# Patient Record
Sex: FEMALE | Race: WHITE
[De-identification: names, ages, dates, MRNs, and addresses within clinical notes are randomized per-mention and may not be internally consistent; named-entity substitution may affect disease eponyms.]

---

## 2017-04-08 NOTE — PHYS DOC
Past Medical History


Past Medical History:  Arthritis, Migraines, Other


Additional Past Medical Histor:  RA


Past Surgical History:  Cholecystectomy


Alcohol Use:  Rarely


Drug Use:  None





Adult General


Chief Complaint


Chief Complaint:  HEADACHE





HPI


HPI


Patient is a 37  year old female who presents with migraine headache.  The 

patient reports history of poorly controlled migraines.  Most recent onset of 

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  She reports 

headache is generalized, aching/throbbing, similar to previous migraine.  She 

reports photophobia/phonophobia, visual changes/aura, nausea  She denies fevers

, neck stiffness, vomiting, extremity numbness/weakness.  She did not take home 

meds today because she was already at her workplace . Has seen numerous 

specialists but never achieved good control of her symptoms, reports most days 

of each month she suffers from a migraine.





Review of Systems


Review of Systems


Constitutional: Denies fever or chills 


Eyes: Reports visual changes


HENT: Denies nasal congestion or sore throat 


Respiratory: Denies cough or shortness of breath 


Cardiovascular:  Denies chest pain or edema


GI: Reports nausea.  Denies abdominal pain, vomiting


Musculoskeletal: Denies back pain or joint pain 


Integument: Denies rash or skin lesions 


Neurologic: Reports headache, denies focal weakness or sensory changes





Current Medications


Current Medications





 Current Medications








 Medications


  (Trade)  Dose


 Ordered  Sig/Lj  Start Time


 Stop Time Status Last Admin


Dose Admin


 


 Diphenhydramine


 HCl


  (Benadryl)  25 mg  1X  ONCE  4/8/17 16:15


 4/8/17 16:16 DC 4/8/17 16:18


25 MG


 


 Hydromorphone HCl


  (Dilaudid)  1 mg  PRN Q15MIN  PRN  4/8/17 17:30


 4/8/17 18:50 DC 4/8/17 18:03


1 MG


 


 Ketorolac


 Tromethamine


  (Toradol)  30 mg  1X  ONCE  4/8/17 16:15


 4/8/17 16:16 DC 4/8/17 16:19


30 MG


 


 Methylprednisolone


 Sodium Succinate


  (Solu-Medrol


 125mg Vial)  125 mg  1X  ONCE  4/8/17 16:15


 4/8/17 16:16 DC 4/8/17 16:29


125 MG


 


 Prochlorperazine


 Edisylate


  (Compazine)  10 mg  1X  ONCE  4/8/17 16:15


 4/8/17 16:16 DC 4/8/17 16:18


10 MG


 


 Sodium Chloride


  (Iv Sodium


 Chloride 0.9%


 1000ml Bag)  1,000 ml @ 


 1,000 mls/hr  1X  ONCE  4/8/17 16:15


 4/8/17 17:14 DC 4/8/17 16:18


1,000 MLS/HR











Allergies


Allergies





 Allergies








Coded Allergies Type Severity Reaction Last Updated Verified


 


  benztropine Allergy Intermediate SEVERE PAIN 7/10/16 Yes


 


  dexamethasone Allergy Intermediate SEVERE PAIN 7/10/16 Yes











Physical Exam


Physical Exam


Constitutional: Well developed, well nourished, lying supine in dark room with 

hands covering her face. 


HENT: Normocephalic, atraumatic, bilateral external ears normal, oropharynx 

moist, no tonsillar enlargement or exudate ,nose normal.


Eyes: PERRLA, EOMI, conjunctiva normal, no discharge.


Neck: supple, no stridor.  no meningismus


Cardiovascular:  RRR, no murmurs, no edema. 


Lungs & Thorax:  LCTAB, no wheezing, no respiratory distress.


Abdomen: soft, nontender, nondistended.


Skin: Warm, dry, no erythema, no rash.


Back: No tenderness.


Extremities: No tenderness, no edema. 


Neurologic: Alert and oriented X 3, CN2-12 grossly intact, symmetric strength/

sensation to UE & LE, no focal deficits noted. 


Psychologic: Affect normal, judgement normal, mood normal.





Current Patient Data


Vital Signs





 Vital Signs








  Date Time  Temp Pulse Resp B/P Pulse Ox O2 Delivery O2 Flow Rate FiO2


 


4/8/17 18:19  76 16 153/75 95 Room Air  


 


4/8/17 15:31 97.5       





 97.5       








Lab Values





 Laboratory Tests








Test


  4/8/17


16:56


 


Urine Collection Type Unknown  


 


Urine Color Yellow  


 


Urine Clarity Clear  


 


Urine pH 8.0  


 


Urine Specific Gravity <=1.005  


 


Urine Protein


  Negativemg/dL


(NEG-TRACE)


 


Urine Glucose (UA)


  Negativemg/dL


(NEG)


 


Urine Ketones (Stick)


  Negativemg/dL


(NEG)


 


Urine Blood


  Negative (NEG)


 


 


Urine Nitrite


  Negative (NEG)


 


 


Urine Bilirubin


  Negative (NEG)


 


 


Urine Urobilinogen Dipstick


  0.2mg/dL (0.2


mg/dL)


 


Urine Leukocyte Esterase


  Negative (NEG)


 


 


Urine RBC 0/HPF (0-2)  


 


Urine WBC Occ/HPF (0-4)  


 


Urine Squamous Epithelial


Cells Few/LPF  


 


 


Urine Bacteria 0/HPF (0-FEW)  


 


Urine Pregnancy Test


  Negative (NEG)


 











EKG


EKG


[]





Radiology/Procedures


Radiology/Procedures


[]





Course & Med Decision Making


Course & Med Decision Making


Pertinent Labs and Imaging studies reviewed. (See chart for details)


Patient presents with usual migraine headache.  Gave IV fluids, toradol, 

compazine, benadryl.  She requested solumedrol as steroids have enhanced 

symptom relief in the past.  No significant relief with initial treatment, also 

gave dilaudid.  Her pain improved & she was comfortable with discharge home.  

Recommended rest, PO hydration, continue home meds, follow up with PCP or 

neurologist in 2-3 days if symptoms continue.  Come back for focal neuro deficit

, sudden onset or severe headache, mental status changes, any otherwise 

worsening condition.  Discharged home in stable & improved condition.


[]





Dragon Disclaimer


Dragon Disclaimer


This electronic medical record was generated, in whole or in part, using a 

voice recognition dictation system.





Departure


Departure


Impression:  


 Primary Impression:  


 Migraine


Disposition:  01 HOME, SELF-CARE


Condition:  STABLE


Referrals:  


BENNIE PASCUAL MD (PCP)





Additional Instructions:


You were seen in the emergency department today for migraine headache. Symptoms 

improved with treatment here. Please continue to rest, use home medications as 

prescribed. Follow-up with primary care physician or neurology within 2-3 days 

if not improving. Return to the emergency department for worst headache of your 

life, sudden onset of severe headache, severe confusion, difficulty walking or 

talking, trouble moving arms or legs, any otherwise worsening condition.








SONNY FUNG MD Apr 8, 2017 18:07

## 2017-09-02 NOTE — PHYS DOC
Past Medical History


Past Medical History:  Arthritis, Migraines, Other


Additional Past Medical Histor:  RA


Past Surgical History:  Cholecystectomy


Alcohol Use:  Rarely


Drug Use:  None





Adult General


Chief Complaint


Chief Complaint:  HEADACHE





HPI


HPI





Patient is a 37  year old female who presents with migraine headache.  The 

patient reports headache since this morning which is frontal, throbbing, 

typical of usual migraine headache.  Not sudden in onset, not the worst 

headache of her life.  Associated with nausea, photophobia, phonophobia.  

Denies fevers/chills, neck stiffness, vomiting, extremity numbness/weakness.  

She used DHE without relief.  Recently got an ear piercing that has provided 

some relief.  She has a PCP.





Review of Systems


Review of Systems


Constitutional: Denies fever or chills 


Eyes: Denies change in visual acuity


HENT: Denies nasal congestion or sore throat 


Respiratory: Denies cough or shortness of breath 


Cardiovascular:  Denies chest pain 


GI: Reports nausea.  Denies abdominal pain, vomiting


: Denies dysuria or hematuria 


Musculoskeletal: Denies back pain or joint pain 


Integument: Denies rash or skin lesions 


Neurologic: Reports headache, denies focal weakness or sensory changes





Current Medications


Current Medications





Current Medications








 Medications


  (Trade)  Dose


 Ordered  Sig/Lj  Start Time


 Stop Time Status Last Admin


Dose Admin


 


 Diphenhydramine


 HCl


  (Benadryl)  25 mg  1X  ONCE  9/2/17 11:45


 9/2/17 11:47 DC 9/2/17 12:25


25 MG


 


 Fentanyl Citrate


  (Fentanyl 2ml


 Vial)  50 mcg  1X  ONCE  9/2/17 14:00


 9/2/17 14:01 DC  


 


 


 Ketorolac


 Tromethamine


  (Toradol)  30 mg  1X  ONCE  9/2/17 11:45


 9/2/17 11:47 DC 9/2/17 12:25


30 MG


 


 Ondansetron HCl


  (Zofran)  4 mg  1X  ONCE  9/2/17 14:00


 9/2/17 14:01 DC  


 


 


 Prochlorperazine


 Edisylate


  (Compazine)  10 mg  1X  ONCE  9/2/17 11:45


 9/2/17 11:47 DC 9/2/17 12:25


10 MG











Allergies


Allergies





Allergies








Coded Allergies Type Severity Reaction Last Updated Verified


 


  benztropine Allergy Intermediate SEVERE PAIN 7/10/16 Yes


 


  dexamethasone Allergy Intermediate SEVERE PAIN 7/10/16 Yes











Physical Exam


Physical Exam


Constitutional: Well developed, well nourished, no acute distress, non-toxic 

appearance. 


HENT: Normocephalic, atraumatic, bilateral external ears normal, oropharynx 

moist, nose normal.


Eyes: PERRLA, EOMI, conjunctiva normal, no discharge.


Neck: supple, no stridor.  no meningismus.


Cardiovascular:  RRR, no murmurs, no edema. 


Lungs & Thorax:  LCTAB, no wheezing, no respiratory distress.


Abdomen: soft, nontender, nondistended.


Skin: Warm, dry, no erythema, no rash.


Back: No tenderness.


Extremities: No tenderness, no edema. 


Neurologic: Alert and oriented X 3, CN2-12 grossly intact, symmetric strength/

sensation to upper & lower extremities.  no focal deficits noted. 


Psychologic: Affect normal, judgement normal, mood normal.





Current Patient Data


Vital Signs





 Vital Signs








  Date Time  Temp Pulse Resp B/P (MAP) Pulse Ox O2 Delivery O2 Flow Rate FiO2


 


9/2/17 14:24  88 18 141/68 (92) 94 Room Air  


 


9/2/17 11:20 98.0       





 98.0       











EKG


EKG


[]





Radiology/Procedures


Radiology/Procedures


[]





Course & Med Decision Making


Course & Med Decision Making


Pertinent Labs and Imaging studies reviewed. (See chart for details)


The patient presents with usual migraine headache.  Gave compazine, benadryl, 

toradol, fluids.  Her symptoms improved.  Ordered UA & UCG but she did not 

provide urine.  She felt better & requested discharge home.  Recommend rest, PO 

hydration, follow up with PCP in 2-3 days for ongoing migraine management.  

Come back for sudden onset severe headache, worst headache of her life, focal 

neuro deficit, any otherwise worsening condition.  Discharged home in stable & 

improved condition.


[]





Dragon Disclaimer


Dragon Disclaimer


This electronic medical record was generated, in whole or in part, using a 

voice recognition dictation system.





Departure


Departure


Impression:  


 Primary Impression:  


 Migraine headache


Disposition:  01 HOME, SELF-CARE


Condition:  IMPROVED


Referrals:  


BENNIE PASCUAL MD (PCP)


Patient Instructions:  Migraine Headache, Easy-to-Read





Additional Instructions:  


You were seen in the emergency department today for migraine headache. Symptoms 

improved after treatment here. Please follow-up with primary care physician for 

ongoing management. Return to the emergency department for worst headache of 

your life, sudden onset severe headache, uncontrolled vomiting, difficulty 

moving arms or legs, any otherwise worsening condition.











SONNY FUNG MD Sep 2, 2017 14:27

## 2017-09-13 NOTE — RAD
CT scan of the head without contrast 9/13/2017

 

Clinical History: Severe intractable headaches.

 

Technique: Unenhanced, contiguous, 5 mm axial sections were obtained 

through the head.

 

One or more of the following individualized dose reduction techniques were

utilized for this study:

 

1. Automated exposure control.

2. Adjustment of the mA and/or kV according to patient size.

3. Use of iterative reconstruction technique.

 

 

 

Findings: The ventricles and sulci are within normal limits in size and 

configuration. No focal area of abnormal attenuation is seen involving the

brain parenchyma. No extra-axial fluid collection is seen. No skull 

fracture is seen.

 

Impression: Negative study.

 

Electronically signed by: Austin Van MD (9/13/2017 9:32 PM) [id number]

## 2017-09-13 NOTE — PHYS DOC
Past Medical History


Past Medical History:  Arthritis, Migraines, Other


Additional Past Medical Histor:  RA


Past Surgical History:  Cholecystectomy


Alcohol Use:  Rarely


Drug Use:  None





Adult General


Chief Complaint


Chief Complaint:  HEADACHE





HPI


HPI





Patient is a 37  year old  female who presents with migraine 

headache.She states it is a cluster headaches been going on for approximately 2 

weeks total. She states occasionally over the last 2 weeks over-the-counter 

medicines controlled her headaches, other times she has to get a prescription 

strength medicine. She states is a throbbing sensation she feels nauseated with 

it, it's on the right side of her head behind her right eye in the right side 

of her jaw. She states she's tried DHE for yesterday and the day before in 

addition she tried Toradol yesterday.. She states that she's had a migraine 

history for years and occasional she needs Solu-Medrol and magnesium. She 

states she was just here a few weeks ago with similar situation. She states she'

s tried Toradol at home eyes any fevers, neck stiffness. She does state sound 

and light makes her headache feel worse.





Review of Systems


Review of Systems





Constitutional: Denies fever or chills []


Eyes: Denies change in visual acuity, redness, or eye pain []


HENT: Denies nasal congestion or sore throat []


Respiratory: Denies cough or shortness of breath []


Cardiovascular: No additional information not addressed in HPI []


GI: Denies abdominal pain, nausea, vomiting, bloody stools or diarrhea []


: Denies dysuria or hematuria []


Musculoskeletal: Denies back pain or joint pain []


Integument: Denies rash or skin lesions []


Neurologic: Positive for headache,  Denies focal weakness or sensory changes []


Endocrine: Denies polyuria or polydipsia []





Current Medications


Current Medications





Current Medications








 Medications


  (Trade)  Dose


 Ordered  Sig/Lj  Start Time


 Stop Time Status Last Admin


Dose Admin


 


 Diphenhydramine


 HCl


  (Benadryl)  25 mg  1X  ONCE  9/13/17 20:00


 9/13/17 20:01 DC 9/13/17 20:02


25 MG


 


 Hydromorphone HCl


  (Dilaudid)  1 mg  PRN Q15MIN  PRN  9/13/17 18:45


 9/14/17 18:44  9/13/17 20:03


1 MG


 


 Ketorolac


 Tromethamine


  (Toradol)  30 mg  1X  ONCE  9/13/17 18:45


 9/13/17 18:47 DC 9/13/17 18:56


30 MG


 


 Lidocaine HCl


  (Xylocaine-Mpf


 1% Vial)  5 ml  1X  ONCE  9/13/17 19:45


 9/13/17 19:46 DC 9/13/17 19:53


5 ML


 


 Magnesium Sulfate/


 Dextrose  100 ml @ 


 100 mls/hr  1X  ONCE  9/13/17 18:45


 9/13/17 19:44 DC 9/13/17 18:57


100 MLS/HR


 


 Methylprednisolone


 Sodium Succinate


  (SOLU-Medrol


 125MG VIAL)  125 mg  1X  ONCE  9/13/17 17:30


 9/13/17 17:31 DC 9/13/17 17:45


125 MG


 


 Promethazine HCl


 12.5 mg/Sodium


 Chloride  50.5 ml @ 


 151.5 mls/


 hr  PRN Q6HRS  PRN  9/13/17 17:30


    9/13/17 17:50


151.5 MLS/HR


 


 Sodium Chloride  1,000 ml @ 


 1,000 mls/hr  1X  ONCE  9/13/17 17:30


 9/13/17 18:29 DC 9/13/17 17:49


1,000 MLS/HR











Allergies


Allergies





Allergies








Coded Allergies Type Severity Reaction Last Updated Verified


 


  benztropine Allergy Intermediate SEVERE PAIN 7/10/16 Yes


 


  dexamethasone Allergy Intermediate SEVERE PAIN 7/10/16 Yes











Physical Exam


Physical Exam





Constitutional: Well developed, well nourished, no acute distress, non-toxic 

appearance. []


HENT: Normocephalic, atraumatic, bilateral external ears normal, oropharynx 

moist, no oral exudates, nose normal. []


Eyes: PERRLA, EOMI, conjunctiva normal, no discharge. [] 


Neck: Normal range of motion, no tenderness, supple, no stridor. [] 


Cardiovascular:Heart rate regular rhythm, no murmur []


Lungs & Thorax:  Bilateral breath sounds clear to auscultation []


Abdomen: Bowel sounds normal, soft, no tenderness, no masses, no pulsatile 

masses. [] 


Skin: Warm, dry, no erythema, no rash. [] 


Back: No tenderness, no CVA tenderness. [] 


Extremities: No tenderness, no cyanosis, no clubbing, ROM intact, no edema. [] 


Neurologic: Alert and oriented X 3, normal motor function, normal sensory 

function, no focal deficits noted. []


Psychologic: Affect normal, judgement normal, mood normal. []





Current Patient Data


Vital Signs





 Vital Signs








  Date Time  Temp Pulse Resp B/P (MAP) Pulse Ox O2 Delivery O2 Flow Rate FiO2


 


9/13/17 20:03   18  99 Nasal Cannula  


 


9/13/17 18:37  90  164/79 (107)    


 


9/13/17 17:00 98.0       





 98.0       








Lab Values





 Laboratory Tests








Test


  9/13/17


17:30 9/13/17


18:20 9/13/17


18:27


 


White Blood Count


  6.7 x10^3/uL


(4.0-11.0) 


  


 


 


Red Blood Count


  4.51 x10^6/uL


(3.50-5.40) 


  


 


 


Hemoglobin


  13.3 g/dL


(12.0-15.5) 


  


 


 


Hematocrit


  38.8 %


(36.0-47.0) 


  


 


 


Mean Corpuscular Volume


  86 fL ()


  


  


 


 


Mean Corpuscular Hemoglobin 29 pg (25-35)    


 


Mean Corpuscular Hemoglobin


Concent 34 g/dL


(31-37) 


  


 


 


Red Cell Distribution Width


  12.9 %


(11.5-14.5) 


  


 


 


Platelet Count


  243 x10^3/uL


(140-400) 


  


 


 


Neutrophils (%) (Auto) 56 % (31-73)    


 


Lymphocytes (%) (Auto) 33 % (24-48)    


 


Monocytes (%) (Auto) 8 % (0-9)    


 


Eosinophils (%) (Auto) 3 % (0-3)    


 


Basophils (%) (Auto) 1 % (0-3)    


 


Neutrophils # (Auto)


  3.7 x10^3uL


(1.8-7.7) 


  


 


 


Lymphocytes # (Auto)


  2.2 x10^3/uL


(1.0-4.8) 


  


 


 


Monocytes # (Auto)


  0.5 x10^3/uL


(0.0-1.1) 


  


 


 


Eosinophils # (Auto)


  0.2 x10^3/uL


(0.0-0.7) 


  


 


 


Basophils # (Auto)


  0.0 x10^3/uL


(0.0-0.2) 


  


 


 


Urine Collection Type  Unknown   


 


Urine Color  Yellow   


 


Urine Clarity  Clear   


 


Urine pH  7.5   


 


Urine Specific Gravity  1.010   


 


Urine Protein


  


  Negative mg/dL


(NEG-TRACE) 


 


 


Urine Glucose (UA)


  


  Negative mg/dL


(NEG) 


 


 


Urine Ketones (Stick)


  


  15 mg/dL (NEG)


  


 


 


Urine Blood


  


  Negative (NEG)


  


 


 


Urine Nitrite


  


  Negative (NEG)


  


 


 


Urine Bilirubin


  


  Negative (NEG)


  


 


 


Urine Urobilinogen Dipstick


  


  0.2 mg/dL (0.2


mg/dL) 


 


 


Urine Leukocyte Esterase  Small (NEG)   


 


Urine RBC  0 /HPF (0-2)   


 


Urine WBC


  


  1-4 /HPF (0-4)


  


 


 


Urine Squamous Epithelial


Cells 


  Mod /LPF  


  


 


 


Urine Bacteria


  


  Moderate /HPF


(0-FEW) 


 


 


POC Urine HCG, Qualitative


  


  


  Hcg negative


(Negative)





 Laboratory Tests


9/13/17 17:30











EKG


EKG


[]





Radiology/Procedures


Radiology/Procedures


[]


Impressions:


Migraine headache





Course & Med Decision Making


Course & Med Decision Making


Pertinent Labs and Imaging studies reviewed. (See chart for details)





1844 patient states the headache is no better with her treatment of Phenergan, 

Benadryl, Solu-Medrol. She states symptoms magnesium works. She did try Toradol 

but was earlier in the week. So we will start 1 g mag, 30  mg IV Toradol and 1 

mg IV Dilaudid.





We attempted 2% lidocaine and use a nebulizer and she states the pain went from 

a 10 down to an 8 but she still doesn't feel well enough to be discharged. We 

will admit and consult neurology. I also tried nonrebreather 15 L/m for 20 

minutes to see if this would help. I spoke with Dr. Almendarez wanted CT noncontrast 

of the brain performed now and MRI with and without contrast in the morning. 

These images have been ordered and are pending at this time. Patient's being 

admitted to the hospitalist who is going to follow-up on the CT scan.





Dragon Disclaimer


Dragon Disclaimer


This electronic medical record was generated, in whole or in part, using a 

voice recognition dictation system.





Departure


Departure


Impression:  


 Primary Impression:  


 Migraine


Disposition:  09 ADMITTED AS INPATIENT


Admitting Physician:  Other


Condition:  STABLE


Referrals:  


BENNIE PASCUAL MD (PCP)





Problem Qualifiers








 Primary Impression:  


 Migraine


 Migraine type:  unspecified  Status migrainosus presence:  with status 

migrainosus  Intractability:  intractable  Qualified Codes:  G43.911 - Migraine

, unspecified, intractable, with status migrainosus








YADIRA DYE MD Sep 13, 2017 17:24

## 2017-09-14 NOTE — RAD
INDICATION: Headaches. Concern for pseudotumor cerebri or Chiari 

malformation.

 

TECHNIQUE: Sagittal T1, axial T1, axial T2, axial FLAIR, axial T2 

gradient, coronal postcontrast, axial postcontrast, and diffusion imaging 

with ADC map was performed. 7.5 mL of intravenous Gadavist was 

administered without complication. Comparison MR angiogram from earlier 

today was reviewed. There is also a CT from one day earlier.

 

FINDINGS: The ventricles are normal in size and configuration. The 

ventricles do not appear prominent in relation to the sulci. There is no 

acute intracranial hemorrhage or extra-axial fluid collection. There is no

mass effect or midline shift. There is no restricted diffusion to suggest 

an acute infarct. Sagittal midline structures are unremarkable. There is 

no cerebellar tonsillar ectopia. Pituitary and suprasellar region are 

unremarkable. Intracranial flow voids are preserved. There is minimal 

ethmoid and maxillary mucosal thickening. Orbital contents are 

unremarkable. There is no fluid in the optic sheaths.

 

There is no pathologic enhancement.

 

IMPRESSION:

No acute intracranial findings.

 

Electronically signed by: Saúl Mauricio MD (9/14/2017 2:58 PM) 

[facility]

## 2017-09-14 NOTE — RAD
Indication: Chronic migraine headaches.

 

Technique: Study is dated September 14, 2017. 3-D time of flight MRA of 

the [place] of [place] was performed without contrast. Source and MIP 

reconstructed images were reviewed.   Comparison CT head is from 1 day 

earlier.

 

Findings:  The visualized internal carotid arteries are normal in caliber 

without focal stenosis.  The bilateral anterior and middle cerebral 

arteries are normal in caliber without focal stenosis or aneurysm.  The 

vertebral, basilar, and posterior cerebral arteries are normal in caliber 

without focal stenosis or aneurysm.

 

Impression: Normal intracranial MRA.

 

Electronically signed by: Saúl Mauricio MD (9/14/2017 11:38 AM) 

[facility]

## 2017-09-14 NOTE — PDOC2
NEUROLOGY CONSULT


Date of Admission


Date of Admission


DATE: 9/14/17 


TIME: 15:38





Reason for Consult


Reason for Consult:


Intractable migraine headache.


Chronic migraine headache.


Pseudotumor cerebri ?.


HTN


OA


Over weight.


No evidence of Chiari malformation on MRI.





RECOMMENDATIONS/PLAN:


Pain control.


Metoprolol 25 mg daily.


Lab: see orders.


LP to measure CSF opening pressure.


Discussed with patient in all detail at bedside. 





HISTORY OF THE PRESENT ILLNESS:


37-y-old  female patient with long standing history of chronic 

migraine headache since teenage age. She used to have frequent headaches about 

15 days or more each month. Her headaches are at frontal head, around orbital 

area spreading to top, side and back of head as sharp pain. She rated her 

headaches 8-10/10. She was treated with multiple medications including Triptan, 

Topamax, Neurontin, Botox, DHE, etc, but stated not help. She has HTN and has 

been on a new calcium channel blocker recently but seemed increased her 

headaches. No symptoms of mental status changes, projectile vomiting, blurred 

vision, diplopia, ataxia, aphasia, numbness or weakness.





PAST MEDICAL HISTORY:


Please see above.





PAST SURGERY HISTORY: 


No major surgery recently.





ALLERGY:


Benztropine


Dexamethason





MEDICATIONS:


Refer to MAR





FAMILY HISTORY: 


Mother has migraine headache.


Father has aura w/o headache.





SOCIAL HISTORY: 


Lives with her  at home.


Denies smoking, drinking, and illicit drug use.  





REVIEW OF SYSTEMS:


Constitutional: No malnutrition, weight loss, cachexia.


Head: No traumatic brain or head injury.


Skin: No edema, or rash.


Ear: No infection, tinnitus.


Eyes: No vision loss or color blindness.


Nose: No bleeding or purulent discharges.


Hearing: No hearing decrease.


Neck: No injury.


Breast: No history of cancer, masses,or discharges.


Cardiac:HTN.


Pulmonary: No OPD.


GI: No GI ulcer, GI bleeding.


Urinary/genital: UTI.


Endocrinologic: Over weight.


Skeletomuscular: No muscular atrophy, deformity.


Neurological: see  HP.


Psychiatric: Denies drug use/abuse.


Otherwise, not pertinant14-point review of systems.





PHYSICAL EXAMINATION:   


General appearance is in acute distress.  


HEENT:  Normocephalic and nontraumatic.  Eyes, nose, ears, and throat are 

unremarkable.  


Neck is supple. No lymphadenopathy. No crepitus. 


Cardiovascular:  S1, S2, regular rate and rhythm.  


Pulmonary:  Clear to auscultation bilaterally. 


Abdomen:  Bowel sounds are positive.     


Extremities:  No rash, lesions, or edema.  


No restriction of range of motion





NEUROLOGICAL  EXAMINATION:


Alert 


Oriented to time, place and person.


Pupils equal round. Not be able to tolerate light stimuli.


EOMI.


CN: no focal findings.


Muscle tone: within normal.


Muscle strength: 5


DTR: 2


Plantar reflex: Flexor response bilaterally 


Gait: not examined in bed. 


Sensory exam: no abnormal findings.





Current Medications


Current Medications





Current Medications


Diphenhydramine HCl (Benadryl) 50 mg 1X  ONCE IVP  Last administered on 9/13/ 17at 17:45;  Start 9/13/17 at 17:30;  Stop 9/13/17 at 17:31;  Status DC


Promethazine HCl 12.5 mg/Sodium Chloride 50.5 ml @  151.5 mls/ hr PRN Q6HRS  

PRN IV NAUSEA/VOMITING Last administered on 9/13/17at 17:50;  Start 9/13/17 at 

17:30


Methylprednisolone Sodium Succinate (SOLU-Medrol 125MG VIAL) 125 mg 1X  ONCE IV

  Last administered on 9/13/17at 17:45;  Start 9/13/17 at 17:30;  Stop 9/13/17 

at 17:31;  Status DC


Sodium Chloride 1,000 ml @  1,000 mls/hr 1X  ONCE IV  Last administered on 9/13/ 17at 17:49;  Start 9/13/17 at 17:30;  Stop 9/13/17 at 18:29;  Status DC


Hydromorphone HCl (Dilaudid) 1 mg PRN Q15MIN  PRN IV/SQ PAIN GREATER THAN 3/10 

Last administered on 9/13/17at 20:03;  Start 9/13/17 at 18:45;  Stop 9/14/17 at 

00:23;  Status DC


Magnesium Sulfate/ Dextrose 100 ml @  100 mls/hr 1X  ONCE IV  Last administered 

on 9/13/17at 18:57;  Start 9/13/17 at 18:45;  Stop 9/13/17 at 19:44;  Status DC


Ketorolac Tromethamine (Toradol) 30 mg 1X  ONCE IV  Last administered on 9/13/ 17at 18:56;  Start 9/13/17 at 18:45;  Stop 9/13/17 at 18:47;  Status DC


Lidocaine HCl (Xylocaine-Mpf 1% Vial) 5 ml 1X  ONCE NEB  Last administered on 9/ 13/17at 19:53;  Start 9/13/17 at 19:45;  Stop 9/13/17 at 19:46;  Status DC


Diphenhydramine HCl (Benadryl) 25 mg 1X  ONCE IVP  Last administered on 9/13/ 17at 20:02;  Start 9/13/17 at 20:00;  Stop 9/13/17 at 20:01;  Status DC


Ondansetron HCl (Zofran) 4 mg PRN Q8HRS  PRN IV NAUSEA/VOMITING Last 

administered on 9/13/17at 21:11;  Start 9/13/17 at 21:00;  Stop 9/14/17 at 20:59


Dextrose/Sodium Chloride 1,000 ml @  125 mls/hr 1X  ONCE IV  Last administered 

on 9/13/17at 21:15;  Start 9/13/17 at 21:15;  Stop 9/14/17 at 05:14;  Status DC


Hydromorphone HCl (Dilaudid) 3 mg 1X  ONCE IV  Last administered on 9/14/17at 00

:49;  Start 9/14/17 at 00:45;  Stop 9/14/17 at 00:46;  Status DC


Clonazepam (KlonoPIN) 1 mg PRN TID  PRN PO ANXIETY Last administered on 9/14/ 17at 09:18;  Start 9/14/17 at 00:30


Diphenhydramine HCl (Benadryl) 50 mg PRN Q6HRS  PRN PO HEADACHE Last 

administered on 9/14/17at 01:13;  Start 9/14/17 at 00:30


Acetaminophen/ Hydrocodone Bitart (Lortab 5/325) 1 tab PRN Q4HRS  PRN PO 

MODERATE PAIN Last administered on 9/14/17at 02:25;  Start 9/14/17 at 00:30


Nifedipine (Procardia Xl) 30 mg HS PO ;  Start 9/14/17 at 21:00


Tizanidine HCl (Zanaflex) 4 mg PRN QID  PRN PO MUSCLE SPASMS Last administered 

on 9/14/17at 00:53;  Start 9/14/17 at 00:30


Non-Formulary Medication 1 ml PRN BID  PRN NS MIGRAINE HEADACHE;  Start 9/14/17 

at 00:30;  Status UNV


Dronabinol (Marinol) 5 mg PRN TID  PRN PO NAUSEA Last administered on 9/14/17at 

09:18;  Start 9/14/17 at 11:30


Non-Formulary Medication 1 tab DAILY PO ;  Start 9/14/17 at 09:00;  Stop 9/14/ 17 at 12:50;  Status DC


Ondansetron HCl (Zofran Odt) 8 mg PRN Q8HRS  PRN PO NAUSEA/VOMITING;  Start 9/14 /17 at 00:30


Quetiapine Fumarate (SEROquel XR) 300 mg QHS PO ;  Start 9/14/17 at 21:00


Potassium Chloride 30 meq/ Sodium Chloride 1,015 ml @  75 mls/hr [id number] IV  

Last administered on 9/14/17at 01:14;  Start 9/14/17 at 01:00


Gadobutrol (Gadavist) 7.5 mmol 1X  ONCE IV  Last administered on 9/14/17at 14:20

;  Start 9/14/17 at 14:00;  Stop 9/14/17 at 14:01;  Status DC





Active Scripts


Active


Reported


Dihydroergotamine Mesylate 1 Ml Spray.pump 1 Ml NS PRN BID PRN


Hydrocodone-Apap 5-325  ** (Hydrocodone Bit/Acetaminophen) 1 Each Tablet 1 Tab 

PO PRN Q4HRS PRN


Loestrin (Norethindrone A-E Estradiol) 1 Each Tablet 1 Tab PO DAILY


Clonazepam 1 Mg Tablet 1 Mg PO PRN TID PRN


Tizanidine Hcl 4 Mg Tablet 4 Mg PO QID PRN


Seroquel (Quetiapine Fumarate) 300 Mg Tablet 300 Mg PO HS


Marinol (Dronabinol) 5 Mg Capsule 5 Mg PO PRN TID PRN


Nifedipine Er (Nifedipine) 30 Mg Tab.er.24 1 Tab PO HS


Benadryl (Diphenhydramine Hcl) 25 Mg Capsule 2 Cap PO PRN Q6-8HRS PRN


Zofran (Ondansetron Hcl) 8 Mg Tablet 8 Mg PO BID PRN





Allergies


Allergies:  


Coded Allergies:  


     benztropine (Verified  Allergy, Intermediate, SEVERE PAIN, 7/10/16)


     dexamethasone (Verified  Allergy, Intermediate, SEVERE PAIN, 7/10/16)





Vitals


VITALS





Vital Signs








  Date Time  Temp Pulse Resp B/P (MAP) Pulse Ox O2 Delivery O2 Flow Rate FiO2


 


9/14/17 07:40      Room Air  


 


9/14/17 07:00 98.4 74 18 100/53 (69) 90   





 98.4       


 


9/13/17 21:00       15.0 











Labs


Labs





Laboratory Tests








Test


  9/13/17


17:30 9/13/17


18:20 9/13/17


18:27


 


White Blood Count


  6.7 x10^3/uL


(4.0-11.0) 


  


 


 


Red Blood Count


  4.51 x10^6/uL


(3.50-5.40) 


  


 


 


Hemoglobin


  13.3 g/dL


(12.0-15.5) 


  


 


 


Hematocrit


  38.8 %


(36.0-47.0) 


  


 


 


Mean Corpuscular Volume 86 fL ()   


 


Mean Corpuscular Hemoglobin 29 pg (25-35)   


 


Mean Corpuscular Hemoglobin


Concent 34 g/dL


(31-37) 


  


 


 


Red Cell Distribution Width


  12.9 %


(11.5-14.5) 


  


 


 


Platelet Count


  243 x10^3/uL


(140-400) 


  


 


 


Neutrophils (%) (Auto) 56 % (31-73)   


 


Lymphocytes (%) (Auto) 33 % (24-48)   


 


Monocytes (%) (Auto) 8 % (0-9)   


 


Eosinophils (%) (Auto) 3 % (0-3)   


 


Basophils (%) (Auto) 1 % (0-3)   


 


Neutrophils # (Auto)


  3.7 x10^3uL


(1.8-7.7) 


  


 


 


Lymphocytes # (Auto)


  2.2 x10^3/uL


(1.0-4.8) 


  


 


 


Monocytes # (Auto)


  0.5 x10^3/uL


(0.0-1.1) 


  


 


 


Eosinophils # (Auto)


  0.2 x10^3/uL


(0.0-0.7) 


  


 


 


Basophils # (Auto)


  0.0 x10^3/uL


(0.0-0.2) 


  


 


 


Erythrocyte Sedimentation Rate 13 (0-25)   


 


Sodium Level


  138 mmol/L


(136-145) 


  


 


 


Potassium Level


  3.3 mmol/L


(3.5-5.1) 


  


 


 


Chloride Level


  100 mmol/L


() 


  


 


 


Carbon Dioxide Level


  26 mmol/L


(21-32) 


  


 


 


Anion Gap 12 (6-14)   


 


Blood Urea Nitrogen


  13 mg/dL


(7-20) 


  


 


 


Creatinine


  0.7 mg/dL


(0.6-1.0) 


  


 


 


Estimated GFR


(Cockcroft-Gault) 94.2 


  


  


 


 


Glucose Level


  89 mg/dL


(70-99) 


  


 


 


Calcium Level


  9.2 mg/dL


(8.5-10.1) 


  


 


 


Urine Collection Type  Unknown  


 


Urine Color  Yellow  


 


Urine Clarity  Clear  


 


Urine pH  7.5  


 


Urine Specific Gravity  1.010  


 


Urine Protein


  


  Negative mg/dL


(NEG-TRACE) 


 


 


Urine Glucose (UA)


  


  Negative mg/dL


(NEG) 


 


 


Urine Ketones (Stick)  15 mg/dL (NEG)  


 


Urine Blood  Negative (NEG)  


 


Urine Nitrite  Negative (NEG)  


 


Urine Bilirubin  Negative (NEG)  


 


Urine Urobilinogen Dipstick


  


  0.2 mg/dL (0.2


mg/dL) 


 


 


Urine Leukocyte Esterase  Small (NEG)  


 


Urine RBC  0 /HPF (0-2)  


 


Urine WBC  1-4 /HPF (0-4)  


 


Urine Squamous Epithelial


Cells 


  Mod /LPF 


  


 


 


Urine Bacteria


  


  Moderate /HPF


(0-FEW) 


 


 


Bedside Urine HCG, Qualitative


  


  


  Hcg negative


(Negative)








Laboratory Tests








Test


  9/13/17


17:30 9/13/17


18:20 9/13/17


18:27


 


White Blood Count


  6.7 x10^3/uL


(4.0-11.0) 


  


 


 


Red Blood Count


  4.51 x10^6/uL


(3.50-5.40) 


  


 


 


Hemoglobin


  13.3 g/dL


(12.0-15.5) 


  


 


 


Hematocrit


  38.8 %


(36.0-47.0) 


  


 


 


Mean Corpuscular Volume 86 fL ()   


 


Mean Corpuscular Hemoglobin 29 pg (25-35)   


 


Mean Corpuscular Hemoglobin


Concent 34 g/dL


(31-37) 


  


 


 


Red Cell Distribution Width


  12.9 %


(11.5-14.5) 


  


 


 


Platelet Count


  243 x10^3/uL


(140-400) 


  


 


 


Neutrophils (%) (Auto) 56 % (31-73)   


 


Lymphocytes (%) (Auto) 33 % (24-48)   


 


Monocytes (%) (Auto) 8 % (0-9)   


 


Eosinophils (%) (Auto) 3 % (0-3)   


 


Basophils (%) (Auto) 1 % (0-3)   


 


Neutrophils # (Auto)


  3.7 x10^3uL


(1.8-7.7) 


  


 


 


Lymphocytes # (Auto)


  2.2 x10^3/uL


(1.0-4.8) 


  


 


 


Monocytes # (Auto)


  0.5 x10^3/uL


(0.0-1.1) 


  


 


 


Eosinophils # (Auto)


  0.2 x10^3/uL


(0.0-0.7) 


  


 


 


Basophils # (Auto)


  0.0 x10^3/uL


(0.0-0.2) 


  


 


 


Erythrocyte Sedimentation Rate 13 (0-25)   


 


Sodium Level


  138 mmol/L


(136-145) 


  


 


 


Potassium Level


  3.3 mmol/L


(3.5-5.1) 


  


 


 


Chloride Level


  100 mmol/L


() 


  


 


 


Carbon Dioxide Level


  26 mmol/L


(21-32) 


  


 


 


Anion Gap 12 (6-14)   


 


Blood Urea Nitrogen


  13 mg/dL


(7-20) 


  


 


 


Creatinine


  0.7 mg/dL


(0.6-1.0) 


  


 


 


Estimated GFR


(Cockcroft-Gault) 94.2 


  


  


 


 


Glucose Level


  89 mg/dL


(70-99) 


  


 


 


Calcium Level


  9.2 mg/dL


(8.5-10.1) 


  


 


 


Urine Collection Type  Unknown  


 


Urine Color  Yellow  


 


Urine Clarity  Clear  


 


Urine pH  7.5  


 


Urine Specific Gravity  1.010  


 


Urine Protein


  


  Negative mg/dL


(NEG-TRACE) 


 


 


Urine Glucose (UA)


  


  Negative mg/dL


(NEG) 


 


 


Urine Ketones (Stick)  15 mg/dL (NEG)  


 


Urine Blood  Negative (NEG)  


 


Urine Nitrite  Negative (NEG)  


 


Urine Bilirubin  Negative (NEG)  


 


Urine Urobilinogen Dipstick


  


  0.2 mg/dL (0.2


mg/dL) 


 


 


Urine Leukocyte Esterase  Small (NEG)  


 


Urine RBC  0 /HPF (0-2)  


 


Urine WBC  1-4 /HPF (0-4)  


 


Urine Squamous Epithelial


Cells 


  Mod /LPF 


  


 


 


Urine Bacteria


  


  Moderate /HPF


(0-FEW) 


 


 


Bedside Urine HCG, Qualitative


  


  


  Hcg negative


(Negative)

















ENRIQUE HERNANDEZ MD Sep 14, 2017 15:44

## 2017-09-14 NOTE — HP
ADMIT DATE:  09/13/2017



CHIEF COMPLAINT:  Intractable headache.



HISTORY OF PRESENT ILLNESS:  The patient is a 37-year-old  woman with

long-standing history of severe migraines.  Her most recent bout started about 2

weeks ago.  She actually presented to the Emergency Room, got admitted there and

felt a litter better.  However, the migraine was not completely broken and she

limped along.  This morning, she woke up and had extreme severe headache with

pounding even before getting up, and therefore, decided to come to the Emergency

Room.



In the ER, she received consecutively a multiple medications which minimally

affected her headache.  She is now admitted for intractable headache.



PAST MEDICAL HISTORY:  Migraine headaches.



FAMILY HISTORY:  Both parents have history of migraines with aura.



SOCIAL HISTORY:  She is , living with her family.  Ultrasound tech here

at [facility].  No toxic habits.



ALLERGIES:  BENZTROPINE AND DEXAMETHASONE.



MEDICATIONS:  MAR reconciled with home medications.



REVIEW OF SYSTEMS:  Positive as per HPI.  Denies any ongoing aura, is sensitive

to sound and light.  Rest of organ system review shows some nausea and

dizziness, but no other symptoms in rest of organ system review.



PHYSICAL EXAMINATION:

VITAL SIGNS:  From today show a blood pressure of 136/82, heart rate of 95 and

respiratory rate at 18.  She is afebrile.

GENERAL:  This is a well-nourished 37-year-old  woman, alert and

oriented, in moderate distress due to headache.

HEENT:  Shows no scleral icterus.

NECK:  Supple.

LUNGS:  Fairly clear bilaterally.

HEART:  Regular rate and rhythm.

ABDOMEN:  Positive bowel sounds.  Soft, nontender.

EXTREMITIES:  Show no edema.

SKIN:  Warm, soft and dry.



LABORATORY DATA:  CBC with a WBC of 6.7, hemoglobin 13.3 and platelets of

243,000.  Chemistries with a BUN and creatinine of 13 and 0.7 and potassium of

3.3.



IMAGING:  CT noncontrast of the head shows ventricles and sulci within normal

limits in size and configuration.  No focal area of abnormal attenuation is seen

involving the brain parenchyma.



ASSESSMENT AND PLAN:  The patient is a 37-year-old  woman with

long-standing history of migraine headaches.  She has not picked a neurologist

to follow up with here since moving to the [facility].  This is her second

migraine this year.  I will get her admitted.  Dr. Almendarez from Neurology has been

consulted.  An MRA of the brain is pending.  In the meantime, we will try and

break her cycle with Dilaudid as this seemed to be the only medication that at

least temporarily helped a little bit.  I will try and keep her comfortable.  IV

fluids will be continued for the time being.  I will change to half normal

saline with potassium as her potassium in the ER was noted to be slightly low. 

We will follow up with labs in the morning.  We will also continue all her home

medications, which are extensive.

 



______________________________

SHELLEY RIDER MD



DR:  LUZMARIA/nts  JOB#:  [id number] / [id number]

DD:  09/14/2017 00:33  DT:  09/14/2017 01:06

TH[place]

## 2017-09-14 NOTE — PDOC
PROGRESS NOTES


Chief Complaint


Chief Complaint


Intractable headache





PMH: 


migraine headaches





History of Present Illness


History of Present Illness


Pt just returned from MRI, laying in bed with sleeping mask.  States she hasn't 

slept all night.





Angiography MRI of brain:  


IMPRESSION: Normal intracranial MRA.





Head CT: 


IMPRESSION: Negative study.





Vitals


Vitals





Vital Signs








  Date Time  Temp Pulse Resp B/P (MAP) Pulse Ox O2 Delivery O2 Flow Rate FiO2


 


9/14/17 07:40      Room Air  


 


9/14/17 07:00 98.4 74 18 100/53 (69) 90   





 98.4       


 


9/13/17 21:00       15.0 











Physical Exam


General:  Alert, Cooperative, No acute distress


Heart:  Regular rate, No murmurs


Lungs:  Clear, Other (no crackles, no wheezes)


Abdomen:  Soft, No tenderness


Extremities:  No clubbing, No cyanosis


Skin:  No rashes, No breakdown





Labs


LABS





Laboratory Tests








Test


  9/13/17


17:30 9/13/17


18:20 9/13/17


18:27


 


White Blood Count


  6.7 x10^3/uL


(4.0-11.0) 


  


 


 


Red Blood Count


  4.51 x10^6/uL


(3.50-5.40) 


  


 


 


Hemoglobin


  13.3 g/dL


(12.0-15.5) 


  


 


 


Hematocrit


  38.8 %


(36.0-47.0) 


  


 


 


Mean Corpuscular Volume 86 fL ()   


 


Mean Corpuscular Hemoglobin 29 pg (25-35)   


 


Mean Corpuscular Hemoglobin


Concent 34 g/dL


(31-37) 


  


 


 


Red Cell Distribution Width


  12.9 %


(11.5-14.5) 


  


 


 


Platelet Count


  243 x10^3/uL


(140-400) 


  


 


 


Neutrophils (%) (Auto) 56 % (31-73)   


 


Lymphocytes (%) (Auto) 33 % (24-48)   


 


Monocytes (%) (Auto) 8 % (0-9)   


 


Eosinophils (%) (Auto) 3 % (0-3)   


 


Basophils (%) (Auto) 1 % (0-3)   


 


Neutrophils # (Auto)


  3.7 x10^3uL


(1.8-7.7) 


  


 


 


Lymphocytes # (Auto)


  2.2 x10^3/uL


(1.0-4.8) 


  


 


 


Monocytes # (Auto)


  0.5 x10^3/uL


(0.0-1.1) 


  


 


 


Eosinophils # (Auto)


  0.2 x10^3/uL


(0.0-0.7) 


  


 


 


Basophils # (Auto)


  0.0 x10^3/uL


(0.0-0.2) 


  


 


 


Erythrocyte Sedimentation Rate 13 (0-25)   


 


Sodium Level


  138 mmol/L


(136-145) 


  


 


 


Potassium Level


  3.3 mmol/L


(3.5-5.1) 


  


 


 


Chloride Level


  100 mmol/L


() 


  


 


 


Carbon Dioxide Level


  26 mmol/L


(21-32) 


  


 


 


Anion Gap 12 (6-14)   


 


Blood Urea Nitrogen


  13 mg/dL


(7-20) 


  


 


 


Creatinine


  0.7 mg/dL


(0.6-1.0) 


  


 


 


Estimated GFR


(Cockcroft-Gault) 94.2 


  


  


 


 


Glucose Level


  89 mg/dL


(70-99) 


  


 


 


Calcium Level


  9.2 mg/dL


(8.5-10.1) 


  


 


 


Urine Collection Type  Unknown  


 


Urine Color  Yellow  


 


Urine Clarity  Clear  


 


Urine pH  7.5  


 


Urine Specific Gravity  1.010  


 


Urine Protein


  


  Negative mg/dL


(NEG-TRACE) 


 


 


Urine Glucose (UA)


  


  Negative mg/dL


(NEG) 


 


 


Urine Ketones (Stick)  15 mg/dL (NEG)  


 


Urine Blood  Negative (NEG)  


 


Urine Nitrite  Negative (NEG)  


 


Urine Bilirubin  Negative (NEG)  


 


Urine Urobilinogen Dipstick


  


  0.2 mg/dL (0.2


mg/dL) 


 


 


Urine Leukocyte Esterase  Small (NEG)  


 


Urine RBC  0 /HPF (0-2)  


 


Urine WBC  1-4 /HPF (0-4)  


 


Urine Squamous Epithelial


Cells 


  Mod /LPF 


  


 


 


Urine Bacteria


  


  Moderate /HPF


(0-FEW) 


 


 


Bedside Urine HCG, Qualitative


  


  


  Hcg negative


(Negative)











Review of Systems


Review of Systems


c/o fatigue


headache





Assessment and Plan


Assessmemt and Plan


Problems


Medical Problems:


(1) Migraine


Status: Acute  





Migraine





1. Consulted Neurology


2. CT of head and MRA of brain both negative


3. Continue pain medicine; Percocet 5 mg PO q 8 hr prn #5 written, in chart


4. Probable DC today, if ok with neuro


5. F/up with PCP 1 week





Problems:  





Comment


Review of Relevant


I have reviewed the following items amber (where applicable) has been applied.


Labs





Laboratory Tests








Test


  9/13/17


17:30 9/13/17


18:20 9/13/17


18:27


 


White Blood Count


  6.7 x10^3/uL


(4.0-11.0) 


  


 


 


Red Blood Count


  4.51 x10^6/uL


(3.50-5.40) 


  


 


 


Hemoglobin


  13.3 g/dL


(12.0-15.5) 


  


 


 


Hematocrit


  38.8 %


(36.0-47.0) 


  


 


 


Mean Corpuscular Volume 86 fL ()   


 


Mean Corpuscular Hemoglobin 29 pg (25-35)   


 


Mean Corpuscular Hemoglobin


Concent 34 g/dL


(31-37) 


  


 


 


Red Cell Distribution Width


  12.9 %


(11.5-14.5) 


  


 


 


Platelet Count


  243 x10^3/uL


(140-400) 


  


 


 


Neutrophils (%) (Auto) 56 % (31-73)   


 


Lymphocytes (%) (Auto) 33 % (24-48)   


 


Monocytes (%) (Auto) 8 % (0-9)   


 


Eosinophils (%) (Auto) 3 % (0-3)   


 


Basophils (%) (Auto) 1 % (0-3)   


 


Neutrophils # (Auto)


  3.7 x10^3uL


(1.8-7.7) 


  


 


 


Lymphocytes # (Auto)


  2.2 x10^3/uL


(1.0-4.8) 


  


 


 


Monocytes # (Auto)


  0.5 x10^3/uL


(0.0-1.1) 


  


 


 


Eosinophils # (Auto)


  0.2 x10^3/uL


(0.0-0.7) 


  


 


 


Basophils # (Auto)


  0.0 x10^3/uL


(0.0-0.2) 


  


 


 


Erythrocyte Sedimentation Rate 13 (0-25)   


 


Sodium Level


  138 mmol/L


(136-145) 


  


 


 


Potassium Level


  3.3 mmol/L


(3.5-5.1) 


  


 


 


Chloride Level


  100 mmol/L


() 


  


 


 


Carbon Dioxide Level


  26 mmol/L


(21-32) 


  


 


 


Anion Gap 12 (6-14)   


 


Blood Urea Nitrogen


  13 mg/dL


(7-20) 


  


 


 


Creatinine


  0.7 mg/dL


(0.6-1.0) 


  


 


 


Estimated GFR


(Cockcroft-Gault) 94.2 


  


  


 


 


Glucose Level


  89 mg/dL


(70-99) 


  


 


 


Calcium Level


  9.2 mg/dL


(8.5-10.1) 


  


 


 


Urine Collection Type  Unknown  


 


Urine Color  Yellow  


 


Urine Clarity  Clear  


 


Urine pH  7.5  


 


Urine Specific Gravity  1.010  


 


Urine Protein


  


  Negative mg/dL


(NEG-TRACE) 


 


 


Urine Glucose (UA)


  


  Negative mg/dL


(NEG) 


 


 


Urine Ketones (Stick)  15 mg/dL (NEG)  


 


Urine Blood  Negative (NEG)  


 


Urine Nitrite  Negative (NEG)  


 


Urine Bilirubin  Negative (NEG)  


 


Urine Urobilinogen Dipstick


  


  0.2 mg/dL (0.2


mg/dL) 


 


 


Urine Leukocyte Esterase  Small (NEG)  


 


Urine RBC  0 /HPF (0-2)  


 


Urine WBC  1-4 /HPF (0-4)  


 


Urine Squamous Epithelial


Cells 


  Mod /LPF 


  


 


 


Urine Bacteria


  


  Moderate /HPF


(0-FEW) 


 


 


Bedside Urine HCG, Qualitative


  


  


  Hcg negative


(Negative)








Laboratory Tests








Test


  9/13/17


17:30 9/13/17


18:20 9/13/17


18:27


 


White Blood Count


  6.7 x10^3/uL


(4.0-11.0) 


  


 


 


Red Blood Count


  4.51 x10^6/uL


(3.50-5.40) 


  


 


 


Hemoglobin


  13.3 g/dL


(12.0-15.5) 


  


 


 


Hematocrit


  38.8 %


(36.0-47.0) 


  


 


 


Mean Corpuscular Volume 86 fL ()   


 


Mean Corpuscular Hemoglobin 29 pg (25-35)   


 


Mean Corpuscular Hemoglobin


Concent 34 g/dL


(31-37) 


  


 


 


Red Cell Distribution Width


  12.9 %


(11.5-14.5) 


  


 


 


Platelet Count


  243 x10^3/uL


(140-400) 


  


 


 


Neutrophils (%) (Auto) 56 % (31-73)   


 


Lymphocytes (%) (Auto) 33 % (24-48)   


 


Monocytes (%) (Auto) 8 % (0-9)   


 


Eosinophils (%) (Auto) 3 % (0-3)   


 


Basophils (%) (Auto) 1 % (0-3)   


 


Neutrophils # (Auto)


  3.7 x10^3uL


(1.8-7.7) 


  


 


 


Lymphocytes # (Auto)


  2.2 x10^3/uL


(1.0-4.8) 


  


 


 


Monocytes # (Auto)


  0.5 x10^3/uL


(0.0-1.1) 


  


 


 


Eosinophils # (Auto)


  0.2 x10^3/uL


(0.0-0.7) 


  


 


 


Basophils # (Auto)


  0.0 x10^3/uL


(0.0-0.2) 


  


 


 


Erythrocyte Sedimentation Rate 13 (0-25)   


 


Sodium Level


  138 mmol/L


(136-145) 


  


 


 


Potassium Level


  3.3 mmol/L


(3.5-5.1) 


  


 


 


Chloride Level


  100 mmol/L


() 


  


 


 


Carbon Dioxide Level


  26 mmol/L


(21-32) 


  


 


 


Anion Gap 12 (6-14)   


 


Blood Urea Nitrogen


  13 mg/dL


(7-20) 


  


 


 


Creatinine


  0.7 mg/dL


(0.6-1.0) 


  


 


 


Estimated GFR


(Cockcroft-Gault) 94.2 


  


  


 


 


Glucose Level


  89 mg/dL


(70-99) 


  


 


 


Calcium Level


  9.2 mg/dL


(8.5-10.1) 


  


 


 


Urine Collection Type  Unknown  


 


Urine Color  Yellow  


 


Urine Clarity  Clear  


 


Urine pH  7.5  


 


Urine Specific Gravity  1.010  


 


Urine Protein


  


  Negative mg/dL


(NEG-TRACE) 


 


 


Urine Glucose (UA)


  


  Negative mg/dL


(NEG) 


 


 


Urine Ketones (Stick)  15 mg/dL (NEG)  


 


Urine Blood  Negative (NEG)  


 


Urine Nitrite  Negative (NEG)  


 


Urine Bilirubin  Negative (NEG)  


 


Urine Urobilinogen Dipstick


  


  0.2 mg/dL (0.2


mg/dL) 


 


 


Urine Leukocyte Esterase  Small (NEG)  


 


Urine RBC  0 /HPF (0-2)  


 


Urine WBC  1-4 /HPF (0-4)  


 


Urine Squamous Epithelial


Cells 


  Mod /LPF 


  


 


 


Urine Bacteria


  


  Moderate /HPF


(0-FEW) 


 


 


Bedside Urine HCG, Qualitative


  


  


  Hcg negative


(Negative)








Medications





Current Medications


Diphenhydramine HCl (Benadryl) 50 mg 1X  ONCE IVP  Last administered on 9/13/ 17at 17:45;  Start 9/13/17 at 17:30;  Stop 9/13/17 at 17:31;  Status DC


Promethazine HCl 12.5 mg/Sodium Chloride 50.5 ml @  151.5 mls/ hr PRN Q6HRS  

PRN IV NAUSEA/VOMITING Last administered on 9/13/17at 17:50;  Start 9/13/17 at 

17:30


Methylprednisolone Sodium Succinate (SOLU-Medrol 125MG VIAL) 125 mg 1X  ONCE IV

  Last administered on 9/13/17at 17:45;  Start 9/13/17 at 17:30;  Stop 9/13/17 

at 17:31;  Status DC


Sodium Chloride 1,000 ml @  1,000 mls/hr 1X  ONCE IV  Last administered on 9/13/ 17at 17:49;  Start 9/13/17 at 17:30;  Stop 9/13/17 at 18:29;  Status DC


Hydromorphone HCl (Dilaudid) 1 mg PRN Q15MIN  PRN IV/SQ PAIN GREATER THAN 3/10 

Last administered on 9/13/17at 20:03;  Start 9/13/17 at 18:45;  Stop 9/14/17 at 

00:23;  Status DC


Magnesium Sulfate/ Dextrose 100 ml @  100 mls/hr 1X  ONCE IV  Last administered 

on 9/13/17at 18:57;  Start 9/13/17 at 18:45;  Stop 9/13/17 at 19:44;  Status DC


Ketorolac Tromethamine (Toradol) 30 mg 1X  ONCE IV  Last administered on 9/13/ 17at 18:56;  Start 9/13/17 at 18:45;  Stop 9/13/17 at 18:47;  Status DC


Lidocaine HCl (Xylocaine-Mpf 1% Vial) 5 ml 1X  ONCE NEB  Last administered on 9/ 13/17at 19:53;  Start 9/13/17 at 19:45;  Stop 9/13/17 at 19:46;  Status DC


Diphenhydramine HCl (Benadryl) 25 mg 1X  ONCE IVP  Last administered on 9/13/ 17at 20:02;  Start 9/13/17 at 20:00;  Stop 9/13/17 at 20:01;  Status DC


Ondansetron HCl (Zofran) 4 mg PRN Q8HRS  PRN IV NAUSEA/VOMITING Last 

administered on 9/13/17at 21:11;  Start 9/13/17 at 21:00;  Stop 9/14/17 at 20:59


Dextrose/Sodium Chloride 1,000 ml @  125 mls/hr 1X  ONCE IV  Last administered 

on 9/13/17at 21:15;  Start 9/13/17 at 21:15;  Stop 9/14/17 at 05:14;  Status DC


Hydromorphone HCl (Dilaudid) 3 mg 1X  ONCE IV  Last administered on 9/14/17at 00

:49;  Start 9/14/17 at 00:45;  Stop 9/14/17 at 00:46;  Status DC


Clonazepam (KlonoPIN) 1 mg PRN TID  PRN PO ANXIETY Last administered on 9/14/ 17at 09:18;  Start 9/14/17 at 00:30


Diphenhydramine HCl (Benadryl) 50 mg PRN Q6HRS  PRN PO HEADACHE Last 

administered on 9/14/17at 01:13;  Start 9/14/17 at 00:30


Acetaminophen/ Hydrocodone Bitart (Lortab 5/325) 1 tab PRN Q4HRS  PRN PO 

MODERATE PAIN Last administered on 9/14/17at 02:25;  Start 9/14/17 at 00:30


Nifedipine (Procardia Xl) 30 mg HS PO ;  Start 9/14/17 at 21:00


Tizanidine HCl (Zanaflex) 4 mg PRN QID  PRN PO MUSCLE SPASMS Last administered 

on 9/14/17at 00:53;  Start 9/14/17 at 00:30


Non-Formulary Medication 1 ml PRN BID  PRN NS MIGRAINE HEADACHE;  Start 9/14/17 

at 00:30;  Status UNV


Dronabinol (Marinol) 5 mg PRN TID  PRN PO NAUSEA Last administered on 9/14/17at 

09:18;  Start 9/14/17 at 11:30


Non-Formulary Medication 1 tab DAILY PO ;  Start 9/14/17 at 09:00;  Status UNV


Ondansetron HCl (Zofran Odt) 8 mg PRN Q8HRS  PRN PO NAUSEA/VOMITING;  Start 9/14 /17 at 00:30


Quetiapine Fumarate (SEROquel XR) 300 mg QHS PO ;  Start 9/14/17 at 21:00


Potassium Chloride 30 meq/ Sodium Chloride 1,015 ml @  75 mls/hr [id number] IV  

Last administered on 9/14/17at 01:14;  Start 9/14/17 at 01:00





Active Scripts


Active


Reported


Dihydroergotamine Mesylate 1 Ml Spray.pump 1 Ml NS PRN BID PRN


Hydrocodone-Apap 5-325  ** (Hydrocodone Bit/Acetaminophen) 1 Each Tablet 1 Tab 

PO PRN Q4HRS PRN


Loestrin (Norethindrone A-E Estradiol) 1 Each Tablet 1 Tab PO DAILY


Clonazepam 1 Mg Tablet 1 Mg PO PRN TID PRN


Tizanidine Hcl 4 Mg Tablet 4 Mg PO QID PRN


Seroquel (Quetiapine Fumarate) 300 Mg Tablet 300 Mg PO HS


Marinol (Dronabinol) 5 Mg Capsule 5 Mg PO PRN TID PRN


Nifedipine Er (Nifedipine) 30 Mg Tab.er.24 1 Tab PO HS


Benadryl (Diphenhydramine Hcl) 25 Mg Capsule 2 Cap PO PRN Q6-8HRS PRN


Zofran (Ondansetron Hcl) 8 Mg Tablet 8 Mg PO BID PRN


Vitals/I & O





Vital Sign - Last 24 Hours








 9/13/17 9/13/17 9/13/17 9/13/17





 17:00 17:37 18:07 18:37


 


Temp 98.0   





 98.0   


 


Pulse 98 86 92 90


 


Resp 20 18 18 18


 


B/P (MAP) 195/105 (135) 149/74 (99) 158/88 (111) 164/79 (107)


 


Pulse Ox 98 97 92 90


 


O2 Delivery Room Air Room Air Room Air Room Air


 


    





    





 9/13/17 9/13/17 9/13/17 9/13/17





 18:56 19:30 20:00 20:03


 


Pulse  100 100 


 


Resp 20 18 18 18


 


B/P (MAP)  153/82 (105) 132/75 (94) 


 


Pulse Ox  98 99 99


 


O2 Delivery Room Air Nasal Cannula Nasal Cannula Nasal Cannula


 


O2 Flow Rate  2.0 2.0 





 9/13/17 9/13/17 9/13/17 9/13/17





 21:00 21:25 22:45 23:00


 


Temp  98.6  





  98.6  


 


Pulse 91 95  


 


Resp 18 18  


 


B/P (MAP) 135/77 (96) 136/82 (100)  


 


Pulse Ox 99 92  


 


O2 Delivery NonRebreather Mask Room Air Room Air 


 


O2 Flow Rate 15.0   


 


    





    





 9/14/17 9/14/17 9/14/17 9/14/17





 00:49 01:29 02:25 03:00


 


Temp    97.7





    97.7


 


Pulse    87


 


Resp 18 18 18 16


 


B/P (MAP)    140/79 (99)


 


Pulse Ox 92  92 94


 


O2 Delivery Room Air   Room Air


 


    





    





 9/14/17 9/14/17 9/14/17 





 03:28 07:00 07:40 


 


Temp  98.4  





  98.4  


 


Pulse  74  


 


Resp 18 18  


 


B/P (MAP)  100/53 (69)  


 


Pulse Ox 94 90  


 


O2 Delivery Room Air Room Air Room Air 














Intake and Output   


 


 9/14/17 9/14/17 9/15/17





 15:00 23:00 07:00


 


Intake Total 540 ml  


 


Output Total 300 ml  


 


Balance 240 ml  

















ARTEMIO GUPTA III DO Sep 14, 2017 12:47

## 2017-09-14 NOTE — ACF
Admission Forms Criteria


                                                       HEADACHES





Clinical Indications for Admission to Inpatient Care





                                                                  ([place]/check 

or initial the applicable condition/criteria): 


                                                                           


Admission is indicated for 1 or more of the following(1)(2)(3)(4)(5):





I. Unruptured but threatening aneurysm or vascular malformation 


II. Venous sinus thrombosis 


III. Increased intracranial pressure 


IV. Cerebral spinal fluid leak 


V Medication-overuse headachethat has failed all outpatient management options(6

)(7)(8) 


VI. Vasculitis (eg, giant cell (temporal) arteritis, central nervous system 

vasculitis) requiring intravenous 


     corticosteroids, intravenous antithrombotic therapy, or inpatient 

monitoring (e.g., visual symptomsor 


     findings, other ischemic manifestations)[A](9)


VII. Severe (new) neurologic findings requiring inpatient care as indicated by 

1 or more of the following(10)(11)(12)


a) Papilledema 


b) Cerebral edema 


c) mass effect on CT scan 


d) Cerebral spinal fluid leak (13)


e) Hydrocephalus(14)(15)


f) Uncontrolled seizures(9)(16)


[X] VIII Inpatient admission required rather than observational care (See 

Headaches: Observation Care 


     as appropriate) because of 1 or more of the following(17): 


    [X]  a) Severe pain requiring acute inpatient management 


     b) Altered mental status that is severe or persistent 


     c) Vomiting that is severe or persistent 


     d) Dehydration that is severe or persistent 


     e) New-onset focal neurologic deficit that is severe or persistent (eg, 

does not resolve during


         observation care) (18)


f) Hypertension requiring inpatient treatment 


g) IV fluid required rather than oral rehydration to replace significant 

ongoing (eg, for greater than 24 


   hours) losses (greater than 200 ml/hr or 3L/m2 per day) 


h) Cerebral bleeding, hydrocephalus, or vasospasm monitoring (19) 


i) Increased intracranial pressure or cerebral edema monitoring 


j) Other condition treatment or monitoring requiring inpatient admission








Extended stay beyond goal length of stay may be needed for (36)(37):





a)    Intractable migraine  


b)    Giant cell (temporal) arteritis with visual or other ischemic signs or 

symptoms(1)


c)    Subarachnoid or intracranial hemorrhage  


d)    Malignant hypertension


e)    Detoxification from drug withdrawal in medication-overuse headache (6)(8)








The original [facility] content created by Damon Chavez has been revised.


The portions of the content which have been revised are identified through the 

use of italic text or in bold, and [facility] 


has neither reviewed nor approved the modified material.All other unmodified 

content is copyright  [facility].





Please see references footnoted in the original [facility] edition 

2016


Admission Criteria Met?:  Yes











ALE GARBER Sep 14, 2017 01:45

## 2017-09-15 NOTE — PDOC
PROGRESS NOTES


Assessment


Assessment


Intractable migraine headache.


Chronic migraine headache.


Pseudotumor cerebri ?


HTN


OA


Over weight.


No evidence of Chiari malformation on MRI.


No evidence of Chiari 1 malformation. 





RECOMMENDATIONS/PLAN:


Pain control.


Metoprolol 25 mg daily.


LP to measure CSF opening pressure performed on 9/15/17, reports still pending.


Discussed with patient and her  at bedside on 9/15/17 after LP.





HISTORY OF THE PRESENT ILLNESS:


37-y-old  female patient with long standing history of chronic 

migraine headache since teenage age. She used to have frequent headaches about 

15 days or more each month. Her headaches are at frontal head, around orbital 

area spreading to top, side and back of head as sharp pain. She rated her 

headaches 8-10/10. She was treated with multiple medications including Triptan, 

Topamax, Neurontin, Botox, DHE, etc, but stated not help. She has HTN and has 

been on a new calcium channel blocker recently but seemed increased her 

headaches. No symptoms of mental status changes, projectile vomiting, blurred 

vision, diplopia, ataxia, aphasia, numbness or weakness.





PAST MEDICAL HISTORY:


Please see above.





PAST SURGERY HISTORY: 


No major surgery recently.





ALLERGY:


Benztropine


Dexamethason





MEDICATIONS:


Refer to MAR





FAMILY HISTORY: 


Mother has migraine headache.


Father has aura w/o headache.





SOCIAL HISTORY: 


Lives with her  at home.


Denies smoking, drinking, and illicit drug use.  





REVIEW OF SYSTEMS:


Constitutional: No malnutrition, weight loss, cachexia.


Head: No traumatic brain or head injury.


Skin: No edema, or rash.


Ear: No infection, tinnitus.


Eyes: No vision loss or color blindness.


Nose: No bleeding or purulent discharges.


Hearing: No hearing decrease.


Neck: No injury.


Breast: No history of cancer, masses,or discharges.


Cardiac:HTN.


Pulmonary: No OPD.


GI: No GI ulcer, GI bleeding.


Urinary/genital: UTI.


Endocrinologic: Over weight.


Skeletomuscular: No muscular atrophy, deformity.


Neurological: see  HP.


Psychiatric: Denies drug use/abuse.


Otherwise, not pertinant14-point review of systems.





PHYSICAL EXAMINATION:   


General appearance is in subacute distress.  


HEENT:  Normocephalic and nontraumatic.  Eyes, nose, ears, and throat are 

unremarkable.  


Neck is supple. No lymphadenopathy. No crepitus. 


Cardiovascular:  S1, S2, regular rate and rhythm.  


Pulmonary:  Clear to auscultation bilaterally. 


Abdomen:  Bowel sounds are positive.     


Extremities:  No rash, lesions, or edema.  


No restriction of range of motion





NEUROLOGICAL  EXAMINATION:


Alert 


Oriented to time, place and person.


Pupils equal round. Not be able to tolerate light stimuli.


EOMI.


CN: no focal findings.


Muscle tone: within normal.


Muscle strength: 5


DTR: 2


Plantar reflex: Flexor response bilaterally 


Gait: not examined in bed. 


Sensory exam: no abnormal findings.





Objective


Objective





Vital Signs








  Date Time  Temp Pulse Resp B/P (MAP) Pulse Ox O2 Delivery O2 Flow Rate FiO2


 


9/15/17 11:00 98.1 75 12 112/59 (76) 96 Room Air  





 98.1       














Intake and Output 


 


 9/16/17





 07:00


 


Intake Total 150 ml


 


Balance 150 ml


 


 


 


Intake Oral 150 ml











Vitals Signs


Vitals





 VS - Last 72 Hours, by Label








  Date Time  Temp Pulse Resp B/P (MAP) Pulse Ox O2 Delivery O2 Flow Rate FiO2


 


9/15/17 11:00 98.1 75 12 112/59 (76) 96 Room Air  





 98.1       


 


9/15/17 09:07  68  117/65    


 


9/15/17 08:00      Room Air  


 


9/15/17 07:00 98.1 68 16 117/65 (82) 96 Room Air  





 98.1       


 


9/15/17 00:13     95 Room Air  


 


9/14/17 23:00 97.9 84 20 148/80 (102) 95 Room Air  





 97.9       


 


9/14/17 20:20  89  133/72    


 


9/14/17 20:20  89  133/72    


 


9/14/17 20:00      Room Air  


 


9/14/17 19:00 98.1 89 20 133/72 (92) 100 Room Air  





 98.1       


 


9/14/17 18:02      Room Air  


 


9/14/17 17:12      Room Air  


 


9/14/17 15:00 98.1 76 18 122/47 (72) 94 Room Air  





 98.1       


 


9/14/17 07:40      Room Air  


 


9/14/17 07:00 98.4 74 18 100/53 (69) 90 Room Air  





 98.4       











Laboratory


Laboratory





Laboratory Tests








Test


  9/14/17


16:00 9/15/17


04:11 9/15/17


10:50


 


Prothrombin Time


  13.2 SEC


(11.7-14.0) 


  


 


 


Prothromb Time International


Ratio 1.1 (0.8-1.1) 


  


  


 


 


White Blood Count


  


  9.1 x10^3/uL


(4.0-11.0) 


 


 


Red Blood Count


  


  4.12 x10^6/uL


(3.50-5.40) 


 


 


Hemoglobin


  


  12.1 g/dL


(12.0-15.5) 


 


 


Hematocrit


  


  35.5 %


(36.0-47.0) 


 


 


Mean Corpuscular Volume  86 fL ()  


 


Mean Corpuscular Hemoglobin  29 pg (25-35)  


 


Mean Corpuscular Hemoglobin


Concent 


  34 g/dL


(31-37) 


 


 


Red Cell Distribution Width


  


  13.1 %


(11.5-14.5) 


 


 


Platelet Count


  


  228 x10^3/uL


(140-400) 


 


 


Neutrophils (%) (Auto)  59 % (31-73)  


 


Lymphocytes (%) (Auto)  33 % (24-48)  


 


Monocytes (%) (Auto)  7 % (0-9)  


 


Eosinophils (%) (Auto)  1 % (0-3)  


 


Basophils (%) (Auto)  1 % (0-3)  


 


Neutrophils # (Auto)


  


  5.3 x10^3uL


(1.8-7.7) 


 


 


Lymphocytes # (Auto)


  


  3.0 x10^3/uL


(1.0-4.8) 


 


 


Monocytes # (Auto)


  


  0.6 x10^3/uL


(0.0-1.1) 


 


 


Eosinophils # (Auto)


  


  0.1 x10^3/uL


(0.0-0.7) 


 


 


Basophils # (Auto)


  


  0.0 x10^3/uL


(0.0-0.2) 


 


 


Sodium Level


  


  141 mmol/L


(136-145) 


 


 


Potassium Level


  


  4.1 mmol/L


(3.5-5.1) 


 


 


Chloride Level


  


  107 mmol/L


() 


 


 


Carbon Dioxide Level


  


  24 mmol/L


(21-32) 


 


 


Anion Gap  10 (6-14)  


 


Blood Urea Nitrogen


  


  11 mg/dL


(7-20) 


 


 


Creatinine


  


  0.7 mg/dL


(0.6-1.0) 


 


 


Estimated GFR


(Cockcroft-Gault) 


  94.2 


  


 


 


Glucose Level


  


  102 mg/dL


(70-99) 


 


 


Calcium Level


  


  8.4 mg/dL


(8.5-10.1) 


 


 


CSF Color   Colorless 


 


CSF Clarity   Clear 


 


CSF WBC   0 


 


CSF RBC   0 


 


CSF Glucose


  


  


  69 mg/dL


(37-70)


 


CSF Total Protein


  


  


  40.4 mg/dL


(15.0-45.0)








Microbiology


9/15/17 Gram Stain - Final, Complete





Medication


Medications





Current Medications


Gadobutrol (Gadavist) 7.5 mmol 1X  ONCE IV  Last administered on 9/14/17at 14:20

;  Start 9/14/17 at 14:00;  Stop 9/14/17 at 14:01;  Status DC


Metoprolol Succinate (Toprol Xl) 25 mg DAILY PO  Last administered on 9/15/17at 

09:07;  Start 9/14/17 at 19:00


Nifedipine (Procardia Xl) 30 mg HS PO ;  Start 9/14/17 at 21:00


Quetiapine Fumarate (SEROquel XR) 300 mg QHS PO  Last administered on 9/14/17at 

20:20;  Start 9/14/17 at 21:00





Comment


Review of Relevant


I have reviewed the following items amber (where applicable) has been applied.











ENRIQUE HERNANDEZ MD Sep 15, 2017 13:57

## 2017-09-15 NOTE — RAD
Indication suspect elevated intracranial pressure.



Lumbar puncture for purposes of acquiring CSF and measuring pressures was

requested.



Lumbar puncture was explained to the patient. The risks of infection and

bleeding were outlined. The possibility of postprocedure headache

necessitating placement of a patch was also discussed. The patient understood

the risks associated with the procedure and wished to proceed.



The patient was placed in a left side down decubitus position. An appropriate

level for entry was palpated. Under fluoroscopic guidance the skin was marked

and an entry level was selected.



The skin was prepped and draped in the routine fashion. A midline approach,

again with the patient in a left side down decubitus position, was utilized. A

20-gauge spinal needle was utilized. The subarachnoid space was entered at

L2-3. Clear CSF was encountered. The opening pressure was 18 mmHg. The closing

pressure was 16. A single fluoroscopic spot image was obtained associated with

the exam. Approximately 9 cc of fluid was withdrawn and  into 4 tubes

and sent to the laboratory for analysis. Fluoroscopy time associated with the

procedure was 1.3 minutes. The patient tolerated the procedure unremarkably



IMPRESSION: Successful lumbar puncture for purposes of acquiring CSF and

measuring pressures.

## 2017-09-15 NOTE — PDOC
PROGRESS NOTES


Chief Complaint


Chief Complaint


Intractable headache





PMH: 


migraine headaches


htn


OA





History of Present Illness


History of Present Illness


Pt laying in bed,  at bedside.  LP planned for today to measure CSF 

opening pressure.





Neuro following





Angiography MRI of brain:  


IMPRESSION: Normal intracranial MRA.





Head CT: 


IMPRESSION: Negative study.





Vitals


Vitals





Vital Signs








  Date Time  Temp Pulse Resp B/P (MAP) Pulse Ox O2 Delivery O2 Flow Rate FiO2


 


9/15/17 09:07  68  117/65    


 


9/15/17 07:00 98.1  16  96 Room Air  





 98.1       











Physical Exam


General:  Alert, No acute distress


Heart:  Regular rate, No murmurs


Lungs:  Clear, Other (no crackles, no wheezes)


Abdomen:  Soft, No tenderness


Extremities:  No clubbing, No cyanosis


Skin:  No rashes, No breakdown





Labs


LABS





Laboratory Tests








Test


  9/14/17


16:00 9/15/17


04:11


 


Prothrombin Time


  13.2 SEC


(11.7-14.0) 


 


 


Prothromb Time International


Ratio 1.1 (0.8-1.1) 


  


 


 


White Blood Count


  


  9.1 x10^3/uL


(4.0-11.0)


 


Red Blood Count


  


  4.12 x10^6/uL


(3.50-5.40)


 


Hemoglobin


  


  12.1 g/dL


(12.0-15.5)


 


Hematocrit


  


  35.5 %


(36.0-47.0)


 


Mean Corpuscular Volume  86 fL () 


 


Mean Corpuscular Hemoglobin  29 pg (25-35) 


 


Mean Corpuscular Hemoglobin


Concent 


  34 g/dL


(31-37)


 


Red Cell Distribution Width


  


  13.1 %


(11.5-14.5)


 


Platelet Count


  


  228 x10^3/uL


(140-400)


 


Neutrophils (%) (Auto)  59 % (31-73) 


 


Lymphocytes (%) (Auto)  33 % (24-48) 


 


Monocytes (%) (Auto)  7 % (0-9) 


 


Eosinophils (%) (Auto)  1 % (0-3) 


 


Basophils (%) (Auto)  1 % (0-3) 


 


Neutrophils # (Auto)


  


  5.3 x10^3uL


(1.8-7.7)


 


Lymphocytes # (Auto)


  


  3.0 x10^3/uL


(1.0-4.8)


 


Monocytes # (Auto)


  


  0.6 x10^3/uL


(0.0-1.1)


 


Eosinophils # (Auto)


  


  0.1 x10^3/uL


(0.0-0.7)


 


Basophils # (Auto)


  


  0.0 x10^3/uL


(0.0-0.2)


 


Sodium Level


  


  141 mmol/L


(136-145)


 


Potassium Level


  


  4.1 mmol/L


(3.5-5.1)


 


Chloride Level


  


  107 mmol/L


()


 


Carbon Dioxide Level


  


  24 mmol/L


(21-32)


 


Anion Gap  10 (6-14) 


 


Blood Urea Nitrogen


  


  11 mg/dL


(7-20)


 


Creatinine


  


  0.7 mg/dL


(0.6-1.0)


 


Estimated GFR


(Cockcroft-Gault) 


  94.2 


 


 


Glucose Level


  


  102 mg/dL


(70-99)


 


Calcium Level


  


  8.4 mg/dL


(8.5-10.1)











Review of Systems


Review of Systems


headache


fatigue





Assessment and Plan


Assessmemt and Plan


Problems


Medical Problems:


(1) Migraine


Status: Acute  





Migraine


hypertension





1. Neuro: LP planned today, awaiting rec


2. CT of head and MRA of brain both negative


3. Continue pain medicine; Percocet 5 mg PO q 8 hr prn #5 written, in chart


4. Continue metoprolol


5. Possible DC if LP negative and ok with neuro


6. If DC f/up with pcp 1 week





Problems:  





Comment


Review of Relevant


I have reviewed the following items amber (where applicable) has been applied.


Labs





Laboratory Tests








Test


  9/13/17


17:30 9/13/17


18:20 9/13/17


18:27 9/14/17


16:00


 


White Blood Count


  6.7 x10^3/uL


(4.0-11.0) 


  


  


 


 


Red Blood Count


  4.51 x10^6/uL


(3.50-5.40) 


  


  


 


 


Hemoglobin


  13.3 g/dL


(12.0-15.5) 


  


  


 


 


Hematocrit


  38.8 %


(36.0-47.0) 


  


  


 


 


Mean Corpuscular Volume 86 fL ()    


 


Mean Corpuscular Hemoglobin 29 pg (25-35)    


 


Mean Corpuscular Hemoglobin


Concent 34 g/dL


(31-37) 


  


  


 


 


Red Cell Distribution Width


  12.9 %


(11.5-14.5) 


  


  


 


 


Platelet Count


  243 x10^3/uL


(140-400) 


  


  


 


 


Neutrophils (%) (Auto) 56 % (31-73)    


 


Lymphocytes (%) (Auto) 33 % (24-48)    


 


Monocytes (%) (Auto) 8 % (0-9)    


 


Eosinophils (%) (Auto) 3 % (0-3)    


 


Basophils (%) (Auto) 1 % (0-3)    


 


Neutrophils # (Auto)


  3.7 x10^3uL


(1.8-7.7) 


  


  


 


 


Lymphocytes # (Auto)


  2.2 x10^3/uL


(1.0-4.8) 


  


  


 


 


Monocytes # (Auto)


  0.5 x10^3/uL


(0.0-1.1) 


  


  


 


 


Eosinophils # (Auto)


  0.2 x10^3/uL


(0.0-0.7) 


  


  


 


 


Basophils # (Auto)


  0.0 x10^3/uL


(0.0-0.2) 


  


  


 


 


Erythrocyte Sedimentation Rate 13 (0-25)    


 


Sodium Level


  138 mmol/L


(136-145) 


  


  


 


 


Potassium Level


  3.3 mmol/L


(3.5-5.1) 


  


  


 


 


Chloride Level


  100 mmol/L


() 


  


  


 


 


Carbon Dioxide Level


  26 mmol/L


(21-32) 


  


  


 


 


Anion Gap 12 (6-14)    


 


Blood Urea Nitrogen


  13 mg/dL


(7-20) 


  


  


 


 


Creatinine


  0.7 mg/dL


(0.6-1.0) 


  


  


 


 


Estimated GFR


(Cockcroft-Gault) 94.2 


  


  


  


 


 


Glucose Level


  89 mg/dL


(70-99) 


  


  


 


 


Calcium Level


  9.2 mg/dL


(8.5-10.1) 


  


  


 


 


Urine Collection Type  Unknown   


 


Urine Color  Yellow   


 


Urine Clarity  Clear   


 


Urine pH  7.5   


 


Urine Specific Gravity  1.010   


 


Urine Protein


  


  Negative mg/dL


(NEG-TRACE) 


  


 


 


Urine Glucose (UA)


  


  Negative mg/dL


(NEG) 


  


 


 


Urine Ketones (Stick)  15 mg/dL (NEG)   


 


Urine Blood  Negative (NEG)   


 


Urine Nitrite  Negative (NEG)   


 


Urine Bilirubin  Negative (NEG)   


 


Urine Urobilinogen Dipstick


  


  0.2 mg/dL (0.2


mg/dL) 


  


 


 


Urine Leukocyte Esterase  Small (NEG)   


 


Urine RBC  0 /HPF (0-2)   


 


Urine WBC  1-4 /HPF (0-4)   


 


Urine Squamous Epithelial


Cells 


  Mod /LPF 


  


  


 


 


Urine Bacteria


  


  Moderate /HPF


(0-FEW) 


  


 


 


Bedside Urine HCG, Qualitative


  


  


  Hcg negative


(Negative) 


 


 


Prothrombin Time


  


  


  


  13.2 SEC


(11.7-14.0)


 


Prothromb Time International


Ratio 


  


  


  1.1 (0.8-1.1) 


 


 


Test


  9/15/17


04:11 


  


  


 


 


White Blood Count


  9.1 x10^3/uL


(4.0-11.0) 


  


  


 


 


Red Blood Count


  4.12 x10^6/uL


(3.50-5.40) 


  


  


 


 


Hemoglobin


  12.1 g/dL


(12.0-15.5) 


  


  


 


 


Hematocrit


  35.5 %


(36.0-47.0) 


  


  


 


 


Mean Corpuscular Volume 86 fL ()    


 


Mean Corpuscular Hemoglobin 29 pg (25-35)    


 


Mean Corpuscular Hemoglobin


Concent 34 g/dL


(31-37) 


  


  


 


 


Red Cell Distribution Width


  13.1 %


(11.5-14.5) 


  


  


 


 


Platelet Count


  228 x10^3/uL


(140-400) 


  


  


 


 


Neutrophils (%) (Auto) 59 % (31-73)    


 


Lymphocytes (%) (Auto) 33 % (24-48)    


 


Monocytes (%) (Auto) 7 % (0-9)    


 


Eosinophils (%) (Auto) 1 % (0-3)    


 


Basophils (%) (Auto) 1 % (0-3)    


 


Neutrophils # (Auto)


  5.3 x10^3uL


(1.8-7.7) 


  


  


 


 


Lymphocytes # (Auto)


  3.0 x10^3/uL


(1.0-4.8) 


  


  


 


 


Monocytes # (Auto)


  0.6 x10^3/uL


(0.0-1.1) 


  


  


 


 


Eosinophils # (Auto)


  0.1 x10^3/uL


(0.0-0.7) 


  


  


 


 


Basophils # (Auto)


  0.0 x10^3/uL


(0.0-0.2) 


  


  


 


 


Sodium Level


  141 mmol/L


(136-145) 


  


  


 


 


Potassium Level


  4.1 mmol/L


(3.5-5.1) 


  


  


 


 


Chloride Level


  107 mmol/L


() 


  


  


 


 


Carbon Dioxide Level


  24 mmol/L


(21-32) 


  


  


 


 


Anion Gap 10 (6-14)    


 


Blood Urea Nitrogen


  11 mg/dL


(7-20) 


  


  


 


 


Creatinine


  0.7 mg/dL


(0.6-1.0) 


  


  


 


 


Estimated GFR


(Cockcroft-Gault) 94.2 


  


  


  


 


 


Glucose Level


  102 mg/dL


(70-99) 


  


  


 


 


Calcium Level


  8.4 mg/dL


(8.5-10.1) 


  


  


 








Laboratory Tests








Test


  9/14/17


16:00 9/15/17


04:11


 


Prothrombin Time


  13.2 SEC


(11.7-14.0) 


 


 


Prothromb Time International


Ratio 1.1 (0.8-1.1) 


  


 


 


White Blood Count


  


  9.1 x10^3/uL


(4.0-11.0)


 


Red Blood Count


  


  4.12 x10^6/uL


(3.50-5.40)


 


Hemoglobin


  


  12.1 g/dL


(12.0-15.5)


 


Hematocrit


  


  35.5 %


(36.0-47.0)


 


Mean Corpuscular Volume  86 fL () 


 


Mean Corpuscular Hemoglobin  29 pg (25-35) 


 


Mean Corpuscular Hemoglobin


Concent 


  34 g/dL


(31-37)


 


Red Cell Distribution Width


  


  13.1 %


(11.5-14.5)


 


Platelet Count


  


  228 x10^3/uL


(140-400)


 


Neutrophils (%) (Auto)  59 % (31-73) 


 


Lymphocytes (%) (Auto)  33 % (24-48) 


 


Monocytes (%) (Auto)  7 % (0-9) 


 


Eosinophils (%) (Auto)  1 % (0-3) 


 


Basophils (%) (Auto)  1 % (0-3) 


 


Neutrophils # (Auto)


  


  5.3 x10^3uL


(1.8-7.7)


 


Lymphocytes # (Auto)


  


  3.0 x10^3/uL


(1.0-4.8)


 


Monocytes # (Auto)


  


  0.6 x10^3/uL


(0.0-1.1)


 


Eosinophils # (Auto)


  


  0.1 x10^3/uL


(0.0-0.7)


 


Basophils # (Auto)


  


  0.0 x10^3/uL


(0.0-0.2)


 


Sodium Level


  


  141 mmol/L


(136-145)


 


Potassium Level


  


  4.1 mmol/L


(3.5-5.1)


 


Chloride Level


  


  107 mmol/L


()


 


Carbon Dioxide Level


  


  24 mmol/L


(21-32)


 


Anion Gap  10 (6-14) 


 


Blood Urea Nitrogen


  


  11 mg/dL


(7-20)


 


Creatinine


  


  0.7 mg/dL


(0.6-1.0)


 


Estimated GFR


(Cockcroft-Gault) 


  94.2 


 


 


Glucose Level


  


  102 mg/dL


(70-99)


 


Calcium Level


  


  8.4 mg/dL


(8.5-10.1)








Medications





Current Medications


Diphenhydramine HCl (Benadryl) 50 mg 1X  ONCE IVP  Last administered on 9/13/ 17at 17:45;  Start 9/13/17 at 17:30;  Stop 9/13/17 at 17:31;  Status DC


Promethazine HCl 12.5 mg/Sodium Chloride 50.5 ml @  151.5 mls/ hr PRN Q6HRS  

PRN IV NAUSEA/VOMITING Last administered on 9/13/17at 17:50;  Start 9/13/17 at 

17:30


Methylprednisolone Sodium Succinate (SOLU-Medrol 125MG VIAL) 125 mg 1X  ONCE IV

  Last administered on 9/13/17at 17:45;  Start 9/13/17 at 17:30;  Stop 9/13/17 

at 17:31;  Status DC


Sodium Chloride 1,000 ml @  1,000 mls/hr 1X  ONCE IV  Last administered on 9/13/ 17at 17:49;  Start 9/13/17 at 17:30;  Stop 9/13/17 at 18:29;  Status DC


Hydromorphone HCl (Dilaudid) 1 mg PRN Q15MIN  PRN IV/SQ PAIN GREATER THAN 3/10 

Last administered on 9/13/17at 20:03;  Start 9/13/17 at 18:45;  Stop 9/14/17 at 

00:23;  Status DC


Magnesium Sulfate/ Dextrose 100 ml @  100 mls/hr 1X  ONCE IV  Last administered 

on 9/13/17at 18:57;  Start 9/13/17 at 18:45;  Stop 9/13/17 at 19:44;  Status DC


Ketorolac Tromethamine (Toradol) 30 mg 1X  ONCE IV  Last administered on 9/13/ 17at 18:56;  Start 9/13/17 at 18:45;  Stop 9/13/17 at 18:47;  Status DC


Lidocaine HCl (Xylocaine-Mpf 1% Vial) 5 ml 1X  ONCE NEB  Last administered on 9/ 13/17at 19:53;  Start 9/13/17 at 19:45;  Stop 9/13/17 at 19:46;  Status DC


Diphenhydramine HCl (Benadryl) 25 mg 1X  ONCE IVP  Last administered on 9/13/ 17at 20:02;  Start 9/13/17 at 20:00;  Stop 9/13/17 at 20:01;  Status DC


Ondansetron HCl (Zofran) 4 mg PRN Q8HRS  PRN IV NAUSEA/VOMITING Last 

administered on 9/13/17at 21:11;  Start 9/13/17 at 21:00;  Stop 9/14/17 at 20:59

;  Status DC


Dextrose/Sodium Chloride 1,000 ml @  125 mls/hr 1X  ONCE IV  Last administered 

on 9/13/17at 21:15;  Start 9/13/17 at 21:15;  Stop 9/14/17 at 05:14;  Status DC


Hydromorphone HCl (Dilaudid) 3 mg 1X  ONCE IV  Last administered on 9/14/17at 00

:49;  Start 9/14/17 at 00:45;  Stop 9/14/17 at 00:46;  Status DC


Clonazepam (KlonoPIN) 1 mg PRN TID  PRN PO ANXIETY Last administered on 9/14/ 17at 09:18;  Start 9/14/17 at 00:30


Diphenhydramine HCl (Benadryl) 50 mg PRN Q6HRS  PRN PO HEADACHE Last 

administered on 9/14/17at 01:13;  Start 9/14/17 at 00:30


Acetaminophen/ Hydrocodone Bitart (Lortab 5/325) 1 tab PRN Q4HRS  PRN PO 

MODERATE PAIN Last administered on 9/15/17at 00:13;  Start 9/14/17 at 00:30


Nifedipine (Procardia Xl) 30 mg HS PO ;  Start 9/14/17 at 21:00


Tizanidine HCl (Zanaflex) 4 mg PRN QID  PRN PO MUSCLE SPASMS Last administered 

on 9/15/17at 00:13;  Start 9/14/17 at 00:30


Non-Formulary Medication 1 ml PRN BID  PRN NS MIGRAINE HEADACHE;  Start 9/14/17 

at 00:30;  Status UNV


Dronabinol (Marinol) 5 mg PRN TID  PRN PO NAUSEA Last administered on 9/14/17at 

09:18;  Start 9/14/17 at 11:30


Non-Formulary Medication 1 tab DAILY PO ;  Start 9/14/17 at 09:00;  Stop 9/14/ 17 at 12:50;  Status DC


Ondansetron HCl (Zofran Odt) 8 mg PRN Q8HRS  PRN PO NAUSEA/VOMITING;  Start 9/14 /17 at 00:30


Quetiapine Fumarate (SEROquel XR) 300 mg QHS PO  Last administered on 9/14/17at 

20:20;  Start 9/14/17 at 21:00


Potassium Chloride 30 meq/ Sodium Chloride 1,015 ml @  75 mls/hr [id number] IV  

Last administered on 9/14/17at 17:44;  Start 9/14/17 at 01:00


Gadobutrol (Gadavist) 7.5 mmol 1X  ONCE IV  Last administered on 9/14/17at 14:20

;  Start 9/14/17 at 14:00;  Stop 9/14/17 at 14:01;  Status DC


Metoprolol Succinate (Toprol Xl) 25 mg DAILY PO  Last administered on 9/15/17at 

09:07;  Start 9/14/17 at 19:00





Active Scripts


Active


Reported


Dihydroergotamine Mesylate 1 Ml Spray.pump 1 Ml NS PRN BID PRN


Hydrocodone-Apap 5-325  ** (Hydrocodone Bit/Acetaminophen) 1 Each Tablet 1 Tab 

PO PRN Q4HRS PRN


Loestrin (Norethindrone A-E Estradiol) 1 Each Tablet 1 Tab PO DAILY


Clonazepam 1 Mg Tablet 1 Mg PO PRN TID PRN


Tizanidine Hcl 4 Mg Tablet 4 Mg PO QID PRN


Seroquel (Quetiapine Fumarate) 300 Mg Tablet 300 Mg PO HS


Marinol (Dronabinol) 5 Mg Capsule 5 Mg PO PRN TID PRN


Nifedipine Er (Nifedipine) 30 Mg Tab.er.24 1 Tab PO HS


Benadryl (Diphenhydramine Hcl) 25 Mg Capsule 2 Cap PO PRN Q6-8HRS PRN


Zofran (Ondansetron Hcl) 8 Mg Tablet 8 Mg PO BID PRN


Vitals/I & O





Vital Sign - Last 24 Hours








 9/14/17 9/14/17 9/14/17 9/14/17





 15:00 17:12 18:02 19:00


 


Temp 98.1   98.1





 98.1   98.1


 


Pulse 76   89


 


Resp 18   20


 


B/P (MAP) 122/47 (72)   133/72 (92)


 


Pulse Ox 94   100


 


O2 Delivery Room Air Room Air Room Air Room Air


 


    





    





 9/14/17 9/14/17 9/14/17 9/14/17





 20:00 20:20 20:20 23:00


 


Temp    97.9





    97.9


 


Pulse  89 89 84


 


Resp    20


 


B/P (MAP)  133/72 133/72 148/80 (102)


 


Pulse Ox    95


 


O2 Delivery Room Air   Room Air


 


    





    





 9/15/17 9/15/17 9/15/17 





 00:13 07:00 09:07 


 


Temp  98.1  





  98.1  


 


Pulse  68 68 


 


Resp  16  


 


B/P (MAP)  117/65 (82) 117/65 


 


Pulse Ox 95 96  


 


O2 Delivery Room Air Room Air  














Intake and Output   


 


 9/15/17 9/15/17 9/16/17





 15:00 23:00 07:00


 


Intake Total 150 ml  


 


Balance 150 ml  

















ARTEMIO GUPTA III DO Sep 15, 2017 11:22

## 2017-09-19 NOTE — RAD
MRI lumbar spine without and with contrast

 

TECHNIQUE: Multiplanar MRI sequences of the lumbar spine were acquired 

acquired prior to and after intravenous injection of 80 mL gadolinium this

intravenous contrast.

 

HISTORY: Lumbar puncture 4 days ago, continuing headache may be a spinal 

headache, evaluate cerebral spinal fluid leak.

 

Comparisons: None available.

 

FINDINGS: Lumbar vertebral body height and alignment intact. No bone 

marrow edema. Conus medullaris terminates at the upper L1 vertebral level.

No tethering or mass of the cauda equina. No epidural fluid collections. 

No paraspinal fluid collections. There may be mild paraspinous edema 

adjacent of the L3 spinous process perhaps near the region of lumbar 

puncture however there is no fluid collection. No collapse of the thecal 

sac. No pathologic intradural enhancement. Degenerative changes are 

described below.

 

L1-L2: Unremarkable.

 

L2-L3: Unremarkable.

 

L3-L4: Mild facet hypertrophy. No spinal canal or neural foraminal 

stenosis.

 

L4-L5: Disc desiccation, mild disc bulge and areas of enhancing annulus 

tear laterally, mild facet osteophytes, mild neural foraminal stenoses. No

spinal canal stenosis.

 

L5-S1: Disc desiccation, left paracentral and lateral shallow broad-based 

disc protrusion with enhancing annulus tear, no spinal canal or neural 

foraminal stenosis.

 

IMPRESSION: Lower lumbar spine disc disease as described above. No spinal 

canal stenosis. Mild neural foraminal stenoses L4-5. No epidural or 

paraspinous fluid collection or collapse of the dural sac or dural 

enhancement to suggest spinal leak. See discussion above.

 

Electronically signed by: Javon Nino MD (9/19/2017 8:56 PM) 

[facility]

## 2017-09-19 NOTE — PDOC1
History and Physical


Date of Admission


Date of Admission


9/19/17





Identification/Chief Complaint


Chief Complaint


headache


Problems:  





Source


Source:  Patient





History of Present Illness


History of Present Illness





 


37-y-old  female patient with long standing history of chronic 

migraine headache since teenage age was sent from dr. Almendarez office for 

intractable headache. 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 





Pt was just DCed from here last friday, for same reason. Head CT, MRI, MRA , LP 

all neg and ruled out Pseudotumor cerebri . 


Pt said after LP, still severe headache, also back pain from LP. The headache 

is all head, sharp, constant, 10/10, po oxycodone 10 didnot help at home, noise 

and light bother her. + nausea. no vomiting, no fever, chills, cough, sob.


Pt then went to see dr. Almendarez in the office again today, c/o severe headache, 

then dr. Almendarez called me to direct admission.


As per dr. Almendarez, will do MRI w contrast. 





 in the past , pt was treated with multiple medications including Triptan, 

Topamax, Neurontin, Botox, DHE, etc, but stated not help.  She had solumedrol 

before , which helped. 








Past Medical History


Past Medical History


migraine


Cardiovascular:  HTN





Past Surgical History


Past Surgical History:  No pertinent history





Family History


Family History:  Hypertension





Social History


Smoke:  No


ALCOHOL:  none


Drugs:  None





Allergies


Allergies





Allergies








Coded Allergies Type Severity Reaction Last Updated Verified


 


  benztropine Allergy Intermediate SEVERE PAIN 7/10/16 Yes


 


  dexamethasone Allergy Intermediate SEVERE PAIN 7/10/16 Yes











ROS


Review of System


CONSTITUTIONAL:        No fever or chills


EYES:                          No recent changes


SKIN:               No rash or itching


CARDIOVASCULAR:     No chest pain, syncope, palpitations, or edema


RESPIRATORY:            No SOB or cough


GASTROINTESTINAL:    No nausea, vomiting or abdominal pain


NEUROLOGICAL:          No headaches or weakness


ENDOCRINE:               No cold or heat intolerance


GENITOURINARY:         No urgency or frequency of urination


MUSCULOSKELETAL:   No back pain or joint pain


LYMPHATICS:               No enlarged lymph nodes


PSYCHIATRIC:              No anxiety or depression





Physical Exam


Physical Exam


GEN.:    No apparent distress.  Alert and oriented.


HEENT:    Head is normocephalic, atraumatic


NECK:    Supple.  


LUNGS:    Clear to auscultation.


HEART:    RRR, S1, S2 present.  Peripheral pulses intact


ABDOMEN:    Soft, nontender.  Positive bowel sounds.


EXTREMITIES:    Without any cyanosis.    


NEUROLOGIC:     Normal speech, normal tone


PSYCHIATRIC:    Normal affect, normal mood.


SKIN:   No ulcerations





Vitals


Vitals





Vital Signs








  Date Time  Temp Pulse Resp B/P (MAP) Pulse Ox O2 Delivery O2 Flow Rate FiO2


 


9/19/17 16:38 97.8 78 18 149/84 (105) 97 Room Air  





 97.8       











VTE Prophylaxis Ordered


VTE Prophylaxis Devices:  Yes


VTE Pharmacological Prophylaxi:  Yes





Assessment/Plan


Assessment/Plan


   Intractable  headache 2/2 migraine flare likely


Chronic migraine headache


HTN


OA


Over weight.


 





plan;


will get neuro consult


brain mri w/wo contrast


need verify home meds


supportive care for pain control, and nausea control


dvt ppx











CELE DASILVA MD Sep 19, 2017 17:30

## 2017-09-20 NOTE — PDOC2
NEUROLOGY CONSULT


Date of Admission


Date of Admission


DATE: 9/20/17 


TIME: 08:46





Reason for Consult


Reason for Consult:


headache





Referring Physician


Referring Physician:


Dr. Carrington


PCP:  Dr. Graves





Source


Source:  Chart review, Patient





History of Present Illness


History of Present Illness


Please see Dr. Almendarez's consult dictated yesterday but filed under the last visit. 

Briefly, the patient is a 37-year-old right-handed female with a long history 

of intractable migraines. She has [facility]. At 

the last visit, the patient did have a lumbar puncture which was negative for 

increased pressure all stop the patient has been on a number of medications 

including Botox injections without relief. She has never been on intravenous 

DHE although she does use intranasal DHE at home with some effect. She has a 10/

10 headache. It is not positional, and in fact she is sitting up eating 

breakfast as I examine her. She does have some back pain and has undergone a 

lumbar MRI as reviewed below.





Current Medications


Current Medications





Current Medications


Acetaminophen (Tylenol) 650 mg PRN Q6HRS  PRN PO FEVER;  Start 9/19/17 at 17:15


Ondansetron HCl (Zofran) 4 mg PRN Q6HRS  PRN IV NAUSEA/VOMITING;  Start 9/19/17 

at 17:15


Morphine Sulfate 2 mg PRN Q2HR  PRN IV PAIN;  Start 9/19/17 at 17:15


Tramadol HCl (Ultram) 50 mg PRN Q6HRS  PRN PO PAIN;  Start 9/19/17 at 17:15


Hydralazine HCl (Apresoline) 10 mg PRN Q4HRS  PRN IVP ELEVATED BP, SEE COMMENTS

;  Start 9/19/17 at 17:15


Docusate Sodium (Colace) 100 mg PRN DAILY  PRN PO CONSTIPATION;  Start 9/19/17 

at 17:15


Hydromorphone HCl (Dilaudid) 2 mg PRN Q4HRS  PRN IV PAIN Last administered on 9/ 20/17at 06:10;  Start 9/19/17 at 17:15


Oxycodone/ Acetaminophen (Percocet 10/325) 1 tab PRN Q4HRS  PRN PO PAIN Last 

administered on 9/20/17at 02:37;  Start 9/19/17 at 17:15


Enoxaparin Sodium (Lovenox 40mg Syringe) 40 mg Q24H SQ  Last administered on 9/ 19/17at 19:01;  Start 9/19/17 at 18:00


Sumatriptan Succinate (Imitrex) 50 mg PRN Q2HR  PRN PO MIGRAINE HEADACHE Last 

administered on 9/20/17at 00:20;  Start 9/19/17 at 17:30


Clonazepam (KlonoPIN) 1 mg PRN TID  PRN PO ANXIETY Last administered on 9/20/ 17at 06:10;  Start 9/19/17 at 17:30


Diphenhydramine HCl (Benadryl) 50 mg PRN Q6HRS  PRN PO HEADACHE Last 

administered on 9/20/17at 00:20;  Start 9/19/17 at 17:30


Acetaminophen/ Hydrocodone Bitart (Lortab 5/325) 1 tab PRN Q4HRS  PRN PO PAIN;  

Start 9/19/17 at 17:30


Metoprolol Tartrate (Lopressor) 25 mg QHS PO  Last administered on 9/19/17at 21:

11;  Start 9/19/17 at 21:00


Tizanidine HCl (Zanaflex) 4 mg PRN QID  PRN PO MUSCLE SPASMS;  Start 9/19/17 at 

17:30


Non-Formulary Medication 1 ml PRN BID  PRN NS MIGRAINE HEADACHE;  Start 9/19/17 

at 17:30;  Status UNV


Dronabinol (Marinol) 5 mg TIDAC PO ;  Start 9/20/17 at 07:30


Non-Formulary Medication 1 tab DAILY PO ;  Start 9/20/17 at 09:00;  Status UNV


Ondansetron HCl (Zofran Odt) 8 mg PRN BID  PRN PO NAUSEA/VOMITING;  Start 9/19/ 17 at 17:45


Quetiapine Fumarate (SEROquel XR) 300 mg QHS PO  Last administered on 9/19/17at 

21:11;  Start 9/19/17 at 21:00


Ketorolac Tromethamine (Toradol) 15 mg PRN Q6HRS  PRN IV PAIN Last administered 

on 9/20/17at 02:37;  Start 9/19/17 at 17:30;  Stop 9/24/17 at 17:29


Diphenhydramine HCl (Benadryl) 25 mg PRN Q6HRS  PRN IVP ITCHING Last 

administered on 9/19/17at 18:58;  Start 9/19/17 at 17:30


Metoclopramide HCl (Reglan) 10 mg PRN Q6HRS  PRN IV NAUSEA/VOMITING Last 

administered on 9/20/17at 06:10;  Start 9/19/17 at 17:30


Gadobutrol (Gadavist) 7.5 mmol 1X  ONCE IV  Last administered on 9/19/17at 20:36

;  Start 9/19/17 at 20:15;  Stop 9/19/17 at 20:18;  Status DC





Active Scripts


Active


Reported


Seroquel (Quetiapine Fumarate) 100 Mg Tablet 150 Mg PO PRN QHS PRN


Metoprolol Tartrate 25 Mg Tablet 1 Tab PO QHS


Dihydroergotamine Mesylate 1 Ml Spray.pump 1 Ml NS PRN BID PRN


Hydrocodone-Apap 5-325  ** (Hydrocodone Bit/Acetaminophen) 1 Each Tablet 1 Tab 

PO PRN Q4HRS PRN


Loestrin (Norethindrone A-E Estradiol) 1 Each Tablet 1 Tab PO DAILY


Clonazepam 1 Mg Tablet 1 Mg PO PRN TID PRN


Tizanidine Hcl 4 Mg Tablet 4 Mg PO QID PRN


Seroquel (Quetiapine Fumarate) 300 Mg Tablet 300 Mg PO HS


Marinol (Dronabinol) 5 Mg Capsule 5 Mg PO PRN TID PRN


Nifedipine Er (Nifedipine) 30 Mg Tab.er.24 1 Tab PO HS


Benadryl (Diphenhydramine Hcl) 25 Mg Capsule 2 Cap PO PRN Q6-8HRS PRN


Zofran (Ondansetron Hcl) 8 Mg Tablet 8 Mg PO BID PRN





Allergies


Allergies:  


Coded Allergies:  


     benztropine (Verified  Allergy, Intermediate, SEVERE PAIN, 7/10/16)


     dexamethasone (Verified  Allergy, Intermediate, SEVERE PAIN, 7/10/16)





Physical Exam


Physical Examination


PHYSICAL EXAMINATION:  


Vital signs: see above.  


General appearance is normal and in no acute distress.  


HEENT:  Normocephalic and nontraumatic.  Eyes, nose, ears, and throat are 

unremarkable.  


Neck is supple. No lymphadenopathy. No bruits are heard over the carotid 

artery.  No crepitus. 


NEUROLOGICAL EXAMINATION:  


Mental Status Examination:  Alert. Oriented to time, place, and person. Answers 

questions and follows commends. Pupils are equal round and reactive to light 

and accommodation.  Extraocular movements are intact.   Visual field exam shows 

no defect on the direct confrontation.  No motor or sensory deficits on the 

facial exam.  Uvula in the midline and the soft palate elevated symmetrically.  

No deviation of the tongue to any direction.  Gross hearing is normal.  

Shoulder shrug normal.  Muscle tone is normal.  Muscle strength is 5.  Deep 

tendon reflexes are 2+ all around.  Plantar reflex is with flexion response 

bilaterally.  Finger-to-nose test performance is accurate.  Alternative 

movements are accurate.  Gait not tested.  Sensory exam shows no deficits. No 

cerebellar signs are elicited.





Vitals


VITALS





Vital Signs








  Date Time  Temp Pulse Resp B/P (MAP) Pulse Ox O2 Delivery O2 Flow Rate FiO2


 


9/20/17 07:15 98.2 64 16 140/68 (92) 97 Room Air  





 98.2       











Labs


Labs





Laboratory Tests








Test


  9/20/17


05:30 9/20/17


05:40


 


Sodium Level


  140 mmol/L


(136-145) 


 


 


Potassium Level


  3.9 mmol/L


(3.5-5.1) 


 


 


Chloride Level


  105 mmol/L


() 


 


 


Carbon Dioxide Level


  24 mmol/L


(21-32) 


 


 


Anion Gap 11 (6-14)  


 


Blood Urea Nitrogen


  21 mg/dL


(7-20) 


 


 


Creatinine


  0.8 mg/dL


(0.6-1.0) 


 


 


Estimated GFR


(Cockcroft-Gault) 80.7 


  


 


 


Glucose Level


  94 mg/dL


(70-99) 


 


 


Calcium Level


  8.8 mg/dL


(8.5-10.1) 


 


 


White Blood Count


  


  9.6 x10^3/uL


(4.0-11.0)


 


Red Blood Count


  


  4.39 x10^6/uL


(3.50-5.40)


 


Hemoglobin


  


  13.2 g/dL


(12.0-15.5)


 


Hematocrit


  


  37.0 %


(36.0-47.0)


 


Mean Corpuscular Volume  84 fL () 


 


Mean Corpuscular Hemoglobin  30 pg (25-35) 


 


Mean Corpuscular Hemoglobin


Concent 


  36 g/dL


(31-37)


 


Red Cell Distribution Width


  


  13.0 %


(11.5-14.5)


 


Platelet Count


  


  248 x10^3/uL


(140-400)


 


Neutrophils (%) (Auto)  55 % (31-73) 


 


Lymphocytes (%) (Auto)  34 % (24-48) 


 


Monocytes (%) (Auto)  9 % (0-9) 


 


Eosinophils (%) (Auto)  2 % (0-3) 


 


Basophils (%) (Auto)  0 % (0-3) 


 


Neutrophils # (Auto)


  


  5.2 x10^3uL


(1.8-7.7)


 


Lymphocytes # (Auto)


  


  3.3 x10^3/uL


(1.0-4.8)


 


Monocytes # (Auto)


  


  0.9 x10^3/uL


(0.0-1.1)


 


Eosinophils # (Auto)


  


  0.1 x10^3/uL


(0.0-0.7)


 


Basophils # (Auto)


  


  0.0 x10^3/uL


(0.0-0.2)








Laboratory Tests








Test


  9/20/17


05:30 9/20/17


05:40


 


Sodium Level


  140 mmol/L


(136-145) 


 


 


Potassium Level


  3.9 mmol/L


(3.5-5.1) 


 


 


Chloride Level


  105 mmol/L


() 


 


 


Carbon Dioxide Level


  24 mmol/L


(21-32) 


 


 


Anion Gap 11 (6-14)  


 


Blood Urea Nitrogen


  21 mg/dL


(7-20) 


 


 


Creatinine


  0.8 mg/dL


(0.6-1.0) 


 


 


Estimated GFR


(Cockcroft-Gault) 80.7 


  


 


 


Glucose Level


  94 mg/dL


(70-99) 


 


 


Calcium Level


  8.8 mg/dL


(8.5-10.1) 


 


 


White Blood Count


  


  9.6 x10^3/uL


(4.0-11.0)


 


Red Blood Count


  


  4.39 x10^6/uL


(3.50-5.40)


 


Hemoglobin


  


  13.2 g/dL


(12.0-15.5)


 


Hematocrit


  


  37.0 %


(36.0-47.0)


 


Mean Corpuscular Volume  84 fL () 


 


Mean Corpuscular Hemoglobin  30 pg (25-35) 


 


Mean Corpuscular Hemoglobin


Concent 


  36 g/dL


(31-37)


 


Red Cell Distribution Width


  


  13.0 %


(11.5-14.5)


 


Platelet Count


  


  248 x10^3/uL


(140-400)


 


Neutrophils (%) (Auto)  55 % (31-73) 


 


Lymphocytes (%) (Auto)  34 % (24-48) 


 


Monocytes (%) (Auto)  9 % (0-9) 


 


Eosinophils (%) (Auto)  2 % (0-3) 


 


Basophils (%) (Auto)  0 % (0-3) 


 


Neutrophils # (Auto)


  


  5.2 x10^3uL


(1.8-7.7)


 


Lymphocytes # (Auto)


  


  3.3 x10^3/uL


(1.0-4.8)


 


Monocytes # (Auto)


  


  0.9 x10^3/uL


(0.0-1.1)


 


Eosinophils # (Auto)


  


  0.1 x10^3/uL


(0.0-0.7)


 


Basophils # (Auto)


  


  0.0 x10^3/uL


(0.0-0.2)











Images


Images


MRI lumbar:


umbar vertebral body height and alignment intact. No bone 


marrow edema. Conus medullaris terminates at the upper L1 vertebral level.


No tethering or mass of the cauda equina. No epidural fluid collections. 


No paraspinal fluid collections. There may be mild paraspinous edema 


adjacent of the L3 spinous process perhaps near the region of lumbar 


puncture however there is no fluid collection. No collapse of the thecal 


sac. No pathologic intradural enhancement. Degenerative changes are 


described below.


 


L1-L2: Unremarkable.


 


L2-L3: Unremarkable.


 


L3-L4: Mild facet hypertrophy. No spinal canal or neural foraminal 


stenosis.


 


L4-L5: Disc desiccation, mild disc bulge and areas of enhancing annulus 


tear laterally, mild facet osteophytes, mild neural foraminal stenoses. No


spinal canal stenosis.


 


L5-S1: Disc desiccation, left paracentral and lateral shallow broad-based 


disc protrusion with enhancing annulus tear, no spinal canal or neural 


foraminal stenosis.


 


IMPRESSION: Lower lumbar spine disc disease as described above. No spinal 


canal stenosis. Mild neural foraminal stenoses L4-5. No epidural or 


paraspinous fluid collection or collapse of the dural sac or dural 


enhancement to suggest spinal leak. See discussion above.





Assessment/Plan


Assessment/Plan


Impression:


Intractable migraine headache, status migrainosus





Recommendations:


I discussed risks, benefits, alternatives, side effects, and will start 

intravenous DHE I usually give IV steroids with the DHE but the patient is 

allergic to Decadron.


I asked the patient to avoid the narcotics as they can interfere with the DHE 

effect.


Patient does not need any additional testing.





Thank you for letting me help the patient's care.











JUAN C BILLS MD Sep 20, 2017 08:52

## 2017-09-20 NOTE — PDOC
PROGRESS NOTES


Chief Complaint


Chief Complaint


  Intractable  headache 2/2 migraine flare 


Chronic migraine headache w. acute flate, 


HTN


OA


obese, BMI 33





History of Present Illness


History of Present Illness


 neuro consult fllowing,   DHE infusion started


still having pain


Dr. Bojorquez following in hospital, I discussed outpateint f/u with Dr. Almendarez


 


supportive care for pain control, and nausea control





Vitals


Vitals





Vital Signs








  Date Time  Temp Pulse Resp B/P (MAP) Pulse Ox O2 Delivery O2 Flow Rate FiO2


 


9/20/17 13:44     95 Room Air  


 


9/20/17 11:20 98.3 62 18 115/59 (77)    





 98.3       











Physical Exam


General:  Alert, Cooperative, mild distress (pain)


Heart:  Regular rate


Lungs:  Clear, Other


Extremities:  No clubbing, No cyanosis





Labs


LABS





Laboratory Tests








Test


  9/20/17


05:30 9/20/17


05:40


 


Sodium Level


  140 mmol/L


(136-145) 


 


 


Potassium Level


  3.9 mmol/L


(3.5-5.1) 


 


 


Chloride Level


  105 mmol/L


() 


 


 


Carbon Dioxide Level


  24 mmol/L


(21-32) 


 


 


Anion Gap 11 (6-14)  


 


Blood Urea Nitrogen


  21 mg/dL


(7-20) 


 


 


Creatinine


  0.8 mg/dL


(0.6-1.0) 


 


 


Estimated GFR


(Cockcroft-Gault) 80.7 


  


 


 


Glucose Level


  94 mg/dL


(70-99) 


 


 


Calcium Level


  8.8 mg/dL


(8.5-10.1) 


 


 


White Blood Count


  


  9.6 x10^3/uL


(4.0-11.0)


 


Red Blood Count


  


  4.39 x10^6/uL


(3.50-5.40)


 


Hemoglobin


  


  13.2 g/dL


(12.0-15.5)


 


Hematocrit


  


  37.0 %


(36.0-47.0)


 


Mean Corpuscular Volume  84 fL () 


 


Mean Corpuscular Hemoglobin  30 pg (25-35) 


 


Mean Corpuscular Hemoglobin


Concent 


  36 g/dL


(31-37)


 


Red Cell Distribution Width


  


  13.0 %


(11.5-14.5)


 


Platelet Count


  


  248 x10^3/uL


(140-400)


 


Neutrophils (%) (Auto)  55 % (31-73) 


 


Lymphocytes (%) (Auto)  34 % (24-48) 


 


Monocytes (%) (Auto)  9 % (0-9) 


 


Eosinophils (%) (Auto)  2 % (0-3) 


 


Basophils (%) (Auto)  0 % (0-3) 


 


Neutrophils # (Auto)


  


  5.2 x10^3uL


(1.8-7.7)


 


Lymphocytes # (Auto)


  


  3.3 x10^3/uL


(1.0-4.8)


 


Monocytes # (Auto)


  


  0.9 x10^3/uL


(0.0-1.1)


 


Eosinophils # (Auto)


  


  0.1 x10^3/uL


(0.0-0.7)


 


Basophils # (Auto)


  


  0.0 x10^3/uL


(0.0-0.2)











Comment


Review of Relevant


I have reviewed the following items amber (where applicable) has been applied.


Labs





Laboratory Tests








Test


  9/20/17


05:30 9/20/17


05:40


 


Sodium Level


  140 mmol/L


(136-145) 


 


 


Potassium Level


  3.9 mmol/L


(3.5-5.1) 


 


 


Chloride Level


  105 mmol/L


() 


 


 


Carbon Dioxide Level


  24 mmol/L


(21-32) 


 


 


Anion Gap 11 (6-14)  


 


Blood Urea Nitrogen


  21 mg/dL


(7-20) 


 


 


Creatinine


  0.8 mg/dL


(0.6-1.0) 


 


 


Estimated GFR


(Cockcroft-Gault) 80.7 


  


 


 


Glucose Level


  94 mg/dL


(70-99) 


 


 


Calcium Level


  8.8 mg/dL


(8.5-10.1) 


 


 


White Blood Count


  


  9.6 x10^3/uL


(4.0-11.0)


 


Red Blood Count


  


  4.39 x10^6/uL


(3.50-5.40)


 


Hemoglobin


  


  13.2 g/dL


(12.0-15.5)


 


Hematocrit


  


  37.0 %


(36.0-47.0)


 


Mean Corpuscular Volume  84 fL () 


 


Mean Corpuscular Hemoglobin  30 pg (25-35) 


 


Mean Corpuscular Hemoglobin


Concent 


  36 g/dL


(31-37)


 


Red Cell Distribution Width


  


  13.0 %


(11.5-14.5)


 


Platelet Count


  


  248 x10^3/uL


(140-400)


 


Neutrophils (%) (Auto)  55 % (31-73) 


 


Lymphocytes (%) (Auto)  34 % (24-48) 


 


Monocytes (%) (Auto)  9 % (0-9) 


 


Eosinophils (%) (Auto)  2 % (0-3) 


 


Basophils (%) (Auto)  0 % (0-3) 


 


Neutrophils # (Auto)


  


  5.2 x10^3uL


(1.8-7.7)


 


Lymphocytes # (Auto)


  


  3.3 x10^3/uL


(1.0-4.8)


 


Monocytes # (Auto)


  


  0.9 x10^3/uL


(0.0-1.1)


 


Eosinophils # (Auto)


  


  0.1 x10^3/uL


(0.0-0.7)


 


Basophils # (Auto)


  


  0.0 x10^3/uL


(0.0-0.2)








Laboratory Tests








Test


  9/20/17


05:30 9/20/17


05:40


 


Sodium Level


  140 mmol/L


(136-145) 


 


 


Potassium Level


  3.9 mmol/L


(3.5-5.1) 


 


 


Chloride Level


  105 mmol/L


() 


 


 


Carbon Dioxide Level


  24 mmol/L


(21-32) 


 


 


Anion Gap 11 (6-14)  


 


Blood Urea Nitrogen


  21 mg/dL


(7-20) 


 


 


Creatinine


  0.8 mg/dL


(0.6-1.0) 


 


 


Estimated GFR


(Cockcroft-Gault) 80.7 


  


 


 


Glucose Level


  94 mg/dL


(70-99) 


 


 


Calcium Level


  8.8 mg/dL


(8.5-10.1) 


 


 


White Blood Count


  


  9.6 x10^3/uL


(4.0-11.0)


 


Red Blood Count


  


  4.39 x10^6/uL


(3.50-5.40)


 


Hemoglobin


  


  13.2 g/dL


(12.0-15.5)


 


Hematocrit


  


  37.0 %


(36.0-47.0)


 


Mean Corpuscular Volume  84 fL () 


 


Mean Corpuscular Hemoglobin  30 pg (25-35) 


 


Mean Corpuscular Hemoglobin


Concent 


  36 g/dL


(31-37)


 


Red Cell Distribution Width


  


  13.0 %


(11.5-14.5)


 


Platelet Count


  


  248 x10^3/uL


(140-400)


 


Neutrophils (%) (Auto)  55 % (31-73) 


 


Lymphocytes (%) (Auto)  34 % (24-48) 


 


Monocytes (%) (Auto)  9 % (0-9) 


 


Eosinophils (%) (Auto)  2 % (0-3) 


 


Basophils (%) (Auto)  0 % (0-3) 


 


Neutrophils # (Auto)


  


  5.2 x10^3uL


(1.8-7.7)


 


Lymphocytes # (Auto)


  


  3.3 x10^3/uL


(1.0-4.8)


 


Monocytes # (Auto)


  


  0.9 x10^3/uL


(0.0-1.1)


 


Eosinophils # (Auto)


  


  0.1 x10^3/uL


(0.0-0.7)


 


Basophils # (Auto)


  


  0.0 x10^3/uL


(0.0-0.2)








Medications





Current Medications


Acetaminophen (Tylenol) 650 mg PRN Q6HRS  PRN PO FEVER;  Start 9/19/17 at 17:15


Ondansetron HCl (Zofran) 4 mg PRN Q6HRS  PRN IV NAUSEA/VOMITING;  Start 9/19/17 

at 17:15


Morphine Sulfate 2 mg PRN Q2HR  PRN IV PAIN;  Start 9/19/17 at 17:15


Tramadol HCl (Ultram) 50 mg PRN Q6HRS  PRN PO PAIN;  Start 9/19/17 at 17:15


Hydralazine HCl (Apresoline) 10 mg PRN Q4HRS  PRN IVP ELEVATED BP, SEE COMMENTS

;  Start 9/19/17 at 17:15


Docusate Sodium (Colace) 100 mg PRN DAILY  PRN PO CONSTIPATION;  Start 9/19/17 

at 17:15


Hydromorphone HCl (Dilaudid) 2 mg PRN Q4HRS  PRN IV PAIN Last administered on 9/ 20/17at 13:07;  Start 9/19/17 at 17:15


Oxycodone/ Acetaminophen (Percocet 10/325) 1 tab PRN Q4HRS  PRN PO PAIN Last 

administered on 9/20/17at 02:37;  Start 9/19/17 at 17:15


Enoxaparin Sodium (Lovenox 40mg Syringe) 40 mg Q24H SQ  Last administered on 9/ 19/17at 19:01;  Start 9/19/17 at 18:00


Sumatriptan Succinate (Imitrex) 50 mg PRN Q2HR  PRN PO MIGRAINE HEADACHE Last 

administered on 9/20/17at 00:20;  Start 9/19/17 at 17:30;  Stop 9/20/17 at 08:46

;  Status DC


Clonazepam (KlonoPIN) 1 mg PRN TID  PRN PO ANXIETY Last administered on 9/20/ 17at 13:06;  Start 9/19/17 at 17:30


Diphenhydramine HCl (Benadryl) 50 mg PRN Q6HRS  PRN PO HEADACHE Last 

administered on 9/20/17at 00:20;  Start 9/19/17 at 17:30


Acetaminophen/ Hydrocodone Bitart (Lortab 5/325) 1 tab PRN Q4HRS  PRN PO PAIN;  

Start 9/19/17 at 17:30


Metoprolol Tartrate (Lopressor) 25 mg QHS PO  Last administered on 9/19/17at 21:

11;  Start 9/19/17 at 21:00


Tizanidine HCl (Zanaflex) 4 mg PRN QID  PRN PO MUSCLE SPASMS;  Start 9/19/17 at 

17:30


Non-Formulary Medication 1 ml PRN BID  PRN NS MIGRAINE HEADACHE;  Start 9/19/17 

at 17:30;  Stop 9/20/17 at 08:46;  Status DC


Dronabinol (Marinol) 5 mg TIDAC PO  Last administered on 9/20/17at 13:06;  

Start 9/20/17 at 07:30


Non-Formulary Medication 1 tab DAILY PO ;  Start 9/21/17 at 09:00


Ondansetron HCl (Zofran Odt) 8 mg PRN BID  PRN PO NAUSEA/VOMITING;  Start 9/19/ 17 at 17:45


Quetiapine Fumarate (SEROquel XR) 300 mg QHS PO  Last administered on 9/19/17at 

21:11;  Start 9/19/17 at 21:00


Ketorolac Tromethamine (Toradol) 15 mg PRN Q6HRS  PRN IV PAIN Last administered 

on 9/20/17at 02:37;  Start 9/19/17 at 17:30;  Stop 9/20/17 at 08:46;  Status DC


Diphenhydramine HCl (Benadryl) 25 mg PRN Q6HRS  PRN IVP ITCHING Last 

administered on 9/19/17at 18:58;  Start 9/19/17 at 17:30


Metoclopramide HCl (Reglan) 10 mg PRN Q6HRS  PRN IV NAUSEA/VOMITING Last 

administered on 9/20/17at 06:10;  Start 9/19/17 at 17:30;  Stop 9/20/17 at 08:46

;  Status DC


Gadobutrol (Gadavist) 7.5 mmol 1X  ONCE IV  Last administered on 9/19/17at 20:36

;  Start 9/19/17 at 20:15;  Stop 9/19/17 at 20:18;  Status DC


Metoclopramide HCl (Reglan) 10 mg Q8HRS IV  Last administered on 9/20/17at 10:30

;  Start 9/20/17 at 08:45


Dihydroergotamine Mesylate 1 mg/ Sodium Chloride 51 ml @  102 mls/hr Q8HRS IV  

Last administered on 9/20/17at 10:30;  Start 9/20/17 at 09:00;  Stop 9/22/17 at 

22:29


Influenza Virus Vaccine Quadrival (Fluarix Quad [id number] Syringe) 0.5 ml ONCE 

ONCE VAX IM ;  Start 9/20/17 at 13:45;  Stop 9/20/17 at 13:46;  Status DC





Active Scripts


Active


Reported


Seroquel (Quetiapine Fumarate) 100 Mg Tablet 150 Mg PO PRN QHS PRN


Metoprolol Tartrate 25 Mg Tablet 1 Tab PO QHS


Dihydroergotamine Mesylate 1 Ml Spray.pump 1 Ml NS PRN BID PRN


Hydrocodone-Apap 5-325  ** (Hydrocodone Bit/Acetaminophen) 1 Each Tablet 1 Tab 

PO PRN Q4HRS PRN


Loestrin (Norethindrone A-E Estradiol) 1 Each Tablet 1 Tab PO DAILY


Clonazepam 1 Mg Tablet 1 Mg PO PRN TID PRN


Tizanidine Hcl 4 Mg Tablet 4 Mg PO QID PRN


Seroquel (Quetiapine Fumarate) 300 Mg Tablet 300 Mg PO HS


Marinol (Dronabinol) 5 Mg Capsule 5 Mg PO PRN TID PRN


Nifedipine Er (Nifedipine) 30 Mg Tab.er.24 1 Tab PO HS


Benadryl (Diphenhydramine Hcl) 25 Mg Capsule 2 Cap PO PRN Q6-8HRS PRN


Zofran (Ondansetron Hcl) 8 Mg Tablet 8 Mg PO BID PRN


Vitals/I & O





Vital Sign - Last 24 Hours








 9/19/17 9/19/17 9/19/17 9/19/17





 16:38 19:00 19:47 20:00


 


Temp 97.8  98.4 





 97.8  98.4 


 


Pulse 78  84 


 


Resp 18 18 18 


 


B/P (MAP) 149/84 (105)  151/82 (105) 


 


Pulse Ox 97 97 96 


 


O2 Delivery Room Air Room Air Room Air Room Air


 


    





    





 9/19/17 9/19/17 9/20/17 9/20/17





 21:11 23:22 03:27 07:15


 


Temp  98.0  98.2





  98.0  98.2


 


Pulse 84 68 65 64


 


Resp  20 18 16


 


B/P (MAP) 151/82 147/78 (101) 131/81 (98) 140/68 (92)


 


Pulse Ox  97 95 97


 


O2 Delivery  Room Air Room Air Room Air


 


    





    





 9/20/17 9/20/17 9/20/17 9/20/17





 07:45 11:20 13:07 13:44


 


Temp  98.3  





  98.3  


 


Pulse  62  


 


Resp  18  


 


B/P (MAP)  115/59 (77)  


 


Pulse Ox  95 95 95


 


O2 Delivery Room Air Room Air Room Air Room Air














Intake and Output   


 


 9/20/17 9/20/17 9/21/17





 15:00 23:00 07:00


 


Intake Total 480 ml  


 


Balance 480 ml  

















JAGDEEP WEAVER MD Sep 20, 2017 15:08

## 2017-09-21 NOTE — PDOC
PROGRESS NOTES


Chief Complaint


Chief Complaint


  Intractable  headache 2/2 migraine flare 


Chronic migraine headache w. acute flate, 


HTN


OA


obese, BMI 33


coagulopathy NOS





History of Present Illness


History of Present Illness


 neuro consult fllowing,   DHE infusion ongoing


she complains of severe pain, but looks a little improved,  can smile,   is 

walkign to the bathroom,


has slept some





She has marked right arm pain at PICC line,   on lovenox, she reports some 

coagulopathy,  NOS,  she could not recall if protein c or s def.  she was sure 

it was not Factor V


consult Heme, 


check US UE at PICC sight,  swollen and painful


cont DHE, limit narcotics per Neuro recs





Vitals


Vitals





Vital Signs








  Date Time  Temp Pulse Resp B/P (MAP) Pulse Ox O2 Delivery O2 Flow Rate FiO2


 


9/21/17 20:00      Room Air  


 


9/21/17 19:49 98.5 94 18 139/81 (100) 98   





 98.5       











Physical Exam


General:  Alert, Cooperative, mild distress (pain)


Heart:  Regular rate


Lungs:  Clear, Other


Extremities:  No clubbing, No cyanosis





Review of Systems


Review of Systems


headache, nausea


insomnia,





Comment


Review of Relevant


I have reviewed the following items amber (where applicable) has been applied.


Labs





Laboratory Tests








Test


  9/20/17


05:30 9/20/17


05:40


 


Sodium Level


  140 mmol/L


(136-145) 


 


 


Potassium Level


  3.9 mmol/L


(3.5-5.1) 


 


 


Chloride Level


  105 mmol/L


() 


 


 


Carbon Dioxide Level


  24 mmol/L


(21-32) 


 


 


Anion Gap 11 (6-14)  


 


Blood Urea Nitrogen


  21 mg/dL


(7-20) 


 


 


Creatinine


  0.8 mg/dL


(0.6-1.0) 


 


 


Estimated GFR


(Cockcroft-Gault) 80.7 


  


 


 


Glucose Level


  94 mg/dL


(70-99) 


 


 


Calcium Level


  8.8 mg/dL


(8.5-10.1) 


 


 


White Blood Count


  


  9.6 x10^3/uL


(4.0-11.0)


 


Red Blood Count


  


  4.39 x10^6/uL


(3.50-5.40)


 


Hemoglobin


  


  13.2 g/dL


(12.0-15.5)


 


Hematocrit


  


  37.0 %


(36.0-47.0)


 


Mean Corpuscular Volume  84 fL () 


 


Mean Corpuscular Hemoglobin  30 pg (25-35) 


 


Mean Corpuscular Hemoglobin


Concent 


  36 g/dL


(31-37)


 


Red Cell Distribution Width


  


  13.0 %


(11.5-14.5)


 


Platelet Count


  


  248 x10^3/uL


(140-400)


 


Neutrophils (%) (Auto)  55 % (31-73) 


 


Lymphocytes (%) (Auto)  34 % (24-48) 


 


Monocytes (%) (Auto)  9 % (0-9) 


 


Eosinophils (%) (Auto)  2 % (0-3) 


 


Basophils (%) (Auto)  0 % (0-3) 


 


Neutrophils # (Auto)


  


  5.2 x10^3uL


(1.8-7.7)


 


Lymphocytes # (Auto)


  


  3.3 x10^3/uL


(1.0-4.8)


 


Monocytes # (Auto)


  


  0.9 x10^3/uL


(0.0-1.1)


 


Eosinophils # (Auto)


  


  0.1 x10^3/uL


(0.0-0.7)


 


Basophils # (Auto)


  


  0.0 x10^3/uL


(0.0-0.2)








Medications





Current Medications


Acetaminophen (Tylenol) 650 mg PRN Q6HRS  PRN PO FEVER;  Start 9/19/17 at 17:15


Ondansetron HCl (Zofran) 4 mg PRN Q6HRS  PRN IV NAUSEA/VOMITING;  Start 9/19/17 

at 17:15


Morphine Sulfate 2 mg PRN Q2HR  PRN IV PAIN;  Start 9/19/17 at 17:15


Tramadol HCl (Ultram) 50 mg PRN Q6HRS  PRN PO PAIN;  Start 9/19/17 at 17:15


Hydralazine HCl (Apresoline) 10 mg PRN Q4HRS  PRN IVP ELEVATED BP, SEE COMMENTS

;  Start 9/19/17 at 17:15


Docusate Sodium (Colace) 100 mg PRN DAILY  PRN PO CONSTIPATION Last 

administered on 9/21/17at 04:46;  Start 9/19/17 at 17:15


Hydromorphone HCl (Dilaudid) 2 mg PRN Q4HRS  PRN IV PAIN Last administered on 9/ 21/17at 12:14;  Start 9/19/17 at 17:15


Oxycodone/ Acetaminophen (Percocet 10/325) 1 tab PRN Q4HRS  PRN PO PAIN Last 

administered on 9/21/17at 04:46;  Start 9/19/17 at 17:15


Enoxaparin Sodium (Lovenox 40mg Syringe) 40 mg Q24H SQ  Last administered on 9/ 21/17at 17:41;  Start 9/19/17 at 18:00


Sumatriptan Succinate (Imitrex) 50 mg PRN Q2HR  PRN PO MIGRAINE HEADACHE Last 

administered on 9/20/17at 00:20;  Start 9/19/17 at 17:30;  Stop 9/20/17 at 08:46

;  Status DC


Clonazepam (KlonoPIN) 1 mg PRN TID  PRN PO ANXIETY Last administered on 9/21/ 17at 12:13;  Start 9/19/17 at 17:30


Diphenhydramine HCl (Benadryl) 50 mg PRN Q6HRS  PRN PO HEADACHE Last 

administered on 9/20/17at 00:20;  Start 9/19/17 at 17:30


Acetaminophen/ Hydrocodone Bitart (Lortab 5/325) 1 tab PRN Q4HRS  PRN PO PAIN 

Last administered on 9/21/17at 08:03;  Start 9/19/17 at 17:30


Metoprolol Tartrate (Lopressor) 25 mg QHS PO  Last administered on 9/20/17at 22:

22;  Start 9/19/17 at 21:00


Tizanidine HCl (Zanaflex) 4 mg PRN QID  PRN PO MUSCLE SPASMS;  Start 9/19/17 at 

17:30


Non-Formulary Medication 1 ml PRN BID  PRN NS MIGRAINE HEADACHE;  Start 9/19/17 

at 17:30;  Stop 9/20/17 at 08:46;  Status DC


Dronabinol (Marinol) 5 mg TIDAC PO  Last administered on 9/21/17at 17:41;  

Start 9/20/17 at 07:30


Non-Formulary Medication 1 tab DAILY PO  Last administered on 9/21/17at 08:04;  

Start 9/21/17 at 09:00


Ondansetron HCl (Zofran Odt) 8 mg PRN BID  PRN PO NAUSEA/VOMITING;  Start 9/19/ 17 at 17:45


Quetiapine Fumarate (SEROquel XR) 300 mg QHS PO  Last administered on 9/20/17at 

22:20;  Start 9/19/17 at 21:00


Ketorolac Tromethamine (Toradol) 15 mg PRN Q6HRS  PRN IV PAIN Last administered 

on 9/20/17at 02:37;  Start 9/19/17 at 17:30;  Stop 9/20/17 at 08:46;  Status DC


Diphenhydramine HCl (Benadryl) 25 mg PRN Q6HRS  PRN IVP ITCHING Last 

administered on 9/19/17at 18:58;  Start 9/19/17 at 17:30


Metoclopramide HCl (Reglan) 10 mg PRN Q6HRS  PRN IV NAUSEA/VOMITING Last 

administered on 9/20/17at 06:10;  Start 9/19/17 at 17:30;  Stop 9/20/17 at 08:46

;  Status DC


Gadobutrol (Gadavist) 7.5 mmol 1X  ONCE IV  Last administered on 9/19/17at 20:36

;  Start 9/19/17 at 20:15;  Stop 9/19/17 at 20:18;  Status DC


Metoclopramide HCl (Reglan) 10 mg Q8HRS IV  Last administered on 9/21/17at 14:15

;  Start 9/20/17 at 08:45


Dihydroergotamine Mesylate 1 mg/ Sodium Chloride 51 ml @  102 mls/hr Q8HRS IV  

Last administered on 9/21/17at 14:15;  Start 9/20/17 at 09:00;  Stop 9/22/17 at 

22:29


Influenza Virus Vaccine Quadrival (Fluarix Quad 3187-8120 Syringe) 0.5 ml ONCE 

ONCE VAX IM  Last administered on 9/20/17at 13:45;  Start 9/20/17 at 13:45;  

Stop 9/20/17 at 13:46;  Status DC


Quetiapine Fumarate (SEROquel) 150 mg PRN QHS  PRN PO ANXIETY Last administered 

on 9/20/17at 23:35;  Start 9/20/17 at 22:15


Non-Formulary Medication 1 ea PRN DAILY  PRN PO SEE PACKAGE DIRECTIONS;  Start 9 /21/17 at 07:00;  Stop 9/22/17 at 13:00


Lidocaine/Sodium Bicarbonate (Buffered Lidocaine 1%) 20 ml STK-MED ONCE IJ ;  

Start 9/21/17 at 09:08;  Stop 9/21/17 at 09:09;  Status DC


Heparin Sodium/ Sodium Chloride 500 ml @  As Directed STK-MED ONCE .ROUTE ;  

Start 9/21/17 at 09:08;  Stop 9/21/17 at 09:09;  Status DC


Heparin Sodium/ Sodium Chloride 1,000 unit 1X  ONCE IART  Last administered on 9 /21/17at 09:59;  Start 9/21/17 at 09:15;  Stop 9/21/17 at 09:16;  Status DC


Lidocaine/Sodium Bicarbonate (Buffered Lidocaine 1%) 20 ml 1X  ONCE IJ  Last 

administered on 9/21/17at 09:15;  Start 9/21/17 at 09:15;  Stop 9/21/17 at 09:16

;  Status DC





Active Scripts


Active


Reported


Seroquel (Quetiapine Fumarate) 100 Mg Tablet 150 Mg PO PRN QHS PRN


Metoprolol Tartrate 25 Mg Tablet 1 Tab PO QHS


Dihydroergotamine Mesylate 1 Ml Spray.pump 1 Ml NS PRN BID PRN


Hydrocodone-Apap 5-325  ** (Hydrocodone Bit/Acetaminophen) 1 Each Tablet 1 Tab 

PO PRN Q4HRS PRN


Loestrin (Norethindrone A-E Estradiol) 1 Each Tablet 1 Tab PO DAILY


Clonazepam 1 Mg Tablet 1 Mg PO PRN TID PRN


Tizanidine Hcl 4 Mg Tablet 4 Mg PO QID PRN


Seroquel (Quetiapine Fumarate) 300 Mg Tablet 300 Mg PO HS


Marinol (Dronabinol) 5 Mg Capsule 5 Mg PO PRN TID PRN


Nifedipine Er (Nifedipine) 30 Mg Tab.er.24 1 Tab PO HS


Benadryl (Diphenhydramine Hcl) 25 Mg Capsule 2 Cap PO PRN Q6-8HRS PRN


Zofran (Ondansetron Hcl) 8 Mg Tablet 8 Mg PO BID PRN


Vitals/I & O





Vital Sign - Last 24 Hours








 9/20/17 9/20/17 9/21/17 9/21/17





 22:22 23:04 03:18 07:15


 


Temp  97.9  98.5





  97.9  98.5


 


Pulse 80 80  80


 


Resp  18  18


 


B/P (MAP) 153/92 153/92 (112)  122/76 (91)


 


Pulse Ox  96  96


 


O2 Delivery  Room Air Room Air Room Air


 


    





    





 9/21/17 9/21/17 9/21/17 9/21/17





 08:00 08:03 11:13 12:14


 


Temp   98.0 





   98.0 


 


Pulse   81 


 


Resp   16 


 


B/P (MAP)   125/70 (88) 


 


Pulse Ox  96 96 96


 


O2 Delivery Room Air Room Air Room Air Room Air


 


    





    





 9/21/17 9/21/17 9/21/17 9/21/17





 12:18 14:15 15:09 19:49


 


Temp   98.6 98.5





   98.6 98.5


 


Pulse   68 94


 


Resp   18 18


 


B/P (MAP)   153/81 (105) 139/81 (100)


 


Pulse Ox 96 96 98 98


 


O2 Delivery Room Air Room Air Room Air Room Air


 


    





    





 9/21/17   





 20:00   


 


O2 Delivery Room Air   














Intake and Output   


 


 9/21/17 9/21/17 9/22/17





 15:00 23:00 07:00


 


Intake Total 480 ml 640 ml 


 


Output Total  600 ml 


 


Balance 480 ml 40 ml 

















JAGDEEP WEAVER MD Sep 21, 2017 21:03

## 2017-09-21 NOTE — RAD
Procedure: Upper extremity PICC line placement



Clinical Indication: 37-year-old requiring central venous access



Sedation: Local anesthesia only was provided



Antibiotics: None



Fluoro Time: 0.1 minutes. Images: 1



Contrast: None



Sterility: All elements of maximal sterile barrier technique including the use

of a cap, mask, sterile gown, sterile gloves, large sterile sheet, appropriate

hand hygiene, and 2% chlorhexidine for cutaneous antisepsis (or acceptable

alternative antiseptic per current guidelines) were followed for this

procedure.



Consent: The procedure was explained in its entirety to the patient or the

patients designated representative by a member of the treatment team,

including a discussion of the risks, benefits and commonly accepted

alternatives to the procedure, as well as the expected consequences of no

therapy whatsoever.   Discussion of the risks included, but was not limited

to, those that are most frequent and those that are rare but possibly severe

or life-threatening, as well as the possibility of unforeseen complications.



Technique and Findings: Following informed consent, the patient was prepped

and draped in the usual sterile fashion.  Ultrasound interrogation of the

right arm revealed patency and compressibility of the right brachial vein.  A

hard copy ultrasound image was recorded.  1% Lidocaine was used to achieve

local anesthesia and a 21-gauge micropuncture needle was used to gain access

to the targeted vein.  The needle was exchanged over wire for a 5 Portuguese

peel-away sheath which was used to deploy a PICC line under fluoroscopic

guidance such that the distal tip resided at the cavoatrial junction.  The

catheter flushed and aspirated with ease and was sutured to the skin.  



Complications: No immediate



Impression:

1. Ultrasound guided PICC line placement as described.

## 2017-09-21 NOTE — PDOC
PROGRESS NOTES


Assessment


Intractable migraine headache, status migrainosus


Plan


She has had only one dose of DHE due to poor IV access


 I usually give IV steroids with the DHE but the patient is allergic to 

Decadron.


I asked the patient to avoid the narcotics as they can interfere with the DHE 

effect.


Patient does not need any additional testing.


Subjective


No change in headache


Objective





Vital Signs








  Date Time  Temp Pulse Resp B/P (MAP) Pulse Ox O2 Delivery O2 Flow Rate FiO2


 


9/21/17 15:09 98.6 68 18 153/81 (105) 98 Room Air  





 98.6       














Intake and Output 


 


 9/22/17





 07:00


 


Intake Total 480 ml


 


Balance 480 ml


 


 


 


Intake Oral 480 ml








PHYSICAL EXAM


Still has severe headache, appears in distress, has eye shades on 


Alert. Oriented to time, place and person.


PERRL.


EOMI.


CN: no focal findings.


Muscle tone: normal.


Muscle strength: 5/5


DTR: 2+


Plantar reflex: Flexor


Gait: not examined in bed.


Sensory exam: no abnormal findings.


No cerebellar signs elicited.


Review of Relevant


I have reviewed the following items amber (where applicable) has been applied.


Labs





Laboratory Tests








Test


  9/20/17


05:30 9/20/17


05:40


 


Sodium Level


  140 mmol/L


(136-145) 


 


 


Potassium Level


  3.9 mmol/L


(3.5-5.1) 


 


 


Chloride Level


  105 mmol/L


() 


 


 


Carbon Dioxide Level


  24 mmol/L


(21-32) 


 


 


Anion Gap 11 (6-14)  


 


Blood Urea Nitrogen


  21 mg/dL


(7-20) 


 


 


Creatinine


  0.8 mg/dL


(0.6-1.0) 


 


 


Estimated GFR


(Cockcroft-Gault) 80.7 


  


 


 


Glucose Level


  94 mg/dL


(70-99) 


 


 


Calcium Level


  8.8 mg/dL


(8.5-10.1) 


 


 


White Blood Count


  


  9.6 x10^3/uL


(4.0-11.0)


 


Red Blood Count


  


  4.39 x10^6/uL


(3.50-5.40)


 


Hemoglobin


  


  13.2 g/dL


(12.0-15.5)


 


Hematocrit


  


  37.0 %


(36.0-47.0)


 


Mean Corpuscular Volume  84 fL () 


 


Mean Corpuscular Hemoglobin  30 pg (25-35) 


 


Mean Corpuscular Hemoglobin


Concent 


  36 g/dL


(31-37)


 


Red Cell Distribution Width


  


  13.0 %


(11.5-14.5)


 


Platelet Count


  


  248 x10^3/uL


(140-400)


 


Neutrophils (%) (Auto)  55 % (31-73) 


 


Lymphocytes (%) (Auto)  34 % (24-48) 


 


Monocytes (%) (Auto)  9 % (0-9) 


 


Eosinophils (%) (Auto)  2 % (0-3) 


 


Basophils (%) (Auto)  0 % (0-3) 


 


Neutrophils # (Auto)


  


  5.2 x10^3uL


(1.8-7.7)


 


Lymphocytes # (Auto)


  


  3.3 x10^3/uL


(1.0-4.8)


 


Monocytes # (Auto)


  


  0.9 x10^3/uL


(0.0-1.1)


 


Eosinophils # (Auto)


  


  0.1 x10^3/uL


(0.0-0.7)


 


Basophils # (Auto)


  


  0.0 x10^3/uL


(0.0-0.2)








Medications





Current Medications


Acetaminophen (Tylenol) 650 mg PRN Q6HRS  PRN PO FEVER;  Start 9/19/17 at 17:15


Ondansetron HCl (Zofran) 4 mg PRN Q6HRS  PRN IV NAUSEA/VOMITING;  Start 9/19/17 

at 17:15


Morphine Sulfate 2 mg PRN Q2HR  PRN IV PAIN;  Start 9/19/17 at 17:15


Tramadol HCl (Ultram) 50 mg PRN Q6HRS  PRN PO PAIN;  Start 9/19/17 at 17:15


Hydralazine HCl (Apresoline) 10 mg PRN Q4HRS  PRN IVP ELEVATED BP, SEE COMMENTS

;  Start 9/19/17 at 17:15


Docusate Sodium (Colace) 100 mg PRN DAILY  PRN PO CONSTIPATION Last 

administered on 9/21/17at 04:46;  Start 9/19/17 at 17:15


Hydromorphone HCl (Dilaudid) 2 mg PRN Q4HRS  PRN IV PAIN Last administered on 9/ 21/17at 12:14;  Start 9/19/17 at 17:15


Oxycodone/ Acetaminophen (Percocet 10/325) 1 tab PRN Q4HRS  PRN PO PAIN Last 

administered on 9/21/17at 04:46;  Start 9/19/17 at 17:15


Enoxaparin Sodium (Lovenox 40mg Syringe) 40 mg Q24H SQ  Last administered on 9/ 20/17at 18:00;  Start 9/19/17 at 18:00


Sumatriptan Succinate (Imitrex) 50 mg PRN Q2HR  PRN PO MIGRAINE HEADACHE Last 

administered on 9/20/17at 00:20;  Start 9/19/17 at 17:30;  Stop 9/20/17 at 08:46

;  Status DC


Clonazepam (KlonoPIN) 1 mg PRN TID  PRN PO ANXIETY Last administered on 9/21/ 17at 12:13;  Start 9/19/17 at 17:30


Diphenhydramine HCl (Benadryl) 50 mg PRN Q6HRS  PRN PO HEADACHE Last 

administered on 9/20/17at 00:20;  Start 9/19/17 at 17:30


Acetaminophen/ Hydrocodone Bitart (Lortab 5/325) 1 tab PRN Q4HRS  PRN PO PAIN 

Last administered on 9/21/17at 08:03;  Start 9/19/17 at 17:30


Metoprolol Tartrate (Lopressor) 25 mg QHS PO  Last administered on 9/20/17at 22:

22;  Start 9/19/17 at 21:00


Tizanidine HCl (Zanaflex) 4 mg PRN QID  PRN PO MUSCLE SPASMS;  Start 9/19/17 at 

17:30


Non-Formulary Medication 1 ml PRN BID  PRN NS MIGRAINE HEADACHE;  Start 9/19/17 

at 17:30;  Stop 9/20/17 at 08:46;  Status DC


Dronabinol (Marinol) 5 mg TIDAC PO  Last administered on 9/21/17at 11:30;  

Start 9/20/17 at 07:30


Non-Formulary Medication 1 tab DAILY PO  Last administered on 9/21/17at 08:04;  

Start 9/21/17 at 09:00


Ondansetron HCl (Zofran Odt) 8 mg PRN BID  PRN PO NAUSEA/VOMITING;  Start 9/19/ 17 at 17:45


Quetiapine Fumarate (SEROquel XR) 300 mg QHS PO  Last administered on 9/20/17at 

22:20;  Start 9/19/17 at 21:00


Ketorolac Tromethamine (Toradol) 15 mg PRN Q6HRS  PRN IV PAIN Last administered 

on 9/20/17at 02:37;  Start 9/19/17 at 17:30;  Stop 9/20/17 at 08:46;  Status DC


Diphenhydramine HCl (Benadryl) 25 mg PRN Q6HRS  PRN IVP ITCHING Last 

administered on 9/19/17at 18:58;  Start 9/19/17 at 17:30


Metoclopramide HCl (Reglan) 10 mg PRN Q6HRS  PRN IV NAUSEA/VOMITING Last 

administered on 9/20/17at 06:10;  Start 9/19/17 at 17:30;  Stop 9/20/17 at 08:46

;  Status DC


Gadobutrol (Gadavist) 7.5 mmol 1X  ONCE IV  Last administered on 9/19/17at 20:36

;  Start 9/19/17 at 20:15;  Stop 9/19/17 at 20:18;  Status DC


Metoclopramide HCl (Reglan) 10 mg Q8HRS IV  Last administered on 9/21/17at 14:15

;  Start 9/20/17 at 08:45


Dihydroergotamine Mesylate 1 mg/ Sodium Chloride 51 ml @  102 mls/hr Q8HRS IV  

Last administered on 9/21/17at 14:15;  Start 9/20/17 at 09:00;  Stop 9/22/17 at 

22:29


Influenza Virus Vaccine Quadrival (Fluarix Quad [id number] Syringe) 0.5 ml ONCE 

ONCE VAX IM  Last administered on 9/20/17at 13:45;  Start 9/20/17 at 13:45;  

Stop 9/20/17 at 13:46;  Status DC


Quetiapine Fumarate (SEROquel) 150 mg PRN QHS  PRN PO ANXIETY Last administered 

on 9/20/17at 23:35;  Start 9/20/17 at 22:15


Non-Formulary Medication 1 ea PRN DAILY  PRN PO SEE PACKAGE DIRECTIONS;  Start 9 /21/17 at 07:00;  Stop 9/22/17 at 13:00


Lidocaine/Sodium Bicarbonate (Buffered Lidocaine 1%) 20 ml STK-MED ONCE IJ ;  

Start 9/21/17 at 09:08;  Stop 9/21/17 at 09:09;  Status DC


Heparin Sodium/ Sodium Chloride 500 ml @  As Directed STK-MED ONCE .ROUTE ;  

Start 9/21/17 at 09:08;  Stop 9/21/17 at 09:09;  Status DC


Heparin Sodium/ Sodium Chloride 1,000 unit 1X  ONCE IART  Last administered on 9 /21/17at 09:59;  Start 9/21/17 at 09:15;  Stop 9/21/17 at 09:16;  Status DC


Lidocaine/Sodium Bicarbonate (Buffered Lidocaine 1%) 20 ml 1X  ONCE IJ  Last 

administered on 9/21/17at 09:15;  Start 9/21/17 at 09:15;  Stop 9/21/17 at 09:16

;  Status DC





Active Scripts


Active


Reported


Seroquel (Quetiapine Fumarate) 100 Mg Tablet 150 Mg PO PRN QHS PRN


Metoprolol Tartrate 25 Mg Tablet 1 Tab PO QHS


Dihydroergotamine Mesylate 1 Ml Spray.pump 1 Ml NS PRN BID PRN


Hydrocodone-Apap 5-325  ** (Hydrocodone Bit/Acetaminophen) 1 Each Tablet 1 Tab 

PO PRN Q4HRS PRN


Loestrin (Norethindrone A-E Estradiol) 1 Each Tablet 1 Tab PO DAILY


Clonazepam 1 Mg Tablet 1 Mg PO PRN TID PRN


Tizanidine Hcl 4 Mg Tablet 4 Mg PO QID PRN


Seroquel (Quetiapine Fumarate) 300 Mg Tablet 300 Mg PO HS


Marinol (Dronabinol) 5 Mg Capsule 5 Mg PO PRN TID PRN


Nifedipine Er (Nifedipine) 30 Mg Tab.er.24 1 Tab PO HS


Benadryl (Diphenhydramine Hcl) 25 Mg Capsule 2 Cap PO PRN Q6-8HRS PRN


Zofran (Ondansetron Hcl) 8 Mg Tablet 8 Mg PO BID PRN


Vitals/I & O





Vital Sign - Last 24 Hours








 9/20/17 9/20/17 9/20/17 9/20/17





 18:22 19:20 20:00 22:22


 


Temp  98.5  





  98.5  


 


Pulse  75  80


 


Resp  20  


 


B/P (MAP)  153/88 (109)  153/92


 


Pulse Ox 96 97  


 


O2 Delivery Room Air Room Air Room Air 


 


    





    





 9/20/17 9/21/17 9/21/17 9/21/17





 23:04 03:18 07:15 08:03


 


Temp 97.9  98.5 





 97.9  98.5 


 


Pulse 80  80 


 


Resp 18  18 


 


B/P (MAP) 153/92 (112)  122/76 (91) 


 


Pulse Ox 96  96 96


 


O2 Delivery Room Air Room Air Room Air Room Air


 


    





    





 9/21/17 9/21/17 9/21/17 9/21/17





 11:13 12:14 12:18 14:15


 


Temp 98.0   





 98.0   


 


Pulse 81   


 


Resp 16   


 


B/P (MAP) 125/70 (88)   


 


Pulse Ox 96 96 96 96


 


O2 Delivery Room Air Room Air Room Air Room Air


 


    





    





 9/21/17   





 15:09   


 


Temp 98.6   





 98.6   


 


Pulse 68   


 


Resp 18   


 


B/P (MAP) 153/81 (105)   


 


Pulse Ox 98   


 


O2 Delivery Room Air   














Intake and Output   


 


 9/21/17 9/21/17 9/22/17





 15:00 23:00 07:00


 


Intake Total 480 ml  


 


Balance 480 ml  

















JUAN C BILLS MD Sep 21, 2017 15:14

## 2017-09-22 NOTE — PDOC
PROGRESS NOTES


Chief Complaint


Chief Complaint


  Intractable  headache 2/2 migraine flare - status migranosis


Chronic migraine headache w. acute flare, 


HTN


OA


obese, BMI 33


coagulopathy NOS





History of Present Illness


History of Present Illness


minimal improved, still marked pain


 DHE infusion ongoing


she complains of severe pain, but looks a little improved,   


has slept some





less swollen at  PICC line,   on lovenox,  heme consulted for reported 

coagulopathy,  NOS,





Vitals


Vitals





Vital Signs








  Date Time  Temp Pulse Resp B/P (MAP) Pulse Ox O2 Delivery O2 Flow Rate FiO2


 


9/22/17 11:00 97.4 80 18 120/79 (93) 95 Room Air  





 97.4       











Physical Exam


General:  Alert, Cooperative, mild distress (pain)


Heart:  Regular rate


Lungs:  Clear, Other


Extremities:  No clubbing, No cyanosis





Labs


LABS





Laboratory Tests








Test


  9/22/17


04:25


 


White Blood Count


  9.1 x10^3/uL


(4.0-11.0)


 


Red Blood Count


  4.49 x10^6/uL


(3.50-5.40)


 


Hemoglobin


  13.2 g/dL


(12.0-15.5)


 


Hematocrit


  38.1 %


(36.0-47.0)


 


Mean Corpuscular Volume 85 fL () 


 


Mean Corpuscular Hemoglobin 30 pg (25-35) 


 


Mean Corpuscular Hemoglobin


Concent 35 g/dL


(31-37)


 


Red Cell Distribution Width


  13.1 %


(11.5-14.5)


 


Platelet Count


  227 x10^3/uL


(140-400)


 


Neutrophils (%) (Auto) 51 % (31-73) 


 


Lymphocytes (%) (Auto) 38 % (24-48) 


 


Monocytes (%) (Auto) 9 % (0-9) 


 


Eosinophils (%) (Auto) 2 % (0-3) 


 


Basophils (%) (Auto) 1 % (0-3) 


 


Neutrophils # (Auto)


  4.6 x10^3uL


(1.8-7.7)


 


Lymphocytes # (Auto)


  3.4 x10^3/uL


(1.0-4.8)


 


Monocytes # (Auto)


  0.8 x10^3/uL


(0.0-1.1)


 


Eosinophils # (Auto)


  0.2 x10^3/uL


(0.0-0.7)


 


Basophils # (Auto)


  0.0 x10^3/uL


(0.0-0.2)


 


Sodium Level


  140 mmol/L


(136-145)


 


Potassium Level


  3.4 mmol/L


(3.5-5.1)


 


Chloride Level


  105 mmol/L


()


 


Carbon Dioxide Level


  25 mmol/L


(21-32)


 


Anion Gap 10 (6-14) 


 


Blood Urea Nitrogen


  14 mg/dL


(7-20)


 


Creatinine


  0.7 mg/dL


(0.6-1.0)


 


Estimated GFR


(Cockcroft-Gault) 94.2 


 


 


BUN/Creatinine Ratio 20 (6-20) 


 


Glucose Level


  108 mg/dL


(70-99)


 


Calcium Level


  8.4 mg/dL


(8.5-10.1)


 


Total Bilirubin


  0.1 mg/dL


(0.2-1.0)


 


Aspartate Amino Transf


(AST/SGOT) 7 U/L (15-37) 


 


 


Alanine Aminotransferase


(ALT/SGPT) 31 U/L (14-59) 


 


 


Alkaline Phosphatase


  56 U/L


()


 


Total Protein


  6.4 g/dL


(6.4-8.2)


 


Albumin


  2.9 g/dL


(3.4-5.0)


 


Albumin/Globulin Ratio 0.8 (1.0-1.7) 











Review of Systems


Review of Systems


HA, nausea


parathesia to right ear,  poss aura





Comment


Review of Relevant


I have reviewed the following items amber (where applicable) has been applied.


Labs





Laboratory Tests








Test


  9/22/17


04:25


 


White Blood Count


  9.1 x10^3/uL


(4.0-11.0)


 


Red Blood Count


  4.49 x10^6/uL


(3.50-5.40)


 


Hemoglobin


  13.2 g/dL


(12.0-15.5)


 


Hematocrit


  38.1 %


(36.0-47.0)


 


Mean Corpuscular Volume 85 fL () 


 


Mean Corpuscular Hemoglobin 30 pg (25-35) 


 


Mean Corpuscular Hemoglobin


Concent 35 g/dL


(31-37)


 


Red Cell Distribution Width


  13.1 %


(11.5-14.5)


 


Platelet Count


  227 x10^3/uL


(140-400)


 


Neutrophils (%) (Auto) 51 % (31-73) 


 


Lymphocytes (%) (Auto) 38 % (24-48) 


 


Monocytes (%) (Auto) 9 % (0-9) 


 


Eosinophils (%) (Auto) 2 % (0-3) 


 


Basophils (%) (Auto) 1 % (0-3) 


 


Neutrophils # (Auto)


  4.6 x10^3uL


(1.8-7.7)


 


Lymphocytes # (Auto)


  3.4 x10^3/uL


(1.0-4.8)


 


Monocytes # (Auto)


  0.8 x10^3/uL


(0.0-1.1)


 


Eosinophils # (Auto)


  0.2 x10^3/uL


(0.0-0.7)


 


Basophils # (Auto)


  0.0 x10^3/uL


(0.0-0.2)


 


Sodium Level


  140 mmol/L


(136-145)


 


Potassium Level


  3.4 mmol/L


(3.5-5.1)


 


Chloride Level


  105 mmol/L


()


 


Carbon Dioxide Level


  25 mmol/L


(21-32)


 


Anion Gap 10 (6-14) 


 


Blood Urea Nitrogen


  14 mg/dL


(7-20)


 


Creatinine


  0.7 mg/dL


(0.6-1.0)


 


Estimated GFR


(Cockcroft-Gault) 94.2 


 


 


BUN/Creatinine Ratio 20 (6-20) 


 


Glucose Level


  108 mg/dL


(70-99)


 


Calcium Level


  8.4 mg/dL


(8.5-10.1)


 


Total Bilirubin


  0.1 mg/dL


(0.2-1.0)


 


Aspartate Amino Transf


(AST/SGOT) 7 U/L (15-37) 


 


 


Alanine Aminotransferase


(ALT/SGPT) 31 U/L (14-59) 


 


 


Alkaline Phosphatase


  56 U/L


()


 


Total Protein


  6.4 g/dL


(6.4-8.2)


 


Albumin


  2.9 g/dL


(3.4-5.0)


 


Albumin/Globulin Ratio 0.8 (1.0-1.7) 








Laboratory Tests








Test


  9/22/17


04:25


 


White Blood Count


  9.1 x10^3/uL


(4.0-11.0)


 


Red Blood Count


  4.49 x10^6/uL


(3.50-5.40)


 


Hemoglobin


  13.2 g/dL


(12.0-15.5)


 


Hematocrit


  38.1 %


(36.0-47.0)


 


Mean Corpuscular Volume 85 fL () 


 


Mean Corpuscular Hemoglobin 30 pg (25-35) 


 


Mean Corpuscular Hemoglobin


Concent 35 g/dL


(31-37)


 


Red Cell Distribution Width


  13.1 %


(11.5-14.5)


 


Platelet Count


  227 x10^3/uL


(140-400)


 


Neutrophils (%) (Auto) 51 % (31-73) 


 


Lymphocytes (%) (Auto) 38 % (24-48) 


 


Monocytes (%) (Auto) 9 % (0-9) 


 


Eosinophils (%) (Auto) 2 % (0-3) 


 


Basophils (%) (Auto) 1 % (0-3) 


 


Neutrophils # (Auto)


  4.6 x10^3uL


(1.8-7.7)


 


Lymphocytes # (Auto)


  3.4 x10^3/uL


(1.0-4.8)


 


Monocytes # (Auto)


  0.8 x10^3/uL


(0.0-1.1)


 


Eosinophils # (Auto)


  0.2 x10^3/uL


(0.0-0.7)


 


Basophils # (Auto)


  0.0 x10^3/uL


(0.0-0.2)


 


Sodium Level


  140 mmol/L


(136-145)


 


Potassium Level


  3.4 mmol/L


(3.5-5.1)


 


Chloride Level


  105 mmol/L


()


 


Carbon Dioxide Level


  25 mmol/L


(21-32)


 


Anion Gap 10 (6-14) 


 


Blood Urea Nitrogen


  14 mg/dL


(7-20)


 


Creatinine


  0.7 mg/dL


(0.6-1.0)


 


Estimated GFR


(Cockcroft-Gault) 94.2 


 


 


BUN/Creatinine Ratio 20 (6-20) 


 


Glucose Level


  108 mg/dL


(70-99)


 


Calcium Level


  8.4 mg/dL


(8.5-10.1)


 


Total Bilirubin


  0.1 mg/dL


(0.2-1.0)


 


Aspartate Amino Transf


(AST/SGOT) 7 U/L (15-37) 


 


 


Alanine Aminotransferase


(ALT/SGPT) 31 U/L (14-59) 


 


 


Alkaline Phosphatase


  56 U/L


()


 


Total Protein


  6.4 g/dL


(6.4-8.2)


 


Albumin


  2.9 g/dL


(3.4-5.0)


 


Albumin/Globulin Ratio 0.8 (1.0-1.7) 








Medications





Current Medications


Acetaminophen (Tylenol) 650 mg PRN Q6HRS  PRN PO FEVER;  Start 9/19/17 at 17:15


Ondansetron HCl (Zofran) 4 mg PRN Q6HRS  PRN IV NAUSEA/VOMITING;  Start 9/19/17 

at 17:15


Morphine Sulfate 2 mg PRN Q2HR  PRN IV PAIN;  Start 9/19/17 at 17:15


Tramadol HCl (Ultram) 50 mg PRN Q6HRS  PRN PO PAIN;  Start 9/19/17 at 17:15


Hydralazine HCl (Apresoline) 10 mg PRN Q4HRS  PRN IVP ELEVATED BP, SEE COMMENTS

;  Start 9/19/17 at 17:15


Docusate Sodium (Colace) 100 mg PRN DAILY  PRN PO CONSTIPATION Last 

administered on 9/21/17at 04:46;  Start 9/19/17 at 17:15


Hydromorphone HCl (Dilaudid) 2 mg PRN Q4HRS  PRN IV PAIN Last administered on 9/ 22/17at 09:59;  Start 9/19/17 at 17:15


Oxycodone/ Acetaminophen (Percocet 10/325) 1 tab PRN Q4HRS  PRN PO PAIN Last 

administered on 9/21/17at 04:46;  Start 9/19/17 at 17:15


Enoxaparin Sodium (Lovenox 40mg Syringe) 40 mg Q24H SQ  Last administered on 9/ 21/17at 17:41;  Start 9/19/17 at 18:00


Sumatriptan Succinate (Imitrex) 50 mg PRN Q2HR  PRN PO MIGRAINE HEADACHE Last 

administered on 9/20/17at 00:20;  Start 9/19/17 at 17:30;  Stop 9/20/17 at 08:46

;  Status DC


Clonazepam (KlonoPIN) 1 mg PRN TID  PRN PO ANXIETY Last administered on 9/21/ 17at 12:13;  Start 9/19/17 at 17:30


Diphenhydramine HCl (Benadryl) 50 mg PRN Q6HRS  PRN PO HEADACHE Last 

administered on 9/20/17at 00:20;  Start 9/19/17 at 17:30


Acetaminophen/ Hydrocodone Bitart (Lortab 5/325) 1 tab PRN Q4HRS  PRN PO PAIN 

Last administered on 9/21/17at 08:03;  Start 9/19/17 at 17:30


Metoprolol Tartrate (Lopressor) 25 mg QHS PO  Last administered on 9/21/17at 21:

11;  Start 9/19/17 at 21:00


Tizanidine HCl (Zanaflex) 4 mg PRN QID  PRN PO MUSCLE SPASMS Last administered 

on 9/21/17at 21:10;  Start 9/19/17 at 17:30


Non-Formulary Medication 1 ml PRN BID  PRN NS MIGRAINE HEADACHE;  Start 9/19/17 

at 17:30;  Stop 9/20/17 at 08:46;  Status DC


Dronabinol (Marinol) 5 mg TIDAC PO  Last administered on 9/22/17at 12:12;  

Start 9/20/17 at 07:30


Non-Formulary Medication 1 tab DAILY PO  Last administered on 9/22/17at 10:11;  

Start 9/21/17 at 09:00


Ondansetron HCl (Zofran Odt) 8 mg PRN BID  PRN PO NAUSEA/VOMITING;  Start 9/19/ 17 at 17:45


Quetiapine Fumarate (SEROquel XR) 300 mg QHS PO  Last administered on 9/21/17at 

21:11;  Start 9/19/17 at 21:00


Ketorolac Tromethamine (Toradol) 15 mg PRN Q6HRS  PRN IV PAIN Last administered 

on 9/20/17at 02:37;  Start 9/19/17 at 17:30;  Stop 9/20/17 at 08:46;  Status DC


Diphenhydramine HCl (Benadryl) 25 mg PRN Q6HRS  PRN IVP ITCHING Last 

administered on 9/19/17at 18:58;  Start 9/19/17 at 17:30


Metoclopramide HCl (Reglan) 10 mg PRN Q6HRS  PRN IV NAUSEA/VOMITING Last 

administered on 9/20/17at 06:10;  Start 9/19/17 at 17:30;  Stop 9/20/17 at 08:46

;  Status DC


Gadobutrol (Gadavist) 7.5 mmol 1X  ONCE IV  Last administered on 9/19/17at 20:36

;  Start 9/19/17 at 20:15;  Stop 9/19/17 at 20:18;  Status DC


Metoclopramide HCl (Reglan) 10 mg Q8HRS IV  Last administered on 9/22/17at 04:51

;  Start 9/20/17 at 08:45


Dihydroergotamine Mesylate 1 mg/ Sodium Chloride 51 ml @  102 mls/hr Q8HRS IV  

Last administered on 9/22/17at 04:51;  Start 9/20/17 at 09:00;  Stop 9/22/17 at 

22:29


Influenza Virus Vaccine Quadrival (Fluarix Quad [id number] Syringe) 0.5 ml ONCE 

ONCE VAX IM  Last administered on 9/20/17at 13:45;  Start 9/20/17 at 13:45;  

Stop 9/20/17 at 13:46;  Status DC


Quetiapine Fumarate (SEROquel) 150 mg PRN QHS  PRN PO ANXIETY Last administered 

on 9/20/17at 23:35;  Start 9/20/17 at 22:15


Non-Formulary Medication 1 ea PRN DAILY  PRN PO SEE PACKAGE DIRECTIONS;  Start 9 /21/17 at 07:00;  Stop 9/22/17 at 13:00;  Status DC


Lidocaine/Sodium Bicarbonate (Buffered Lidocaine 1%) 20 ml STK-MED ONCE IJ ;  

Start 9/21/17 at 09:08;  Stop 9/21/17 at 09:09;  Status DC


Heparin Sodium/ Sodium Chloride 500 ml @  As Directed STK-MED ONCE .ROUTE ;  

Start 9/21/17 at 09:08;  Stop 9/21/17 at 09:09;  Status DC


Heparin Sodium/ Sodium Chloride 1,000 unit 1X  ONCE IART  Last administered on 9 /21/17at 09:59;  Start 9/21/17 at 09:15;  Stop 9/21/17 at 09:16;  Status DC


Lidocaine/Sodium Bicarbonate (Buffered Lidocaine 1%) 20 ml 1X  ONCE IJ  Last 

administered on 9/21/17at 09:15;  Start 9/21/17 at 09:15;  Stop 9/21/17 at 09:16

;  Status DC


Lidocaine (Lidoderm) 1 patch DAILY TD  Last administered on 9/22/17at 10:00;  

Start 9/21/17 at 21:30


Methylprednisolone Sodium Succinate (SOLU-Medrol 125MG VIAL) 250 mg 1X  ONCE IV

  Last administered on 9/22/17at 12:13;  Start 9/22/17 at 12:00;  Stop 9/22/17 

at 12:01;  Status DC





Active Scripts


Active


Reported


Seroquel (Quetiapine Fumarate) 100 Mg Tablet 150 Mg PO PRN QHS PRN


Metoprolol Tartrate 25 Mg Tablet 1 Tab PO QHS


Dihydroergotamine Mesylate 1 Ml Spray.pump 1 Ml NS PRN BID PRN


Hydrocodone-Apap 5-325  ** (Hydrocodone Bit/Acetaminophen) 1 Each Tablet 1 Tab 

PO PRN Q4HRS PRN


Loestrin (Norethindrone A-E Estradiol) 1 Each Tablet 1 Tab PO DAILY


Clonazepam 1 Mg Tablet 1 Mg PO PRN TID PRN


Tizanidine Hcl 4 Mg Tablet 4 Mg PO QID PRN


Seroquel (Quetiapine Fumarate) 300 Mg Tablet 300 Mg PO HS


Marinol (Dronabinol) 5 Mg Capsule 5 Mg PO PRN TID PRN


Nifedipine Er (Nifedipine) 30 Mg Tab.er.24 1 Tab PO HS


Benadryl (Diphenhydramine Hcl) 25 Mg Capsule 2 Cap PO PRN Q6-8HRS PRN


Zofran (Ondansetron Hcl) 8 Mg Tablet 8 Mg PO BID PRN


Vitals/I & O





Vital Sign - Last 24 Hours








 9/21/17 9/21/17 9/21/17 9/21/17





 15:09 19:49 20:00 21:11


 


Temp 98.6 98.5  





 98.6 98.5  


 


Pulse 68 94  94


 


Resp 18 18  


 


B/P (MAP) 153/81 (105) 139/81 (100)  139/81


 


Pulse Ox 98 98  


 


O2 Delivery Room Air Room Air Room Air 


 


    





    





 9/21/17 9/22/17 9/22/17 9/22/17





 23:26 02:50 07:00 08:00


 


Temp 98.3  97.6 





 98.3  97.6 


 


Pulse 86  79 


 


Resp 18 16 18 


 


B/P (MAP) 131/78 (95)  138/70 (92) 


 


Pulse Ox 98  95 


 


O2 Delivery Room Air  Room Air Room Air


 


    





    





 9/22/17 9/22/17 9/22/17 





 09:59 10:29 11:00 


 


Temp   97.4 





   97.4 


 


Pulse   80 


 


Resp   18 


 


B/P (MAP)   120/79 (93) 


 


Pulse Ox 95 95 95 


 


O2 Delivery Room Air Room Air Room Air 

















JAGDEEP WEAVER MD Sep 22, 2017 13:28

## 2017-09-22 NOTE — RAD
Right upper extremity venous duplex ultrasound study without comparison for

right upper extremity pain status post PICC line placement, unable to abduct

arm, very tender.



Technique an findings: Real-time grayscale and color spectral Doppler

evaluation of the veins of the right upper extremity is performed. The right

internal jugular vein is patent and compressible, with normal respiratory

phasicity. The subclavian vein, axillary vein, paired brachial veins, and

basilic vein are all patent and demonstrate normal compressibility. There is

flow augmentation within the brachial, axillary, and subclavian veins. The

cephalic vein is diminutive and poorly seen. The ulnar and radial veins are

patent. There is a PICC line which enters the high basilic vein very near its

confluence with the brachial vein. There is no evidence of deep or superficial

vein thrombosis of the right upper extremity.



Impression:

1. No evidence of upper extremity deep or superficial venous thrombosis.

## 2017-09-22 NOTE — PDOC
PROGRESS NOTES


Assessment


Intractable migraine headache, status migrainosus


Plan


Continue DHE through the day


Allergic to Decadron. This consisted of paresthesias and she had received 

Cogentin at the same time she had Decadron. After discussion, the patient is 

willing to take a single dose of IV Solu-Medrol


I asked the patient to avoid the narcotics as they can interfere with the DHE 

effect. Nonetheless she had to take some Dilaudid.  points out that he 

takes DHE and narcotics for his migraines.


Tomorrow, will start narcotic PCA continuously if she is no better.


Patient does not need any additional testing.


Subjective


No change in headache


Objective





Vital Signs








  Date Time  Temp Pulse Resp B/P (MAP) Pulse Ox O2 Delivery O2 Flow Rate FiO2


 


9/22/17 09:59     95 Room Air  


 


9/22/17 07:00 97.6 79 18 138/70 (92)    





 97.6       








PHYSICAL EXAM


Still has severe headache, but eye shades off 


Alert. Oriented to time, place and person.


PERRL.


EOMI.


CN: no focal findings.


Muscle tone: normal.


Muscle strength: 5/5


DTR: 2+


Plantar reflex: Flexor


Gait: not examined in bed.


Sensory exam: no abnormal findings.


No cerebellar signs elicited.


Review of Relevant


I have reviewed the following items amber (where applicable) has been applied.


Labs





Laboratory Tests








Test


  9/22/17


04:25


 


White Blood Count


  9.1 x10^3/uL


(4.0-11.0)


 


Red Blood Count


  4.49 x10^6/uL


(3.50-5.40)


 


Hemoglobin


  13.2 g/dL


(12.0-15.5)


 


Hematocrit


  38.1 %


(36.0-47.0)


 


Mean Corpuscular Volume 85 fL () 


 


Mean Corpuscular Hemoglobin 30 pg (25-35) 


 


Mean Corpuscular Hemoglobin


Concent 35 g/dL


(31-37)


 


Red Cell Distribution Width


  13.1 %


(11.5-14.5)


 


Platelet Count


  227 x10^3/uL


(140-400)


 


Neutrophils (%) (Auto) 51 % (31-73) 


 


Lymphocytes (%) (Auto) 38 % (24-48) 


 


Monocytes (%) (Auto) 9 % (0-9) 


 


Eosinophils (%) (Auto) 2 % (0-3) 


 


Basophils (%) (Auto) 1 % (0-3) 


 


Neutrophils # (Auto)


  4.6 x10^3uL


(1.8-7.7)


 


Lymphocytes # (Auto)


  3.4 x10^3/uL


(1.0-4.8)


 


Monocytes # (Auto)


  0.8 x10^3/uL


(0.0-1.1)


 


Eosinophils # (Auto)


  0.2 x10^3/uL


(0.0-0.7)


 


Basophils # (Auto)


  0.0 x10^3/uL


(0.0-0.2)


 


Sodium Level


  140 mmol/L


(136-145)


 


Potassium Level


  3.4 mmol/L


(3.5-5.1)


 


Chloride Level


  105 mmol/L


()


 


Carbon Dioxide Level


  25 mmol/L


(21-32)


 


Anion Gap 10 (6-14) 


 


Blood Urea Nitrogen


  14 mg/dL


(7-20)


 


Creatinine


  0.7 mg/dL


(0.6-1.0)


 


Estimated GFR


(Cockcroft-Gault) 94.2 


 


 


BUN/Creatinine Ratio 20 (6-20) 


 


Glucose Level


  108 mg/dL


(70-99)


 


Calcium Level


  8.4 mg/dL


(8.5-10.1)


 


Total Bilirubin


  0.1 mg/dL


(0.2-1.0)


 


Aspartate Amino Transf


(AST/SGOT) 7 U/L (15-37) 


 


 


Alanine Aminotransferase


(ALT/SGPT) 31 U/L (14-59) 


 


 


Alkaline Phosphatase


  56 U/L


()


 


Total Protein


  6.4 g/dL


(6.4-8.2)


 


Albumin


  2.9 g/dL


(3.4-5.0)


 


Albumin/Globulin Ratio 0.8 (1.0-1.7) 








Laboratory Tests








Test


  9/22/17


04:25


 


White Blood Count


  9.1 x10^3/uL


(4.0-11.0)


 


Red Blood Count


  4.49 x10^6/uL


(3.50-5.40)


 


Hemoglobin


  13.2 g/dL


(12.0-15.5)


 


Hematocrit


  38.1 %


(36.0-47.0)


 


Mean Corpuscular Volume 85 fL () 


 


Mean Corpuscular Hemoglobin 30 pg (25-35) 


 


Mean Corpuscular Hemoglobin


Concent 35 g/dL


(31-37)


 


Red Cell Distribution Width


  13.1 %


(11.5-14.5)


 


Platelet Count


  227 x10^3/uL


(140-400)


 


Neutrophils (%) (Auto) 51 % (31-73) 


 


Lymphocytes (%) (Auto) 38 % (24-48) 


 


Monocytes (%) (Auto) 9 % (0-9) 


 


Eosinophils (%) (Auto) 2 % (0-3) 


 


Basophils (%) (Auto) 1 % (0-3) 


 


Neutrophils # (Auto)


  4.6 x10^3uL


(1.8-7.7)


 


Lymphocytes # (Auto)


  3.4 x10^3/uL


(1.0-4.8)


 


Monocytes # (Auto)


  0.8 x10^3/uL


(0.0-1.1)


 


Eosinophils # (Auto)


  0.2 x10^3/uL


(0.0-0.7)


 


Basophils # (Auto)


  0.0 x10^3/uL


(0.0-0.2)


 


Sodium Level


  140 mmol/L


(136-145)


 


Potassium Level


  3.4 mmol/L


(3.5-5.1)


 


Chloride Level


  105 mmol/L


()


 


Carbon Dioxide Level


  25 mmol/L


(21-32)


 


Anion Gap 10 (6-14) 


 


Blood Urea Nitrogen


  14 mg/dL


(7-20)


 


Creatinine


  0.7 mg/dL


(0.6-1.0)


 


Estimated GFR


(Cockcroft-Gault) 94.2 


 


 


BUN/Creatinine Ratio 20 (6-20) 


 


Glucose Level


  108 mg/dL


(70-99)


 


Calcium Level


  8.4 mg/dL


(8.5-10.1)


 


Total Bilirubin


  0.1 mg/dL


(0.2-1.0)


 


Aspartate Amino Transf


(AST/SGOT) 7 U/L (15-37) 


 


 


Alanine Aminotransferase


(ALT/SGPT) 31 U/L (14-59) 


 


 


Alkaline Phosphatase


  56 U/L


()


 


Total Protein


  6.4 g/dL


(6.4-8.2)


 


Albumin


  2.9 g/dL


(3.4-5.0)


 


Albumin/Globulin Ratio 0.8 (1.0-1.7) 








Medications





Current Medications


Acetaminophen (Tylenol) 650 mg PRN Q6HRS  PRN PO FEVER;  Start 9/19/17 at 17:15


Ondansetron HCl (Zofran) 4 mg PRN Q6HRS  PRN IV NAUSEA/VOMITING;  Start 9/19/17 

at 17:15


Morphine Sulfate 2 mg PRN Q2HR  PRN IV PAIN;  Start 9/19/17 at 17:15


Tramadol HCl (Ultram) 50 mg PRN Q6HRS  PRN PO PAIN;  Start 9/19/17 at 17:15


Hydralazine HCl (Apresoline) 10 mg PRN Q4HRS  PRN IVP ELEVATED BP, SEE COMMENTS

;  Start 9/19/17 at 17:15


Docusate Sodium (Colace) 100 mg PRN DAILY  PRN PO CONSTIPATION Last 

administered on 9/21/17at 04:46;  Start 9/19/17 at 17:15


Hydromorphone HCl (Dilaudid) 2 mg PRN Q4HRS  PRN IV PAIN Last administered on 9/ 22/17at 09:59;  Start 9/19/17 at 17:15


Oxycodone/ Acetaminophen (Percocet 10/325) 1 tab PRN Q4HRS  PRN PO PAIN Last 

administered on 9/21/17at 04:46;  Start 9/19/17 at 17:15


Enoxaparin Sodium (Lovenox 40mg Syringe) 40 mg Q24H SQ  Last administered on 9/ 21/17at 17:41;  Start 9/19/17 at 18:00


Sumatriptan Succinate (Imitrex) 50 mg PRN Q2HR  PRN PO MIGRAINE HEADACHE Last 

administered on 9/20/17at 00:20;  Start 9/19/17 at 17:30;  Stop 9/20/17 at 08:46

;  Status DC


Clonazepam (KlonoPIN) 1 mg PRN TID  PRN PO ANXIETY Last administered on 9/21/ 17at 12:13;  Start 9/19/17 at 17:30


Diphenhydramine HCl (Benadryl) 50 mg PRN Q6HRS  PRN PO HEADACHE Last 

administered on 9/20/17at 00:20;  Start 9/19/17 at 17:30


Acetaminophen/ Hydrocodone Bitart (Lortab 5/325) 1 tab PRN Q4HRS  PRN PO PAIN 

Last administered on 9/21/17at 08:03;  Start 9/19/17 at 17:30


Metoprolol Tartrate (Lopressor) 25 mg QHS PO  Last administered on 9/21/17at 21:

11;  Start 9/19/17 at 21:00


Tizanidine HCl (Zanaflex) 4 mg PRN QID  PRN PO MUSCLE SPASMS Last administered 

on 9/21/17at 21:10;  Start 9/19/17 at 17:30


Non-Formulary Medication 1 ml PRN BID  PRN NS MIGRAINE HEADACHE;  Start 9/19/17 

at 17:30;  Stop 9/20/17 at 08:46;  Status DC


Dronabinol (Marinol) 5 mg TIDAC PO  Last administered on 9/22/17at 09:59;  

Start 9/20/17 at 07:30


Non-Formulary Medication 1 tab DAILY PO  Last administered on 9/22/17at 10:11;  

Start 9/21/17 at 09:00


Ondansetron HCl (Zofran Odt) 8 mg PRN BID  PRN PO NAUSEA/VOMITING;  Start 9/19/ 17 at 17:45


Quetiapine Fumarate (SEROquel XR) 300 mg QHS PO  Last administered on 9/21/17at 

21:11;  Start 9/19/17 at 21:00


Ketorolac Tromethamine (Toradol) 15 mg PRN Q6HRS  PRN IV PAIN Last administered 

on 9/20/17at 02:37;  Start 9/19/17 at 17:30;  Stop 9/20/17 at 08:46;  Status DC


Diphenhydramine HCl (Benadryl) 25 mg PRN Q6HRS  PRN IVP ITCHING Last 

administered on 9/19/17at 18:58;  Start 9/19/17 at 17:30


Metoclopramide HCl (Reglan) 10 mg PRN Q6HRS  PRN IV NAUSEA/VOMITING Last 

administered on 9/20/17at 06:10;  Start 9/19/17 at 17:30;  Stop 9/20/17 at 08:46

;  Status DC


Gadobutrol (Gadavist) 7.5 mmol 1X  ONCE IV  Last administered on 9/19/17at 20:36

;  Start 9/19/17 at 20:15;  Stop 9/19/17 at 20:18;  Status DC


Metoclopramide HCl (Reglan) 10 mg Q8HRS IV  Last administered on 9/22/17at 04:51

;  Start 9/20/17 at 08:45


Dihydroergotamine Mesylate 1 mg/ Sodium Chloride 51 ml @  102 mls/hr Q8HRS IV  

Last administered on 9/22/17at 04:51;  Start 9/20/17 at 09:00;  Stop 9/22/17 at 

22:29


Influenza Virus Vaccine Quadrival (Fluarix Quad [id number] Syringe) 0.5 ml ONCE 

ONCE VAX IM  Last administered on 9/20/17at 13:45;  Start 9/20/17 at 13:45;  

Stop 9/20/17 at 13:46;  Status DC


Quetiapine Fumarate (SEROquel) 150 mg PRN QHS  PRN PO ANXIETY Last administered 

on 9/20/17at 23:35;  Start 9/20/17 at 22:15


Non-Formulary Medication 1 ea PRN DAILY  PRN PO SEE PACKAGE DIRECTIONS;  Start 9 /21/17 at 07:00;  Stop 9/22/17 at 13:00


Lidocaine/Sodium Bicarbonate (Buffered Lidocaine 1%) 20 ml STK-MED ONCE IJ ;  

Start 9/21/17 at 09:08;  Stop 9/21/17 at 09:09;  Status DC


Heparin Sodium/ Sodium Chloride 500 ml @  As Directed STK-MED ONCE .ROUTE ;  

Start 9/21/17 at 09:08;  Stop 9/21/17 at 09:09;  Status DC


Heparin Sodium/ Sodium Chloride 1,000 unit 1X  ONCE IART  Last administered on 9 /21/17at 09:59;  Start 9/21/17 at 09:15;  Stop 9/21/17 at 09:16;  Status DC


Lidocaine/Sodium Bicarbonate (Buffered Lidocaine 1%) 20 ml 1X  ONCE IJ  Last 

administered on 9/21/17at 09:15;  Start 9/21/17 at 09:15;  Stop 9/21/17 at 09:16

;  Status DC


Lidocaine (Lidoderm) 1 patch DAILY TD  Last administered on 9/22/17at 10:00;  

Start 9/21/17 at 21:30





Active Scripts


Active


Reported


Seroquel (Quetiapine Fumarate) 100 Mg Tablet 150 Mg PO PRN QHS PRN


Metoprolol Tartrate 25 Mg Tablet 1 Tab PO QHS


Dihydroergotamine Mesylate 1 Ml Spray.pump 1 Ml NS PRN BID PRN


Hydrocodone-Apap 5-325  ** (Hydrocodone Bit/Acetaminophen) 1 Each Tablet 1 Tab 

PO PRN Q4HRS PRN


Loestrin (Norethindrone A-E Estradiol) 1 Each Tablet 1 Tab PO DAILY


Clonazepam 1 Mg Tablet 1 Mg PO PRN TID PRN


Tizanidine Hcl 4 Mg Tablet 4 Mg PO QID PRN


Seroquel (Quetiapine Fumarate) 300 Mg Tablet 300 Mg PO HS


Marinol (Dronabinol) 5 Mg Capsule 5 Mg PO PRN TID PRN


Nifedipine Er (Nifedipine) 30 Mg Tab.er.24 1 Tab PO HS


Benadryl (Diphenhydramine Hcl) 25 Mg Capsule 2 Cap PO PRN Q6-8HRS PRN


Zofran (Ondansetron Hcl) 8 Mg Tablet 8 Mg PO BID PRN


Vitals/I & O





Vital Sign - Last 24 Hours








 9/21/17 9/21/17 9/21/17 9/21/17





 11:13 12:14 12:18 14:15


 


Temp 98.0   





 98.0   


 


Pulse 81   


 


Resp 16   


 


B/P (MAP) 125/70 (88)   


 


Pulse Ox 96 96 96 96


 


O2 Delivery Room Air Room Air Room Air Room Air


 


    





    





 9/21/17 9/21/17 9/21/17 9/21/17





 15:09 19:49 20:00 21:11


 


Temp 98.6 98.5  





 98.6 98.5  


 


Pulse 68 94  94


 


Resp 18 18  


 


B/P (MAP) 153/81 (105) 139/81 (100)  139/81


 


Pulse Ox 98 98  


 


O2 Delivery Room Air Room Air Room Air 


 


    





    





 9/21/17 9/22/17 9/22/17 9/22/17





 23:26 02:50 07:00 09:59


 


Temp 98.3  97.6 





 98.3  97.6 


 


Pulse 86  79 


 


Resp 18 16 18 


 


B/P (MAP) 131/78 (95)  138/70 (92) 


 


Pulse Ox 98  95 95


 


O2 Delivery Room Air  Room Air Room Air

















JUAN C BILLS MD Sep 22, 2017 10:27

## 2017-09-22 NOTE — PDOC
Provider Note


Provider Note


Hem/Onc consult





1. Hypercoag state per pt.


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


Hypercoag labs ordered





See dictation











WON DAWSON MD Sep 22, 2017 12:45

## 2017-09-22 NOTE — CONS
DATE OF CONSULTATION:  09/22/2017



HEMATOLOGY ONCOLOGY CONSULTATION



DATE OF SERVICE:  09/22/2017



REQUESTING PHYSICIAN:  Dr. Diann Steve.



REASON FOR CONSULTATION:  History of superficial vein thrombosis and history of

abnormal hypercoagulable labs per patient.



HISTORY OF PRESENT ILLNESS:  The patient is a 37-year-old  female who

reports that she has had episodes of superficial vein thrombosis associated with

IV access, which has happened several times in the past.  She mentions that her

primary care physician put her on aspirin when she was at the age of 20 because

her blood was too thick per patient.  She took aspirin for a year and a half and

then she quit taking aspirin.  Later on, she has noticed that she has a tendency

to develop superficial vein thrombosis whenever she has an IV access.  She has

never had deep vein thrombosis and she has never required anticoagulation.



[facility] for migraine

headaches.  She has had an MRI of the brain on 09/14/2017, which revealed no

acute intracranial findings.



She reports that she had hypercoagulable state workup in the past through one of

her physicians and she was to hold that one of her blood tests was abnormal, but

she is not sure what test and she is not sure where it was done, but she would

like to get it checked while she is here during this admission.



She denies nosebleeds or gum bleeding.  No hematemesis, melena, hematochezia, no

hemoptysis or hematuria.  No loss of weight or loss of appetite.



PAST MEDICAL HISTORY:  Hypertension, migraine headaches.



FAMILY HISTORY:  Positive for hypertension.



SOCIAL HISTORY:  No smoking or alcohol abuse.



REVIEW OF SYSTEMS:  A 12-point review of system was performed.  Pertinent

positives are mentioned in the history of present illness.  Rest of the system

review is negative.



PHYSICAL EXAMINATION:

GENERAL APPEARANCE:  The patient is a 37-year-old  female who is in no

acute cardiorespiratory distress.

VITAL SIGNS:  Blood pressure 120/79, temperature 97.4.

HEENT:  Atraumatic, normocephalic.  Eyes:  No icterus.

NECK:  Supple.

CHEST:  Bilaterally symmetrical.

HEART:  S1, S2 normal.

ABDOMEN:  Soft, nontender.

CENTRAL NERVOUS SYSTEM:  No focal deficits.

LYMPHATICS:  No lymphadenopathy.

SKIN:  No rashes.

PSYCHOLOGIC:  Mood and affect are appropriate.

MUSCULOSKELETAL:  No joint effusions.



LABORATORY DATA:  WBC 9.1, hemoglobin 13.2, platelet count 227.  Creatinine 0.7.



IMPRESSION AND PLAN:

1.  Hypercoagulable state per patient's history.  She has never had a deep vein

thrombosis.  However, she does report having had several episodes of superficial

vein thrombosis after IV access, particularly in the antecubital fossa.  She

would like to proceed with hypercoagulable state workup for diagnostic purposes.

 I have ordered factor V Leiden, prothrombin gene mutation, lupus anticoagulant,

anticardiolipin antibodies, protein C, protein S and antithrombin 3.  I also

mentioned to the patient that if any of the above workup is abnormal, I would

still not recommend anticoagulation since there is no evidence of deep vein

thrombosis.  However, this would be useful for her to take necessary precautions

to prevent a thromboembolic event.

2.  Migraine headaches.  Appreciate Neurology consultation and management.



I discussed with Dr. Diann Steve.

 



______________________________

WON DAWSON MD



DR:  [email or web address]  JOB#:  [id number] / [id number]

DD:  09/22/2017 14:22  DT:  09/22/2017 16:15

TH[facility]PSON

## 2017-09-23 NOTE — PDOC
PROGRESS NOTES


Chief Complaint


Chief Complaint


  Intractable  headache 2/2 migraine flare - status migranosis


Chronic migraine headache w. acute flare, 


HTN


OA


obese, BMI 33


coagulopathy NOS





History of Present Illness


History of Present Illness


minimal improved, still marked pain,   DHE infusion stopped early this AM,  IV 

solumedrol given last night, cont PO


change somemeds, DC ultram as avail, sched Oxycontin,  Neuro following


HA now 7/10


 


has slept some





less swollen at  PICC line,   on lovenox,  heme consulted for reported 

coagulopathy,  NOS,





Vitals


Vitals





Vital Signs








  Date Time  Temp Pulse Resp B/P (MAP) Pulse Ox O2 Delivery O2 Flow Rate FiO2


 


9/23/17 11:36   16  96 Room Air  


 


9/23/17 11:00 97.7 91  111/65 (80)    





 97.7       











Physical Exam


General:  Alert, Cooperative, mild distress (pain)


Heart:  Regular rate


Lungs:  Clear, Other


Extremities:  No clubbing, No cyanosis





Review of Systems


Review of Systems


HA


nausde


insomnia





Comment


Review of Relevant


I have reviewed the following items amber (where applicable) has been applied.


Labs





Laboratory Tests








Test


  9/22/17


04:25


 


White Blood Count


  9.1 x10^3/uL


(4.0-11.0)


 


Red Blood Count


  4.49 x10^6/uL


(3.50-5.40)


 


Hemoglobin


  13.2 g/dL


(12.0-15.5)


 


Hematocrit


  38.1 %


(36.0-47.0)


 


Mean Corpuscular Volume 85 fL () 


 


Mean Corpuscular Hemoglobin 30 pg (25-35) 


 


Mean Corpuscular Hemoglobin


Concent 35 g/dL


(31-37)


 


Red Cell Distribution Width


  13.1 %


(11.5-14.5)


 


Platelet Count


  227 x10^3/uL


(140-400)


 


Neutrophils (%) (Auto) 51 % (31-73) 


 


Lymphocytes (%) (Auto) 38 % (24-48) 


 


Monocytes (%) (Auto) 9 % (0-9) 


 


Eosinophils (%) (Auto) 2 % (0-3) 


 


Basophils (%) (Auto) 1 % (0-3) 


 


Neutrophils # (Auto)


  4.6 x10^3uL


(1.8-7.7)


 


Lymphocytes # (Auto)


  3.4 x10^3/uL


(1.0-4.8)


 


Monocytes # (Auto)


  0.8 x10^3/uL


(0.0-1.1)


 


Eosinophils # (Auto)


  0.2 x10^3/uL


(0.0-0.7)


 


Basophils # (Auto)


  0.0 x10^3/uL


(0.0-0.2)


 


Sodium Level


  140 mmol/L


(136-145)


 


Potassium Level


  3.4 mmol/L


(3.5-5.1)


 


Chloride Level


  105 mmol/L


()


 


Carbon Dioxide Level


  25 mmol/L


(21-32)


 


Anion Gap 10 (6-14) 


 


Blood Urea Nitrogen


  14 mg/dL


(7-20)


 


Creatinine


  0.7 mg/dL


(0.6-1.0)


 


Estimated GFR


(Cockcroft-Gault) 94.2 


 


 


BUN/Creatinine Ratio 20 (6-20) 


 


Glucose Level


  108 mg/dL


(70-99)


 


Calcium Level


  8.4 mg/dL


(8.5-10.1)


 


Total Bilirubin


  0.1 mg/dL


(0.2-1.0)


 


Aspartate Amino Transf


(AST/SGOT) 7 U/L (15-37) 


 


 


Alanine Aminotransferase


(ALT/SGPT) 31 U/L (14-59) 


 


 


Alkaline Phosphatase


  56 U/L


()


 


Total Protein


  6.4 g/dL


(6.4-8.2)


 


Albumin


  2.9 g/dL


(3.4-5.0)


 


Albumin/Globulin Ratio 0.8 (1.0-1.7) 








Medications





Current Medications


Acetaminophen (Tylenol) 650 mg PRN Q6HRS  PRN PO FEVER;  Start 9/19/17 at 17:15


Ondansetron HCl (Zofran) 4 mg PRN Q6HRS  PRN IV NAUSEA/VOMITING;  Start 9/19/17 

at 17:15


Morphine Sulfate 2 mg PRN Q2HR  PRN IV PAIN;  Start 9/19/17 at 17:15


Tramadol HCl (Ultram) 50 mg PRN Q6HRS  PRN PO PAIN Last administered on 9/22/ 17at 21:49;  Start 9/19/17 at 17:15;  Stop 9/23/17 at 11:25;  Status DC


Hydralazine HCl (Apresoline) 10 mg PRN Q4HRS  PRN IVP ELEVATED BP, SEE COMMENTS

;  Start 9/19/17 at 17:15


Docusate Sodium (Colace) 100 mg PRN DAILY  PRN PO CONSTIPATION Last 

administered on 9/21/17at 04:46;  Start 9/19/17 at 17:15


Hydromorphone HCl (Dilaudid) 2 mg PRN Q4HRS  PRN IV PAIN Last administered on 9/ 22/17at 18:20;  Start 9/19/17 at 17:15


Oxycodone/ Acetaminophen (Percocet 10/325) 1 tab PRN Q4HRS  PRN PO MODERATE TO 

SEVERE PAIN Last administered on 9/23/17at 08:54;  Start 9/19/17 at 17:15


Enoxaparin Sodium (Lovenox 40mg Syringe) 40 mg Q24H SQ  Last administered on 9/ 22/17at 18:21;  Start 9/19/17 at 18:00


Sumatriptan Succinate (Imitrex) 50 mg PRN Q2HR  PRN PO MIGRAINE HEADACHE Last 

administered on 9/20/17at 00:20;  Start 9/19/17 at 17:30;  Stop 9/20/17 at 08:46

;  Status DC


Clonazepam (KlonoPIN) 1 mg PRN TID  PRN PO ANXIETY Last administered on 9/21/ 17at 12:13;  Start 9/19/17 at 17:30


Diphenhydramine HCl (Benadryl) 50 mg PRN Q6HRS  PRN PO HEADACHE Last 

administered on 9/20/17at 00:20;  Start 9/19/17 at 17:30


Acetaminophen/ Hydrocodone Bitart (Lortab 5/325) 1 tab PRN Q4HRS  PRN PO MILD 

TO MODERATE PAIN Last administered on 9/23/17at 03:46;  Start 9/19/17 at 17:30


Metoprolol Tartrate (Lopressor) 25 mg QHS PO  Last administered on 9/22/17at 21:

53;  Start 9/19/17 at 21:00


Tizanidine HCl (Zanaflex) 4 mg PRN QID  PRN PO MUSCLE SPASMS Last administered 

on 9/23/17at 11:36;  Start 9/19/17 at 17:30


Non-Formulary Medication 1 ml PRN BID  PRN NS MIGRAINE HEADACHE;  Start 9/19/17 

at 17:30;  Stop 9/20/17 at 08:46;  Status DC


Dronabinol (Marinol) 5 mg TIDAC PO  Last administered on 9/23/17at 11:36;  

Start 9/20/17 at 07:30


Non-Formulary Medication 1 tab DAILY PO  Last administered on 9/23/17at 08:57;  

Start 9/21/17 at 09:00


Ondansetron HCl (Zofran Odt) 8 mg PRN BID  PRN PO NAUSEA/VOMITING Last 

administered on 9/23/17at 03:53;  Start 9/19/17 at 17:45


Quetiapine Fumarate (SEROquel XR) 300 mg QHS PO  Last administered on 9/22/17at 

22:09;  Start 9/19/17 at 21:00


Ketorolac Tromethamine (Toradol) 15 mg PRN Q6HRS  PRN IV PAIN Last administered 

on 9/20/17at 02:37;  Start 9/19/17 at 17:30;  Stop 9/20/17 at 08:46;  Status DC


Diphenhydramine HCl (Benadryl) 25 mg PRN Q6HRS  PRN IVP ITCHING Last 

administered on 9/23/17at 03:46;  Start 9/19/17 at 17:30


Metoclopramide HCl (Reglan) 10 mg PRN Q6HRS  PRN IV NAUSEA/VOMITING Last 

administered on 9/20/17at 06:10;  Start 9/19/17 at 17:30;  Stop 9/20/17 at 08:46

;  Status DC


Gadobutrol (Gadavist) 7.5 mmol 1X  ONCE IV  Last administered on 9/19/17at 20:36

;  Start 9/19/17 at 20:15;  Stop 9/19/17 at 20:18;  Status DC


Metoclopramide HCl (Reglan) 10 mg Q8HRS IV  Last administered on 9/23/17at 13:47

;  Start 9/20/17 at 08:45


Dihydroergotamine Mesylate 1 mg/ Sodium Chloride 51 ml @  102 mls/hr Q8HRS IV  

Last administered on 9/22/17at 21:48;  Start 9/20/17 at 09:00;  Stop 9/22/17 at 

22:29;  Status DC


Influenza Virus Vaccine Quadrival (Fluarix Quad [id number] Syringe) 0.5 ml ONCE 

ONCE VAX IM  Last administered on 9/20/17at 13:45;  Start 9/20/17 at 13:45;  

Stop 9/20/17 at 13:46;  Status DC


Quetiapine Fumarate (SEROquel) 150 mg PRN QHS  PRN PO ANXIETY Last administered 

on 9/22/17at 23:15;  Start 9/20/17 at 22:15


Non-Formulary Medication 1 ea PRN DAILY  PRN PO SEE PACKAGE DIRECTIONS;  Start 9 /21/17 at 07:00;  Stop 9/22/17 at 13:00;  Status DC


Lidocaine/Sodium Bicarbonate (Buffered Lidocaine 1%) 20 ml STK-MED ONCE IJ ;  

Start 9/21/17 at 09:08;  Stop 9/21/17 at 09:09;  Status DC


Heparin Sodium/ Sodium Chloride 500 ml @  As Directed STK-MED ONCE .ROUTE ;  

Start 9/21/17 at 09:08;  Stop 9/21/17 at 09:09;  Status DC


Heparin Sodium/ Sodium Chloride 1,000 unit 1X  ONCE IART  Last administered on 9 /21/17at 09:59;  Start 9/21/17 at 09:15;  Stop 9/21/17 at 09:16;  Status DC


Lidocaine/Sodium Bicarbonate (Buffered Lidocaine 1%) 20 ml 1X  ONCE IJ  Last 

administered on 9/21/17at 09:15;  Start 9/21/17 at 09:15;  Stop 9/21/17 at 09:16

;  Status DC


Lidocaine (Lidoderm) 1 patch DAILY TD  Last administered on 9/22/17at 10:00;  

Start 9/21/17 at 21:30


Methylprednisolone Sodium Succinate (SOLU-Medrol 125MG VIAL) 250 mg 1X  ONCE IV

  Last administered on 9/22/17at 12:13;  Start 9/22/17 at 12:00;  Stop 9/22/17 

at 12:01;  Status DC


Dihydroergotamine Mesylate 1 mg/ Sodium Chloride 51 ml @  102 mls/hr ONCE  ONCE 

IV  Last administered on 9/23/17at 06:01;  Start 9/23/17 at 06:00;  Stop 9/23/ 17 at 06:29;  Status DC


Dihydroergotamine Mesylate 1 mg/ Sodium Chloride 51 ml @  102 mls/hr ONCE  ONCE 

IV  Last administered on 9/23/17at 13:48;  Start 9/23/17 at 14:00;  Stop 9/23/ 17 at 14:29


Oxycodone HCl (OxyCONTIN) 20 mg Q12HR PO  Last administered on 9/23/17at 11:36;

  Start 9/23/17 at 11:30


Methylprednisolone (Medrol) 4 mg BID PO  Last administered on 9/23/17at 11:36;  

Start 9/23/17 at 11:30





Active Scripts


Active


Reported


Seroquel (Quetiapine Fumarate) 100 Mg Tablet 150 Mg PO PRN QHS PRN


Metoprolol Tartrate 25 Mg Tablet 1 Tab PO QHS


Dihydroergotamine Mesylate 1 Ml Spray.pump 1 Ml NS PRN BID PRN


Hydrocodone-Apap 5-325  ** (Hydrocodone Bit/Acetaminophen) 1 Each Tablet 1 Tab 

PO PRN Q4HRS PRN


Loestrin (Norethindrone A-E Estradiol) 1 Each Tablet 1 Tab PO DAILY


Clonazepam 1 Mg Tablet 1 Mg PO PRN TID PRN


Tizanidine Hcl 4 Mg Tablet 4 Mg PO QID PRN


Seroquel (Quetiapine Fumarate) 300 Mg Tablet 300 Mg PO HS


Marinol (Dronabinol) 5 Mg Capsule 5 Mg PO PRN TID PRN


Nifedipine Er (Nifedipine) 30 Mg Tab.er.24 1 Tab PO HS


Benadryl (Diphenhydramine Hcl) 25 Mg Capsule 2 Cap PO PRN Q6-8HRS PRN


Zofran (Ondansetron Hcl) 8 Mg Tablet 8 Mg PO BID PRN


Vitals/I & O





Vital Sign - Last 24 Hours








 9/22/17 9/22/17 9/22/17 9/22/17





 14:24 15:00 18:20 18:50


 


Temp  97.4  





  97.4  


 


Pulse  85  


 


Resp  18  


 


B/P (MAP)  130/74 (92)  


 


Pulse Ox 95 96 96 96


 


O2 Delivery Room Air Room Air Room Air Room Air


 


    





    





 9/22/17 9/22/17 9/22/17 9/22/17





 19:29 20:00 21:53 23:57


 


Temp 98.5   98.3





 98.5   98.3


 


Pulse 93  93 87


 


Resp 18   18


 


B/P (MAP) 141/88 (105)  141/88 148/80 (102)


 


Pulse Ox 97   93


 


O2 Delivery Room Air Room Air  Room Air


 


    





    





 9/23/17 9/23/17 9/23/17 9/23/17





 03:22 03:46 07:00 08:00


 


Temp   97.5 





   97.5 


 


Pulse   72 


 


Resp  16 18 


 


B/P (MAP)   135/76 (95) 


 


Pulse Ox   95 


 


O2 Delivery Room Air Room Air Room Air Room Air


 


    





    





 9/23/17 9/23/17 9/23/17 9/23/17





 08:54 09:54 11:00 11:36


 


Temp   97.7 





   97.7 


 


Pulse   91 


 


Resp 16 16 18 16


 


B/P (MAP)   111/65 (80) 


 


Pulse Ox   96 96


 


O2 Delivery   Room Air Room Air














Intake and Output   


 


 9/23/17 9/23/17 9/24/17





 15:00 23:00 07:00


 


Intake Total 300 ml  


 


Balance 300 ml  

















JAGDEEP WEAVER MD Sep 23, 2017 14:02

## 2017-09-23 NOTE — PDOC
PROGRESS NOTES


Assessment


Intractable migraine headache, status migrainosus, better


History of hypercoaguable state


Plan


One more dose of DHE due


Received a single dose of IV Solu-Medrol, hold on repeating


Dilaudid and hydrocodone ordered.


Hold on narcotic PCA continuously


Hold on MR venogram.


Subjective


headache 7/10


Objective





Vital Signs








  Date Time  Temp Pulse Resp B/P (MAP) Pulse Ox O2 Delivery O2 Flow Rate FiO2


 


9/23/17 11:36   16  96 Room Air  


 


9/23/17 11:00 97.7 91  111/65 (80)    





 97.7       














Intake and Output 


 


 9/24/17





 07:00


 


Intake Total 300 ml


 


Balance 300 ml


 


 


 


Intake Oral 300 ml








PHYSICAL EXAM


Still has severe headache, 7/10 headache now


Alert. Oriented to time, place and person.


PERRL.


EOMI.


CN: no focal findings.


Muscle tone: normal.


Muscle strength: 5/5


DTR: 2+


Plantar reflex: Flexor


Gait: not examined in bed.


Sensory exam: no abnormal findings.


Review of Relevant


I have reviewed the following items amber (where applicable) has been applied.


Labs





Laboratory Tests








Test


  9/22/17


04:25


 


White Blood Count


  9.1 x10^3/uL


(4.0-11.0)


 


Red Blood Count


  4.49 x10^6/uL


(3.50-5.40)


 


Hemoglobin


  13.2 g/dL


(12.0-15.5)


 


Hematocrit


  38.1 %


(36.0-47.0)


 


Mean Corpuscular Volume 85 fL () 


 


Mean Corpuscular Hemoglobin 30 pg (25-35) 


 


Mean Corpuscular Hemoglobin


Concent 35 g/dL


(31-37)


 


Red Cell Distribution Width


  13.1 %


(11.5-14.5)


 


Platelet Count


  227 x10^3/uL


(140-400)


 


Neutrophils (%) (Auto) 51 % (31-73) 


 


Lymphocytes (%) (Auto) 38 % (24-48) 


 


Monocytes (%) (Auto) 9 % (0-9) 


 


Eosinophils (%) (Auto) 2 % (0-3) 


 


Basophils (%) (Auto) 1 % (0-3) 


 


Neutrophils # (Auto)


  4.6 x10^3uL


(1.8-7.7)


 


Lymphocytes # (Auto)


  3.4 x10^3/uL


(1.0-4.8)


 


Monocytes # (Auto)


  0.8 x10^3/uL


(0.0-1.1)


 


Eosinophils # (Auto)


  0.2 x10^3/uL


(0.0-0.7)


 


Basophils # (Auto)


  0.0 x10^3/uL


(0.0-0.2)


 


Sodium Level


  140 mmol/L


(136-145)


 


Potassium Level


  3.4 mmol/L


(3.5-5.1)


 


Chloride Level


  105 mmol/L


()


 


Carbon Dioxide Level


  25 mmol/L


(21-32)


 


Anion Gap 10 (6-14) 


 


Blood Urea Nitrogen


  14 mg/dL


(7-20)


 


Creatinine


  0.7 mg/dL


(0.6-1.0)


 


Estimated GFR


(Cockcroft-Gault) 94.2 


 


 


BUN/Creatinine Ratio 20 (6-20) 


 


Glucose Level


  108 mg/dL


(70-99)


 


Calcium Level


  8.4 mg/dL


(8.5-10.1)


 


Total Bilirubin


  0.1 mg/dL


(0.2-1.0)


 


Aspartate Amino Transf


(AST/SGOT) 7 U/L (15-37) 


 


 


Alanine Aminotransferase


(ALT/SGPT) 31 U/L (14-59) 


 


 


Alkaline Phosphatase


  56 U/L


()


 


Total Protein


  6.4 g/dL


(6.4-8.2)


 


Albumin


  2.9 g/dL


(3.4-5.0)


 


Albumin/Globulin Ratio 0.8 (1.0-1.7) 








Medications





Current Medications


Acetaminophen (Tylenol) 650 mg PRN Q6HRS  PRN PO FEVER;  Start 9/19/17 at 17:15


Ondansetron HCl (Zofran) 4 mg PRN Q6HRS  PRN IV NAUSEA/VOMITING;  Start 9/19/17 

at 17:15


Morphine Sulfate 2 mg PRN Q2HR  PRN IV PAIN;  Start 9/19/17 at 17:15


Tramadol HCl (Ultram) 50 mg PRN Q6HRS  PRN PO PAIN Last administered on 9/22/ 17at 21:49;  Start 9/19/17 at 17:15;  Stop 9/23/17 at 11:25;  Status DC


Hydralazine HCl (Apresoline) 10 mg PRN Q4HRS  PRN IVP ELEVATED BP, SEE COMMENTS

;  Start 9/19/17 at 17:15


Docusate Sodium (Colace) 100 mg PRN DAILY  PRN PO CONSTIPATION Last 

administered on 9/21/17at 04:46;  Start 9/19/17 at 17:15


Hydromorphone HCl (Dilaudid) 2 mg PRN Q4HRS  PRN IV PAIN Last administered on 9/ 22/17at 18:20;  Start 9/19/17 at 17:15


Oxycodone/ Acetaminophen (Percocet 10/325) 1 tab PRN Q4HRS  PRN PO MODERATE TO 

SEVERE PAIN Last administered on 9/23/17at 08:54;  Start 9/19/17 at 17:15


Enoxaparin Sodium (Lovenox 40mg Syringe) 40 mg Q24H SQ  Last administered on 9/ 22/17at 18:21;  Start 9/19/17 at 18:00


Sumatriptan Succinate (Imitrex) 50 mg PRN Q2HR  PRN PO MIGRAINE HEADACHE Last 

administered on 9/20/17at 00:20;  Start 9/19/17 at 17:30;  Stop 9/20/17 at 08:46

;  Status DC


Clonazepam (KlonoPIN) 1 mg PRN TID  PRN PO ANXIETY Last administered on 9/21/ 17at 12:13;  Start 9/19/17 at 17:30


Diphenhydramine HCl (Benadryl) 50 mg PRN Q6HRS  PRN PO HEADACHE Last 

administered on 9/20/17at 00:20;  Start 9/19/17 at 17:30


Acetaminophen/ Hydrocodone Bitart (Lortab 5/325) 1 tab PRN Q4HRS  PRN PO MILD 

TO MODERATE PAIN Last administered on 9/23/17at 03:46;  Start 9/19/17 at 17:30


Metoprolol Tartrate (Lopressor) 25 mg QHS PO  Last administered on 9/22/17at 21:

53;  Start 9/19/17 at 21:00


Tizanidine HCl (Zanaflex) 4 mg PRN QID  PRN PO MUSCLE SPASMS Last administered 

on 9/23/17at 11:36;  Start 9/19/17 at 17:30


Non-Formulary Medication 1 ml PRN BID  PRN NS MIGRAINE HEADACHE;  Start 9/19/17 

at 17:30;  Stop 9/20/17 at 08:46;  Status DC


Dronabinol (Marinol) 5 mg TIDAC PO  Last administered on 9/23/17at 11:36;  

Start 9/20/17 at 07:30


Non-Formulary Medication 1 tab DAILY PO  Last administered on 9/23/17at 08:57;  

Start 9/21/17 at 09:00


Ondansetron HCl (Zofran Odt) 8 mg PRN BID  PRN PO NAUSEA/VOMITING Last 

administered on 9/23/17at 03:53;  Start 9/19/17 at 17:45


Quetiapine Fumarate (SEROquel XR) 300 mg QHS PO  Last administered on 9/22/17at 

22:09;  Start 9/19/17 at 21:00


Ketorolac Tromethamine (Toradol) 15 mg PRN Q6HRS  PRN IV PAIN Last administered 

on 9/20/17at 02:37;  Start 9/19/17 at 17:30;  Stop 9/20/17 at 08:46;  Status DC


Diphenhydramine HCl (Benadryl) 25 mg PRN Q6HRS  PRN IVP ITCHING Last 

administered on 9/23/17at 03:46;  Start 9/19/17 at 17:30


Metoclopramide HCl (Reglan) 10 mg PRN Q6HRS  PRN IV NAUSEA/VOMITING Last 

administered on 9/20/17at 06:10;  Start 9/19/17 at 17:30;  Stop 9/20/17 at 08:46

;  Status DC


Gadobutrol (Gadavist) 7.5 mmol 1X  ONCE IV  Last administered on 9/19/17at 20:36

;  Start 9/19/17 at 20:15;  Stop 9/19/17 at 20:18;  Status DC


Metoclopramide HCl (Reglan) 10 mg Q8HRS IV  Last administered on 9/23/17at 13:47

;  Start 9/20/17 at 08:45


Dihydroergotamine Mesylate 1 mg/ Sodium Chloride 51 ml @  102 mls/hr Q8HRS IV  

Last administered on 9/22/17at 21:48;  Start 9/20/17 at 09:00;  Stop 9/22/17 at 

22:29;  Status DC


Influenza Virus Vaccine Quadrival (Fluarix Quad [id number] Syringe) 0.5 ml ONCE 

ONCE VAX IM  Last administered on 9/20/17at 13:45;  Start 9/20/17 at 13:45;  

Stop 9/20/17 at 13:46;  Status DC


Quetiapine Fumarate (SEROquel) 150 mg PRN QHS  PRN PO ANXIETY Last administered 

on 9/22/17at 23:15;  Start 9/20/17 at 22:15


Non-Formulary Medication 1 ea PRN DAILY  PRN PO SEE PACKAGE DIRECTIONS;  Start 9 /21/17 at 07:00;  Stop 9/22/17 at 13:00;  Status DC


Lidocaine/Sodium Bicarbonate (Buffered Lidocaine 1%) 20 ml STK-MED ONCE IJ ;  

Start 9/21/17 at 09:08;  Stop 9/21/17 at 09:09;  Status DC


Heparin Sodium/ Sodium Chloride 500 ml @  As Directed STK-MED ONCE .ROUTE ;  

Start 9/21/17 at 09:08;  Stop 9/21/17 at 09:09;  Status DC


Heparin Sodium/ Sodium Chloride 1,000 unit 1X  ONCE IART  Last administered on 9 /21/17at 09:59;  Start 9/21/17 at 09:15;  Stop 9/21/17 at 09:16;  Status DC


Lidocaine/Sodium Bicarbonate (Buffered Lidocaine 1%) 20 ml 1X  ONCE IJ  Last 

administered on 9/21/17at 09:15;  Start 9/21/17 at 09:15;  Stop 9/21/17 at 09:16

;  Status DC


Lidocaine (Lidoderm) 1 patch DAILY TD  Last administered on 9/22/17at 10:00;  

Start 9/21/17 at 21:30


Methylprednisolone Sodium Succinate (SOLU-Medrol 125MG VIAL) 250 mg 1X  ONCE IV

  Last administered on 9/22/17at 12:13;  Start 9/22/17 at 12:00;  Stop 9/22/17 

at 12:01;  Status DC


Dihydroergotamine Mesylate 1 mg/ Sodium Chloride 51 ml @  102 mls/hr ONCE  ONCE 

IV  Last administered on 9/23/17at 06:01;  Start 9/23/17 at 06:00;  Stop 9/23/ 17 at 06:29;  Status DC


Dihydroergotamine Mesylate 1 mg/ Sodium Chloride 51 ml @  102 mls/hr ONCE  ONCE 

IV  Last administered on 9/23/17at 13:48;  Start 9/23/17 at 14:00;  Stop 9/23/ 17 at 14:29


Oxycodone HCl (OxyCONTIN) 20 mg Q12HR PO  Last administered on 9/23/17at 11:36;

  Start 9/23/17 at 11:30


Methylprednisolone (Medrol) 4 mg BID PO  Last administered on 9/23/17at 11:36;  

Start 9/23/17 at 11:30





Active Scripts


Active


Reported


Seroquel (Quetiapine Fumarate) 100 Mg Tablet 150 Mg PO PRN QHS PRN


Metoprolol Tartrate 25 Mg Tablet 1 Tab PO QHS


Dihydroergotamine Mesylate 1 Ml Spray.pump 1 Ml NS PRN BID PRN


Hydrocodone-Apap 5-325  ** (Hydrocodone Bit/Acetaminophen) 1 Each Tablet 1 Tab 

PO PRN Q4HRS PRN


Loestrin (Norethindrone A-E Estradiol) 1 Each Tablet 1 Tab PO DAILY


Clonazepam 1 Mg Tablet 1 Mg PO PRN TID PRN


Tizanidine Hcl 4 Mg Tablet 4 Mg PO QID PRN


Seroquel (Quetiapine Fumarate) 300 Mg Tablet 300 Mg PO HS


Marinol (Dronabinol) 5 Mg Capsule 5 Mg PO PRN TID PRN


Nifedipine Er (Nifedipine) 30 Mg Tab.er.24 1 Tab PO HS


Benadryl (Diphenhydramine Hcl) 25 Mg Capsule 2 Cap PO PRN Q6-8HRS PRN


Zofran (Ondansetron Hcl) 8 Mg Tablet 8 Mg PO BID PRN


Vitals/I & O





Vital Sign - Last 24 Hours








 9/22/17 9/22/17 9/22/17 9/22/17





 15:00 18:20 18:50 19:29


 


Temp 97.4   98.5





 97.4   98.5


 


Pulse 85   93


 


Resp 18   18


 


B/P (MAP) 130/74 (92)   141/88 (105)


 


Pulse Ox 96 96 96 97


 


O2 Delivery Room Air Room Air Room Air Room Air


 


    





    





 9/22/17 9/22/17 9/22/17 9/23/17





 20:00 21:53 23:57 03:22


 


Temp   98.3 





   98.3 


 


Pulse  93 87 


 


Resp   18 


 


B/P (MAP)  141/88 148/80 (102) 


 


Pulse Ox   93 


 


O2 Delivery Room Air  Room Air Room Air


 


    





    





 9/23/17 9/23/17 9/23/17 9/23/17





 03:46 07:00 08:00 08:54


 


Temp  97.5  





  97.5  


 


Pulse  72  


 


Resp 16 18  16


 


B/P (MAP)  135/76 (95)  


 


Pulse Ox  95  


 


O2 Delivery Room Air Room Air Room Air 


 


    





    





 9/23/17 9/23/17 9/23/17 





 09:54 11:00 11:36 


 


Temp  97.7  





  97.7  


 


Pulse  91  


 


Resp 16 18 16 


 


B/P (MAP)  111/65 (80)  


 


Pulse Ox  96 96 


 


O2 Delivery  Room Air Room Air 














Intake and Output   


 


 9/23/17 9/23/17 9/24/17





 15:00 23:00 07:00


 


Intake Total 300 ml  


 


Balance 300 ml  

















JUAN C BILLS MD Sep 23, 2017 14:30

## 2017-09-24 NOTE — PDOC
PROGRESS NOTES


Assessment


Intractable migraine headache, status migrainosus, better this morning, worse 

after a nap this afternoon


History of hypercoaguable state


Plan


Finished with DHE


Received a single dose of IV Solu-Medrol, hold on repeating


Dilaudid and hydrocodone ordered.


Hold on narcotic PCA continuously


Hold on MR venogram.


Nothing to be gained from further hospital stay


Next year, I reassured patient, new anti-migraine therapy will be available (

CGRP agents, nerve stimulators)


Subjective


headache was better, worse after a nap


Objective





Vital Signs








  Date Time  Temp Pulse Resp B/P (MAP) Pulse Ox O2 Delivery O2 Flow Rate FiO2


 


9/24/17 12:50   16  96 Room Air  


 


9/24/17 08:11 98.1 66  115/78 (90)    





 98.1       














Intake and Output 


 


 9/25/17





 07:00


 


Intake Total 300 ml


 


Balance 300 ml


 


 


 


Intake Oral 300 ml








PHYSICAL EXAM


Still has severe headache, 7/10 headache now


Alert. Oriented to time, place and person.


PERRL.


EOMI.


CN: no focal findings.


Muscle tone: normal.


Muscle strength: 5/5


DTR: 2+


Plantar reflex: Flexor


Gait: not examined in bed.


Sensory exam: no abnormal findings.


Review of Relevant


I have reviewed the following items amber (where applicable) has been applied.


Medications





Current Medications


Acetaminophen (Tylenol) 650 mg PRN Q6HRS  PRN PO FEVER;  Start 9/19/17 at 17:15


Ondansetron HCl (Zofran) 4 mg PRN Q6HRS  PRN IV NAUSEA/VOMITING;  Start 9/19/17 

at 17:15


Morphine Sulfate 2 mg PRN Q2HR  PRN IV PAIN;  Start 9/19/17 at 17:15


Tramadol HCl (Ultram) 50 mg PRN Q6HRS  PRN PO PAIN Last administered on 9/22/ 17at 21:49;  Start 9/19/17 at 17:15;  Stop 9/23/17 at 11:25;  Status DC


Hydralazine HCl (Apresoline) 10 mg PRN Q4HRS  PRN IVP ELEVATED BP, SEE COMMENTS

;  Start 9/19/17 at 17:15


Docusate Sodium (Colace) 100 mg PRN DAILY  PRN PO CONSTIPATION Last 

administered on 9/21/17at 04:46;  Start 9/19/17 at 17:15


Hydromorphone HCl (Dilaudid) 2 mg PRN Q4HRS  PRN IV PAIN Last administered on 9/ 22/17at 18:20;  Start 9/19/17 at 17:15


Oxycodone/ Acetaminophen (Percocet 10/325) 1 tab PRN Q4HRS  PRN PO MODERATE TO 

SEVERE PAIN Last administered on 9/24/17at 11:50;  Start 9/19/17 at 17:15


Enoxaparin Sodium (Lovenox 40mg Syringe) 40 mg Q24H SQ  Last administered on 9/ 22/17at 18:21;  Start 9/19/17 at 18:00


Sumatriptan Succinate (Imitrex) 50 mg PRN Q2HR  PRN PO MIGRAINE HEADACHE Last 

administered on 9/20/17at 00:20;  Start 9/19/17 at 17:30;  Stop 9/20/17 at 08:46

;  Status DC


Clonazepam (KlonoPIN) 1 mg PRN TID  PRN PO ANXIETY Last administered on 9/23/ 17at 22:43;  Start 9/19/17 at 17:30


Diphenhydramine HCl (Benadryl) 50 mg PRN Q6HRS  PRN PO HEADACHE Last 

administered on 9/24/17at 01:21;  Start 9/19/17 at 17:30


Acetaminophen/ Hydrocodone Bitart (Lortab 5/325) 1 tab PRN Q4HRS  PRN PO MILD 

TO MODERATE PAIN Last administered on 9/24/17at 01:24;  Start 9/19/17 at 17:30


Metoprolol Tartrate (Lopressor) 25 mg QHS PO  Last administered on 9/23/17at 21:

10;  Start 9/19/17 at 21:00


Tizanidine HCl (Zanaflex) 4 mg PRN QID  PRN PO MUSCLE SPASMS Last administered 

on 9/24/17at 11:50;  Start 9/19/17 at 17:30


Non-Formulary Medication 1 ml PRN BID  PRN NS MIGRAINE HEADACHE;  Start 9/19/17 

at 17:30;  Stop 9/20/17 at 08:46;  Status DC


Dronabinol (Marinol) 5 mg TIDAC PO  Last administered on 9/24/17at 11:51;  

Start 9/20/17 at 07:30


Non-Formulary Medication 1 tab DAILY PO  Last administered on 9/24/17at 08:25;  

Start 9/21/17 at 09:00


Ondansetron HCl (Zofran Odt) 8 mg PRN BID  PRN PO NAUSEA/VOMITING Last 

administered on 9/23/17at 03:53;  Start 9/19/17 at 17:45


Quetiapine Fumarate (SEROquel XR) 300 mg QHS PO  Last administered on 9/23/17at 

22:43;  Start 9/19/17 at 21:00


Ketorolac Tromethamine (Toradol) 15 mg PRN Q6HRS  PRN IV PAIN Last administered 

on 9/20/17at 02:37;  Start 9/19/17 at 17:30;  Stop 9/20/17 at 08:46;  Status DC


Diphenhydramine HCl (Benadryl) 25 mg PRN Q6HRS  PRN IVP ITCHING Last 

administered on 9/23/17at 03:46;  Start 9/19/17 at 17:30


Metoclopramide HCl (Reglan) 10 mg PRN Q6HRS  PRN IV NAUSEA/VOMITING Last 

administered on 9/20/17at 06:10;  Start 9/19/17 at 17:30;  Stop 9/20/17 at 08:46

;  Status DC


Gadobutrol (Gadavist) 7.5 mmol 1X  ONCE IV  Last administered on 9/19/17at 20:36

;  Start 9/19/17 at 20:15;  Stop 9/19/17 at 20:18;  Status DC


Metoclopramide HCl (Reglan) 10 mg Q8HRS IV  Last administered on 9/24/17at 11:51

;  Start 9/20/17 at 08:45


Dihydroergotamine Mesylate 1 mg/ Sodium Chloride 51 ml @  102 mls/hr Q8HRS IV  

Last administered on 9/22/17at 21:48;  Start 9/20/17 at 09:00;  Stop 9/22/17 at 

22:29;  Status DC


Influenza Virus Vaccine Quadrival (Fluarix Quad [id number] Syringe) 0.5 ml ONCE 

ONCE VAX IM  Last administered on 9/20/17at 13:45;  Start 9/20/17 at 13:45;  

Stop 9/20/17 at 13:46;  Status DC


Quetiapine Fumarate (SEROquel) 150 mg PRN QHS  PRN PO ANXIETY Last administered 

on 9/24/17at 01:21;  Start 9/20/17 at 22:15


Non-Formulary Medication 1 ea PRN DAILY  PRN PO SEE PACKAGE DIRECTIONS;  Start 9 /21/17 at 07:00;  Stop 9/22/17 at 13:00;  Status DC


Lidocaine/Sodium Bicarbonate (Buffered Lidocaine 1%) 20 ml STK-MED ONCE IJ ;  

Start 9/21/17 at 09:08;  Stop 9/21/17 at 09:09;  Status DC


Heparin Sodium/ Sodium Chloride 500 ml @  As Directed STK-MED ONCE .ROUTE ;  

Start 9/21/17 at 09:08;  Stop 9/21/17 at 09:09;  Status DC


Heparin Sodium/ Sodium Chloride 1,000 unit 1X  ONCE IART  Last administered on 9 /21/17at 09:59;  Start 9/21/17 at 09:15;  Stop 9/21/17 at 09:16;  Status DC


Lidocaine/Sodium Bicarbonate (Buffered Lidocaine 1%) 20 ml 1X  ONCE IJ  Last 

administered on 9/21/17at 09:15;  Start 9/21/17 at 09:15;  Stop 9/21/17 at 09:16

;  Status DC


Lidocaine (Lidoderm) 1 patch DAILY TD  Last administered on 9/22/17at 10:00;  

Start 9/21/17 at 21:30


Methylprednisolone Sodium Succinate (SOLU-Medrol 125MG VIAL) 250 mg 1X  ONCE IV

  Last administered on 9/22/17at 12:13;  Start 9/22/17 at 12:00;  Stop 9/22/17 

at 12:01;  Status DC


Dihydroergotamine Mesylate 1 mg/ Sodium Chloride 51 ml @  102 mls/hr ONCE  ONCE 

IV  Last administered on 9/23/17at 06:01;  Start 9/23/17 at 06:00;  Stop 9/23/ 17 at 06:29;  Status DC


Dihydroergotamine Mesylate 1 mg/ Sodium Chloride 51 ml @  102 mls/hr ONCE  ONCE 

IV  Last administered on 9/23/17at 13:48;  Start 9/23/17 at 14:00;  Stop 9/23/ 17 at 14:29;  Status DC


Oxycodone HCl (OxyCONTIN) 20 mg Q12HR PO  Last administered on 9/24/17at 08:20;

  Start 9/23/17 at 11:30


Methylprednisolone (Medrol) 4 mg BID PO  Last administered on 9/24/17at 08:20;  

Start 9/23/17 at 11:30





Active Scripts


Active


Reported


Seroquel (Quetiapine Fumarate) 100 Mg Tablet 150 Mg PO PRN QHS PRN


Metoprolol Tartrate 25 Mg Tablet 1 Tab PO QHS


Dihydroergotamine Mesylate 1 Ml Spray.pump 1 Ml NS PRN BID PRN


Hydrocodone-Apap 5-325  ** (Hydrocodone Bit/Acetaminophen) 1 Each Tablet 1 Tab 

PO PRN Q4HRS PRN


Loestrin (Norethindrone A-E Estradiol) 1 Each Tablet 1 Tab PO DAILY


Clonazepam 1 Mg Tablet 1 Mg PO PRN TID PRN


Tizanidine Hcl 4 Mg Tablet 4 Mg PO QID PRN


Seroquel (Quetiapine Fumarate) 300 Mg Tablet 300 Mg PO HS


Marinol (Dronabinol) 5 Mg Capsule 5 Mg PO PRN TID PRN


Nifedipine Er (Nifedipine) 30 Mg Tab.er.24 1 Tab PO HS


Benadryl (Diphenhydramine Hcl) 25 Mg Capsule 2 Cap PO PRN Q6-8HRS PRN


Zofran (Ondansetron Hcl) 8 Mg Tablet 8 Mg PO BID PRN


Vitals/I & O





Vital Sign - Last 24 Hours








 9/23/17 9/23/17 9/23/17 9/23/17





 15:00 19:37 20:00 21:10


 


Temp 97.8 97.5  





 97.8 97.5  


 


Pulse 65 91  91


 


Resp 18 16  


 


B/P (MAP) 116/75 (89) 106/56 (73)  106/56


 


Pulse Ox 94 92  


 


O2 Delivery Room Air Room Air Room Air 


 


    





    





 9/23/17 9/23/17 9/24/17 9/24/17





 22:35 23:10 01:24 07:00


 


Temp  98.1  98.1





  98.1  98.1


 


Pulse  63  66


 


Resp  16  18


 


B/P (MAP)  131/64 (86)  115/78 (90)


 


Pulse Ox 92 96 92 96


 


O2 Delivery Room Air Room Air Room Air Room Air


 


    





    





 9/24/17 9/24/17 9/24/17 9/24/17





 08:11 08:20 08:20 11:50


 


Temp 98.1   





 98.1   


 


Pulse 66   


 


Resp 18 16  16


 


B/P (MAP) 115/78 (90)   


 


Pulse Ox 96 96  96


 


O2 Delivery Room Air Room Air Room Air Room Air


 


    





    





 9/24/17 9/24/17  





 12:20 12:50  


 


Resp 16 16  


 


Pulse Ox 96 96  


 


O2 Delivery Room Air Room Air  














Intake and Output   


 


 9/24/17 9/24/17 9/25/17





 15:00 23:00 07:00


 


Intake Total 300 ml  


 


Balance 300 ml  

















JUAN C BILLS MD Sep 24, 2017 13:35

## 2017-09-24 NOTE — PDOC
PROGRESS NOTES


Chief Complaint


Chief Complaint


  Intractable  headache 2/2 migraine flare - status migranosis


Chronic migraine headache w. acute flare, 


HTN


OA


obese, BMI 33


coagulopathy NOS





History of Present Illness


History of Present Illness


minimal improved, still marked pain,   DHE infusion stopped   IV solumedrol 

given 2 days ago, cont PO


cont sched Oxycontin,  Neuro following


 


has slept some





less swollen at  PICC line,   on lovenox,  heme consulted for reported 

coagulopathy,  NOS,





Vitals


Vitals





Vital Signs








  Date Time  Temp Pulse Resp B/P (MAP) Pulse Ox O2 Delivery O2 Flow Rate FiO2


 


9/24/17 11:50   16  96 Room Air  


 


9/24/17 08:11 98.1 66  115/78 (90)    





 98.1       











Physical Exam


General:  Alert, Cooperative, mild distress (pain)


Heart:  Regular rate


Lungs:  Clear, Other


Extremities:  No clubbing, No cyanosis





Review of Systems


Review of Systems


neck pain,  nausea and frontal and occipital headachce today





Assessment and Plan


Assessmemt and Plan


cont current


Problems:  





Comment


Review of Relevant


I have reviewed the following items amber (where applicable) has been applied.


Medications





Current Medications


Acetaminophen (Tylenol) 650 mg PRN Q6HRS  PRN PO FEVER;  Start 9/19/17 at 17:15


Ondansetron HCl (Zofran) 4 mg PRN Q6HRS  PRN IV NAUSEA/VOMITING;  Start 9/19/17 

at 17:15


Morphine Sulfate 2 mg PRN Q2HR  PRN IV PAIN;  Start 9/19/17 at 17:15


Tramadol HCl (Ultram) 50 mg PRN Q6HRS  PRN PO PAIN Last administered on 9/22/ 17at 21:49;  Start 9/19/17 at 17:15;  Stop 9/23/17 at 11:25;  Status DC


Hydralazine HCl (Apresoline) 10 mg PRN Q4HRS  PRN IVP ELEVATED BP, SEE COMMENTS

;  Start 9/19/17 at 17:15


Docusate Sodium (Colace) 100 mg PRN DAILY  PRN PO CONSTIPATION Last 

administered on 9/21/17at 04:46;  Start 9/19/17 at 17:15


Hydromorphone HCl (Dilaudid) 2 mg PRN Q4HRS  PRN IV PAIN Last administered on 9/ 22/17at 18:20;  Start 9/19/17 at 17:15


Oxycodone/ Acetaminophen (Percocet 10/325) 1 tab PRN Q4HRS  PRN PO MODERATE TO 

SEVERE PAIN Last administered on 9/24/17at 11:50;  Start 9/19/17 at 17:15


Enoxaparin Sodium (Lovenox 40mg Syringe) 40 mg Q24H SQ  Last administered on 9/ 22/17at 18:21;  Start 9/19/17 at 18:00


Sumatriptan Succinate (Imitrex) 50 mg PRN Q2HR  PRN PO MIGRAINE HEADACHE Last 

administered on 9/20/17at 00:20;  Start 9/19/17 at 17:30;  Stop 9/20/17 at 08:46

;  Status DC


Clonazepam (KlonoPIN) 1 mg PRN TID  PRN PO ANXIETY Last administered on 9/23/ 17at 22:43;  Start 9/19/17 at 17:30


Diphenhydramine HCl (Benadryl) 50 mg PRN Q6HRS  PRN PO HEADACHE Last 

administered on 9/24/17at 01:21;  Start 9/19/17 at 17:30


Acetaminophen/ Hydrocodone Bitart (Lortab 5/325) 1 tab PRN Q4HRS  PRN PO MILD 

TO MODERATE PAIN Last administered on 9/24/17at 01:24;  Start 9/19/17 at 17:30


Metoprolol Tartrate (Lopressor) 25 mg QHS PO  Last administered on 9/23/17at 21:

10;  Start 9/19/17 at 21:00


Tizanidine HCl (Zanaflex) 4 mg PRN QID  PRN PO MUSCLE SPASMS Last administered 

on 9/24/17at 11:50;  Start 9/19/17 at 17:30


Non-Formulary Medication 1 ml PRN BID  PRN NS MIGRAINE HEADACHE;  Start 9/19/17 

at 17:30;  Stop 9/20/17 at 08:46;  Status DC


Dronabinol (Marinol) 5 mg TIDAC PO  Last administered on 9/24/17at 11:51;  

Start 9/20/17 at 07:30


Non-Formulary Medication 1 tab DAILY PO  Last administered on 9/24/17at 08:25;  

Start 9/21/17 at 09:00


Ondansetron HCl (Zofran Odt) 8 mg PRN BID  PRN PO NAUSEA/VOMITING Last 

administered on 9/23/17at 03:53;  Start 9/19/17 at 17:45


Quetiapine Fumarate (SEROquel XR) 300 mg QHS PO  Last administered on 9/23/17at 

22:43;  Start 9/19/17 at 21:00


Ketorolac Tromethamine (Toradol) 15 mg PRN Q6HRS  PRN IV PAIN Last administered 

on 9/20/17at 02:37;  Start 9/19/17 at 17:30;  Stop 9/20/17 at 08:46;  Status DC


Diphenhydramine HCl (Benadryl) 25 mg PRN Q6HRS  PRN IVP ITCHING Last 

administered on 9/23/17at 03:46;  Start 9/19/17 at 17:30


Metoclopramide HCl (Reglan) 10 mg PRN Q6HRS  PRN IV NAUSEA/VOMITING Last 

administered on 9/20/17at 06:10;  Start 9/19/17 at 17:30;  Stop 9/20/17 at 08:46

;  Status DC


Gadobutrol (Gadavist) 7.5 mmol 1X  ONCE IV  Last administered on 9/19/17at 20:36

;  Start 9/19/17 at 20:15;  Stop 9/19/17 at 20:18;  Status DC


Metoclopramide HCl (Reglan) 10 mg Q8HRS IV  Last administered on 9/24/17at 11:51

;  Start 9/20/17 at 08:45


Dihydroergotamine Mesylate 1 mg/ Sodium Chloride 51 ml @  102 mls/hr Q8HRS IV  

Last administered on 9/22/17at 21:48;  Start 9/20/17 at 09:00;  Stop 9/22/17 at 

22:29;  Status DC


Influenza Virus Vaccine Quadrival (Fluarix Quad [id number] Syringe) 0.5 ml ONCE 

ONCE VAX IM  Last administered on 9/20/17at 13:45;  Start 9/20/17 at 13:45;  

Stop 9/20/17 at 13:46;  Status DC


Quetiapine Fumarate (SEROquel) 150 mg PRN QHS  PRN PO ANXIETY Last administered 

on 9/24/17at 01:21;  Start 9/20/17 at 22:15


Non-Formulary Medication 1 ea PRN DAILY  PRN PO SEE PACKAGE DIRECTIONS;  Start 9 /21/17 at 07:00;  Stop 9/22/17 at 13:00;  Status DC


Lidocaine/Sodium Bicarbonate (Buffered Lidocaine 1%) 20 ml STK-MED ONCE IJ ;  

Start 9/21/17 at 09:08;  Stop 9/21/17 at 09:09;  Status DC


Heparin Sodium/ Sodium Chloride 500 ml @  As Directed STK-MED ONCE .ROUTE ;  

Start 9/21/17 at 09:08;  Stop 9/21/17 at 09:09;  Status DC


Heparin Sodium/ Sodium Chloride 1,000 unit 1X  ONCE IART  Last administered on 9 /21/17at 09:59;  Start 9/21/17 at 09:15;  Stop 9/21/17 at 09:16;  Status DC


Lidocaine/Sodium Bicarbonate (Buffered Lidocaine 1%) 20 ml 1X  ONCE IJ  Last 

administered on 9/21/17at 09:15;  Start 9/21/17 at 09:15;  Stop 9/21/17 at 09:16

;  Status DC


Lidocaine (Lidoderm) 1 patch DAILY TD  Last administered on 9/22/17at 10:00;  

Start 9/21/17 at 21:30


Methylprednisolone Sodium Succinate (SOLU-Medrol 125MG VIAL) 250 mg 1X  ONCE IV

  Last administered on 9/22/17at 12:13;  Start 9/22/17 at 12:00;  Stop 9/22/17 

at 12:01;  Status DC


Dihydroergotamine Mesylate 1 mg/ Sodium Chloride 51 ml @  102 mls/hr ONCE  ONCE 

IV  Last administered on 9/23/17at 06:01;  Start 9/23/17 at 06:00;  Stop 9/23/ 17 at 06:29;  Status DC


Dihydroergotamine Mesylate 1 mg/ Sodium Chloride 51 ml @  102 mls/hr ONCE  ONCE 

IV  Last administered on 9/23/17at 13:48;  Start 9/23/17 at 14:00;  Stop 9/23/ 17 at 14:29;  Status DC


Oxycodone HCl (OxyCONTIN) 20 mg Q12HR PO  Last administered on 9/24/17at 08:20;

  Start 9/23/17 at 11:30


Methylprednisolone (Medrol) 4 mg BID PO  Last administered on 9/24/17at 08:20;  

Start 9/23/17 at 11:30





Active Scripts


Active


Reported


Seroquel (Quetiapine Fumarate) 100 Mg Tablet 150 Mg PO PRN QHS PRN


Metoprolol Tartrate 25 Mg Tablet 1 Tab PO QHS


Dihydroergotamine Mesylate 1 Ml Spray.pump 1 Ml NS PRN BID PRN


Hydrocodone-Apap 5-325  ** (Hydrocodone Bit/Acetaminophen) 1 Each Tablet 1 Tab 

PO PRN Q4HRS PRN


Loestrin (Norethindrone A-E Estradiol) 1 Each Tablet 1 Tab PO DAILY


Clonazepam 1 Mg Tablet 1 Mg PO PRN TID PRN


Tizanidine Hcl 4 Mg Tablet 4 Mg PO QID PRN


Seroquel (Quetiapine Fumarate) 300 Mg Tablet 300 Mg PO HS


Marinol (Dronabinol) 5 Mg Capsule 5 Mg PO PRN TID PRN


Nifedipine Er (Nifedipine) 30 Mg Tab.er.24 1 Tab PO HS


Benadryl (Diphenhydramine Hcl) 25 Mg Capsule 2 Cap PO PRN Q6-8HRS PRN


Zofran (Ondansetron Hcl) 8 Mg Tablet 8 Mg PO BID PRN


Vitals/I & O





Vital Sign - Last 24 Hours








 9/23/17 9/23/17 9/23/17 9/23/17





 15:00 15:36 19:37 20:00


 


Temp 97.8  97.5 





 97.8  97.5 


 


Pulse 65  91 


 


Resp 18 16 16 


 


B/P (MAP) 116/75 (89)  106/56 (73) 


 


Pulse Ox 94  92 


 


O2 Delivery Room Air  Room Air Room Air


 


    





    





 9/23/17 9/23/17 9/23/17 9/23/17





 21:10 22:35 23:10 23:57


 


Temp   98.1 





   98.1 


 


Pulse 91  63 


 


Resp   16 


 


B/P (MAP) 106/56  131/64 (86) 


 


Pulse Ox  92 96 92


 


O2 Delivery  Room Air Room Air Room Air


 


    





    





 9/23/17 9/24/17 9/24/17 9/24/17





 23:57 01:24 07:00 08:11


 


Temp   98.1 98.1





   98.1 98.1


 


Pulse   66 66


 


Resp   18 18


 


B/P (MAP)   115/78 (90) 115/78 (90)


 


Pulse Ox 92 92 96 96


 


O2 Delivery Room Air Room Air Room Air Room Air


 


    





    





 9/24/17 9/24/17 9/24/17 





 08:20 08:20 11:50 


 


Resp 16  16 


 


Pulse Ox 96  96 


 


O2 Delivery Room Air Room Air Room Air 














Intake and Output   


 


 9/24/17 9/24/17 9/25/17





 15:00 23:00 07:00


 


Intake Total 300 ml  


 


Balance 300 ml  

















JAGDEEP WEAVER MD Sep 24, 2017 13:17

## 2017-10-09 NOTE — RAD
MRI Thoracic Spine without and with contrast

 

History: Worsening back pain and spasms, paresthesia

 

Technique: Multiplanar, multi sequential pre and postcontrast MR imaging 

was performed of the thoracic spine.

 

Contrast: 7.5 cc Gadavist

 

Comparison: None

 

Findings: 

There is motion degradation. There is multilevel prominence of the central

canal of the cord such as T6-T7 through T11-12. Accurate evaluation for 

other thoracic cord signal abnormality is limited due to motion artifact. 

There is no significant cord expansion. Thoracic vertebral body stature 

and AP alignment are maintained. Intervertebral disc spaces are adequate. 

There is no significant thoracic spinal stenosis or neural foramina 

compromise at any level. There is no focal posterior disc abnormality of 

the thoracic spine.

 

Impression: 

 

1.  There is no significant thoracic spinal stenosis or neural foramina 

compromise.

2. 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 

cord expansion or enhancement.

 

Electronically signed by: Jefferson Francis MD (10/9/2017 4:13 PM) 

[id number]

## 2017-10-23 ENCOUNTER — HOSPITAL ENCOUNTER (OUTPATIENT)
Dept: HOSPITAL 63 - RAD | Age: 37
Discharge: HOME | End: 2017-10-23
Attending: NURSE PRACTITIONER
Payer: COMMERCIAL

## 2017-10-23 DIAGNOSIS — M47.892: Primary | ICD-10-CM

## 2017-10-23 DIAGNOSIS — M79.641: ICD-10-CM

## 2017-10-23 DIAGNOSIS — M25.562: ICD-10-CM

## 2017-10-23 DIAGNOSIS — M25.552: ICD-10-CM

## 2017-10-23 DIAGNOSIS — M25.551: ICD-10-CM

## 2017-10-23 DIAGNOSIS — M25.561: ICD-10-CM

## 2017-10-23 PROCEDURE — 73521 X-RAY EXAM HIPS BI 2 VIEWS: CPT

## 2017-10-23 PROCEDURE — 72040 X-RAY EXAM NECK SPINE 2-3 VW: CPT

## 2017-10-23 PROCEDURE — 73562 X-RAY EXAM OF KNEE 3: CPT

## 2017-10-23 PROCEDURE — 73130 X-RAY EXAM OF HAND: CPT
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
## 2019-06-01 NOTE — PHYS DOC
Past Medical History


Past Medical History:  No Pertinent History


Additional Past Medical Histor:  RA


Past Surgical History:  No Surgical History


Alcohol Use:  Heavy


Drug Use:  Benzodiazepine, Opiates





Adult General


Chief Complaint


Chief Complaint:  OVERDOSE





HPI


HPI





Patient is a 39  year old [f__sex] who presents with []





Review of Systems


Review of Systems





Constitutional: Denies fever or chills []


Eyes: Denies change in visual acuity, redness, or eye pain []


HENT: Denies nasal congestion or sore throat []


Respiratory: Denies cough or shortness of breath []


Cardiovascular: No additional information not addressed in HPI []


GI: Denies abdominal pain, nausea, vomiting, bloody stools or diarrhea []


: Denies dysuria or hematuria []


Musculoskeletal: Denies back pain or joint pain []


Integument: Denies rash or skin lesions []


Neurologic: Denies headache, focal weakness or sensory changes []


Endocrine: Denies polyuria or polydipsia []





All other systems were reviewed and found to be within normal limits, except as 

documented in this note.





Current Medications


Current Medications





Current Medications








 Medications


  (Trade)  Dose


 Ordered  Sig/Lj  Start Time


 Stop Time Status Last Admin


Dose Admin


 


 Multivitamins 10


 ml/Thiamine HCl


 100 mg/Folic Acid


 1 mg/Sodium


 Chloride  1,011.2 ml


  @ 1,000 mls/


 hr  Q1H  6/1/19 20:30


 6/1/19 20:47 DC 6/1/19 20:30


1,000 MLS/HR


 


 Potassium Chloride


  (Klor-Con)  40 meq  1X  ONCE  6/1/19 22:15


 6/1/19 22:16 DC 6/1/19 22:15


40 MEQ


 


 Sodium Chloride  1,000 ml @ 


 1,000 mls/hr  1X  ONCE  6/1/19 20:30


 6/1/19 21:29 DC 6/1/19 20:34


1,000 MLS/HR











Allergies


Allergies





Allergies








Coded Allergies Type Severity Reaction Last Updated Verified


 


  benztropine Allergy Intermediate SEVERE PAIN 7/10/16 Yes


 


  dexamethasone Allergy Intermediate SEVERE PAIN 7/10/16 Yes











Physical Exam


Physical Exam





Constitutional: Well developed, well nourished, no acute distress, non-toxic 

appearance. []


HENT: Normocephalic, atraumatic, bilateral external ears normal, oropharynx 

moist, no oral exudates, nose normal. []


Eyes: PERRLA, EOMI, conjunctiva normal, no discharge. [] 


Neck: Normal range of motion, no tenderness, supple, no stridor. [] 


Cardiovascular:Heart rate regular rhythm, no murmur []


Lungs & Thorax:  Bilateral breath sounds clear to auscultation []


Abdomen: Bowel sounds normal, soft, no tenderness, no masses, no pulsatile 

masses. [] 


Skin: Warm, dry, no erythema, no rash. [] 


Back: No tenderness, no CVA tenderness. [] 


Extremities: No tenderness, no cyanosis, no clubbing, ROM intact, no edema. [] 


Neurologic: Alert and oriented X 3, normal motor function, normal sensory 

function, no focal deficits noted. []


Psychologic: Affect normal, judgement normal, mood normal. []





Current Patient Data


Vital Signs





                                   Vital Signs








  Date Time  Temp Pulse Resp B/P (MAP) Pulse Ox O2 Delivery O2 Flow Rate FiO2


 


6/2/19 00:46  77 16 131/72 (91) 100 Room Air  


 


6/1/19 20:11 97.8       





 97.8       








Lab Values





                                Laboratory Tests








Test


 6/1/19


20:30 6/1/19


21:15


 


White Blood Count


 7.1 x10^3/uL


(4.0-11.0) 





 


Red Blood Count


 4.44 x10^6/uL


(3.50-5.40) 





 


Hemoglobin


 12.9 g/dL


(12.0-15.5) 





 


Hematocrit


 38.6 %


(36.0-47.0) 





 


Mean Corpuscular Volume


 87 fL ()


 





 


Mean Corpuscular Hemoglobin 29 pg (25-35)   


 


Mean Corpuscular Hemoglobin


Concent 34 g/dL


(31-37) 





 


Red Cell Distribution Width


 13.6 %


(11.5-14.5) 





 


Platelet Count


 294 x10^3/uL


(140-400) 





 


Neutrophils (%) (Auto) 69 % (31-73)   


 


Lymphocytes (%) (Auto) 22 % (24-48)  L 


 


Monocytes (%) (Auto) 7 % (0-9)   


 


Eosinophils (%) (Auto) 1 % (0-3)   


 


Basophils (%) (Auto) 1 % (0-3)   


 


Neutrophils # (Auto)


 5.0 x10^3uL


(1.8-7.7) 





 


Lymphocytes # (Auto)


 1.6 x10^3/uL


(1.0-4.8) 





 


Monocytes # (Auto)


 0.5 x10^3/uL


(0.0-1.1) 





 


Eosinophils # (Auto)


 0.0 x10^3/uL


(0.0-0.7) 





 


Basophils # (Auto)


 0.1 x10^3/uL


(0.0-0.2) 





 


Prothrombin Time


 13.3 SEC


(11.7-14.0) 





 


Prothrombin Time INR 1.0 (0.8-1.1)   


 


PTT


 22 SEC (24-38)


L 





 


Sodium Level


 142 mmol/L


(136-145) 





 


Potassium Level


 3.3 mmol/L


(3.5-5.1)  L 





 


Chloride Level


 103 mmol/L


() 





 


Carbon Dioxide Level


 21 mmol/L


(21-32) 





 


Anion Gap 18 (6-14)  H 


 


Blood Urea Nitrogen


 16 mg/dL


(7-20) 





 


Creatinine


 1.4 mg/dL


(0.6-1.0)  H 





 


Estimated GFR


(Cockcroft-Gault) 41.9  


 





 


BUN/Creatinine Ratio 11 (6-20)   


 


Glucose Level


 146 mg/dL


(70-99)  H 





 


Calcium Level


 9.2 mg/dL


(8.5-10.1) 





 


Magnesium Level


 1.9 mg/dL


(1.8-2.4) 





 


Total Bilirubin


 0.2 mg/dL


(0.2-1.0) 





 


Aspartate Amino Transferase


(AST) 21 U/L (15-37)


 





 


Alanine Aminotransferase (ALT)


 36 U/L (14-59)


 





 


Alkaline Phosphatase


 45 U/L


()  L 





 


Total Protein


 6.9 g/dL


(6.4-8.2) 





 


Albumin


 3.5 g/dL


(3.4-5.0) 





 


Albumin/Globulin Ratio 1.0 (1.0-1.7)   


 


Lipase


 84 U/L


() 





 


Salicylates Level


 4.7 mg/dL


(2.8-20.0) 





 


Salicylate Last Dose Date    


 


Salicylate Last Dose Time    


 


Acetaminophen Level


 < 2 mcg/ml


(10-30)  L 





 


Acetaminophen Last Dose Date    


 


Acetaminophen Last Dose Time    


 


Ethyl Alcohol Level


 69 mg/dL


(0-10)  H 





 


Urine Collection Type  Unknown  


 


Urine Color  Yellow  


 


Urine Clarity  Clear  


 


Urine pH  5.5  


 


Urine Specific Gravity  >=1.030  


 


Urine Protein


 


 30 mg/dL


(NEG-TRACE)


 


Urine Glucose (UA)


 


 Negative mg/dL


(NEG)


 


Urine Ketones (Stick)


 


 15 mg/dL (NEG)





 


Urine Blood


 


 Negative (NEG)





 


Urine Nitrite


 


 Negative (NEG)





 


Urine Bilirubin  Small (NEG)  


 


Urine Urobilinogen Dipstick


 


 0.2 mg/dL (0.2


mg/dL)


 


Urine Leukocyte Esterase


 


 Negative (NEG)





 


Urine RBC  0 /HPF (0-2)  


 


Urine WBC  0 /HPF (0-4)  


 


Urine Squamous Epithelial


Cells 


 Mod /LPF  





 


Urine Bacteria


 


 Few /HPF


(0-FEW)


 


Urine Mucus  Mod /LPF  


 


Urine Opiates Screen  Neg (NEG)  


 


Urine Methadone Screen  Neg (NEG)  


 


Urine Barbiturates  Neg (NEG)  


 


Urine Phencyclidine Screen  Neg (NEG)  


 


Urine


Amphetamine/Methamphetamine 


 Neg (NEG)  





 


Urine Benzodiazepines Screen  Pos (NEG)  


 


Urine Cocaine Screen  Neg (NEG)  


 


Urine Cannabinoids Screen  Neg (NEG)  


 


Urine Ethyl Alcohol  Pos (NEG)  





                                Laboratory Tests


6/1/19 20:30








                                Laboratory Tests


6/1/19 20:30














EKG


EKG


@2023 NSR at 70bpm, NO ST elevation, q wave in III and t wave inversion V1-V2.





Radiology/Procedures


Radiology/Procedures


[]





Course & Med Decision Making


Course & Med Decision Making


Pertinent Labs and Imaging studies reviewed. (See chart for details)





[]





Dragon Disclaimer


Dragon Disclaimer


This electronic medical record was generated, in whole or in part, using a voice

 recognition dictation system.





Departure


Departure


Impression:  


   Primary Impression:  


   Observation following alleged suicide attempt


   Additional Impressions:  


   Medication overdose


   Hypokalemia


   Contusion


Disposition:  01 HOME, SELF-CARE


Condition:  IMPROVED


Referrals:  


BENNIE PASCUAL MD (PCP)


Patient Instructions:  Contusion, Easy-to-Read, Hypokalemia-Brief, Overdose, 

Adult, Potassium Content of Foods, RICE - Routine Care for Injuries, Easy-to-[place]

d, Suicidal Feelings, How to Help Yourself





Additional Instructions:  


Use over the counter Tylenol and Ibuprofen for pain or discomfort.





Problem Qualifiers








   Additional Impressions:  


   Medication overdose


   Encounter type:  initial encounter  Injury intent:  undetermined intent  Q

   ualified Codes:  T50.904A - Poisoning by unspecified drugs, medicaments and 

   biological substances, undetermined, initial encounter


   Contusion


   Encounter type:  initial encounter  Contusion area:  knee  Laterality:  left 

    Qualified Codes:  S80.02XA - Contusion of left knee, initial encounter








MARISA GARCIA DO              Jun 1, 2019 23:33

## 2019-06-02 NOTE — RAD
Examination: 3 views of the left knee



History: History of knee pain



Comparison: None available





Findings:



The alignment of knee joint grossly appears unremarkable.  There is no acute

fracture or dislocation identified.



Impression:



No acute osseous findings.

## 2019-06-02 NOTE — EKG
[facility]

              [address] [place] [address]

Test Date:    2019               Test Time:    20:23:38

Pat Name:     GOLD SQUIRES         Department:   

Patient ID:   [id number]           Room:          

Gender:       F                        Technician:   

:          1980               Requested By: MARISA GARCIA

Order Number: [id number]           Reading MD:     

                                 Measurements

Intervals                              Axis          

Rate:         70                       P:            39

OR:           146                      QRS:          28

QRSD:         84                       T:            28

QT:           488                                    

QTc:          530                                    

                           Interpretive Statements

SINUS RHYTHM

NON SPECIFIC T ABNORMALITY

PROLONGED QT

BORDERLINE ECG

No previous ECG available for comparison

## 2019-06-02 NOTE — RAD
Examination: 3 views of the left foot



History: History of pain



Comparison: None available.



Findings:



The alignment of the tarsal bones, tarsometatarsal joints, metatarsophalangeal

, interphalangeal joints grossly appears unremarkable. There is no acute

fracture or dislocation identified.



Impression:  no acute osseous findings.

## 2019-06-21 NOTE — RAD
PA and lateral chest.

 

HISTORY: Chest tightness

 

PA and lateral views were taken of the chest. Lungs are clear. Heart is 

normal in size without heart failure. There is no pleural effusion.

 

IMPRESSION:

1. No acute chest disease.

 

Electronically signed by: Aries Cummins MD (6/21/2019 11:33 PM) [facility]

## 2019-06-22 VITALS — DIASTOLIC BLOOD PRESSURE: 83 MMHG | SYSTOLIC BLOOD PRESSURE: 170 MMHG

## 2019-06-22 NOTE — EKG
[facility]

              [address] [place] [address]

Test Date:    2019               Test Time:    22:46:42

Pat Name:     GOLD SQUIRES         Department:   

Patient ID:   [id number]           Room:          

Gender:       F                        Technician:   

:          1980               Requested By: MARK ANTHONY GONZALEZ

Order Number: [id number]           Reading MD:     

                                 Measurements

Intervals                              Axis          

Rate:         74                       P:            49

CO:           144                      QRS:          23

QRSD:         94                       T:            12

QT:           464                                    

QTc:          521                                    

                           Interpretive Statements

SINUS RHYTHM

QRS(T) CONTOUR ABNORMALITY

CONSIDER ANTEROLATERAL MYOCARDIAL DAMAGE

T ABNORMALITY IN ANTERIOR LEADS

PROLONGED QT

ABNORMAL ECG

RI6.01          Unconfirmed report

No previous ECG available for comparison

## 2019-06-22 NOTE — PHYS DOC
Past Medical History


Past Medical History:  Arthritis, Migraines


Additional Past Medical Histor:  RA


Past Surgical History:  Cholecystectomy


Alcohol Use:  Rarely


Drug Use:  None


Social History Narrative:  denies





Adult General


Chief Complaint


Chief Complaint:  HYPERTENSION





HPI


HPI





39-year-old female presents to ER via POV for complaints of headache and 

uncontrollable body tremors/shaking. She reports hx of Migraines. She denies 

this is the worst HA she has experienced. 





Patient reports that she had an hCG quantitative of approximately 800 a couple 

weeks ago but states she has had some cramping and vaginal spotting.





Review of Systems


Review of Systems





Constitutional: Denies fever or chills []


Eyes: Denies change in visual acuity, redness, or eye pain []


HENT: Denies nasal congestion or sore throat []


Respiratory: Denies cough or shortness of breath []


Cardiovascular: No additional information not addressed in HPI []


GI: Denies abdominal pain, nausea, vomiting, bloody stools or diarrhea []


: Denies dysuria or hematuria []


Musculoskeletal: Denies back pain or joint pain []


Integument: Denies rash or skin lesions []


Neurologic: Denies headache, focal weakness or sensory changes []


Endocrine: Denies polyuria or polydipsia []





All other systems were reviewed and found to be within normal limits, except as 

documented in this note.





Current Medications


Current Medications





Current Medications








 Medications


  (Trade)  Dose


 Ordered  Sig/Lj  Start Time


 Stop Time Status Last Admin


Dose Admin


 


 Diphenhydramine


 HCl


  (Benadryl)  25 mg  1X  ONCE  6/22/19 00:00


 6/22/19 00:01 DC 6/22/19 00:00


25 MG


 


 Ketorolac


 Tromethamine


  (Toradol 15mg


 Vial)  15 mg  1X  ONCE  6/22/19 00:30


 6/22/19 00:31 DC 6/22/19 00:33


15 MG


 


 Metoclopramide HCl


  (Reglan Vial)  10 mg  1X  ONCE  6/22/19 00:30


 6/22/19 00:31 DC 6/22/19 00:35


10 MG


 


 Potassium Chloride


  (Klor-Con)  40 meq  1X  ONCE  6/22/19 00:30


 6/22/19 00:31 DC 6/22/19 00:24


40 MEQ


 


 Sodium Chloride  1,000 ml @ 


 1,000 mls/hr  1X  ONCE  6/21/19 23:00


 6/21/19 23:59 DC 6/21/19 23:20


1,000 MLS/HR











Allergies


Allergies





Allergies








Coded Allergies Type Severity Reaction Last Updated Verified


 


  benztropine Allergy Intermediate SEVERE PAIN 7/10/16 Yes


 


  dexamethasone Allergy Intermediate SEVERE PAIN 7/10/16 Yes











Physical Exam


Physical Exam





Constitutional: Well developed, well nourished, no acute distress, non-toxic 

appearance. []


HENT: Normocephalic, atraumatic, bilateral external ears normal, oropharynx 

moist, no oral exudates, nose normal. []


Eyes: PERRLA, EOMI, conjunctiva normal, no discharge. [] 


Neck: Normal range of motion, no tenderness, supple, no stridor. [] 


Cardiovascular:Heart rate regular rhythm, no murmur []


Lungs & Thorax:  Bilateral breath sounds clear to auscultation []


Abdomen: Bowel sounds normal, soft, no tenderness, no masses, no pulsatile 

masses. [] 


Skin: Warm, dry, no erythema, no rash. [] 


Back: No tenderness, no CVA tenderness. [] 


Extremities: No tenderness, no cyanosis, no clubbing, ROM intact, no edema. [] 


Neurologic: Alert and oriented X 3, normal motor function, normal sensory 

function, no focal deficits noted. []


Psychologic: Affect normal, judgement normal, mood normal. []





Current Patient Data


Vital Signs





                                   Vital Signs








  Date Time  Temp Pulse Resp B/P (MAP) Pulse Ox O2 Delivery O2 Flow Rate FiO2


 


6/22/19 00:02  83 20  100   


 


6/21/19 22:17 98.2   185/98 (127)  Room Air  





 98.2       








Lab Values





                                Laboratory Tests








Test


 6/21/19


22:53 6/21/19


23:12


 


White Blood Count


 7.6 x10^3/uL


(4.0-11.0) 





 


Red Blood Count


 4.62 x10^6/uL


(3.50-5.40) 





 


Hemoglobin


 13.9 g/dL


(12.0-15.5) 





 


Hematocrit


 39.8 %


(36.0-47.0) 





 


Mean Corpuscular Volume


 86 fL ()


 





 


Mean Corpuscular Hemoglobin 30 pg (25-35)   


 


Mean Corpuscular Hemoglobin


Concent 35 g/dL


(31-37) 





 


Red Cell Distribution Width


 13.5 %


(11.5-14.5) 





 


Platelet Count


 270 x10^3/uL


(140-400) 





 


Neutrophils (%) (Auto) 58 % (31-73)   


 


Lymphocytes (%) (Auto) 31 % (24-48)   


 


Monocytes (%) (Auto) 9 % (0-9)   


 


Eosinophils (%) (Auto) 1 % (0-3)   


 


Basophils (%) (Auto) 1 % (0-3)   


 


Neutrophils # (Auto)


 4.4 x10^3uL


(1.8-7.7) 





 


Lymphocytes # (Auto)


 2.4 x10^3/uL


(1.0-4.8) 





 


Monocytes # (Auto)


 0.7 x10^3/uL


(0.0-1.1) 





 


Eosinophils # (Auto)


 0.1 x10^3/uL


(0.0-0.7) 





 


Basophils # (Auto)


 0.0 x10^3/uL


(0.0-0.2) 





 


Maternal Serum HCG Beta


Subunit < 1 mIU/mL


(0-5) 





 


Sodium Level


 141 mmol/L


(136-145) 





 


Potassium Level


 3.2 mmol/L


(3.5-5.1)  L 





 


Chloride Level


 103 mmol/L


() 





 


Carbon Dioxide Level


 24 mmol/L


(21-32) 





 


Anion Gap 14 (6-14)   


 


Blood Urea Nitrogen


 8 mg/dL (7-20)


 





 


Creatinine


 0.8 mg/dL


(0.6-1.0) 





 


Estimated GFR


(Cockcroft-Gault) 79.9  


 





 


BUN/Creatinine Ratio 10 (6-20)   


 


Glucose Level


 103 mg/dL


(70-99)  H 





 


Calcium Level


 9.7 mg/dL


(8.5-10.1) 





 


Magnesium Level


 1.8 mg/dL


(1.8-2.4) 





 


Total Bilirubin


 0.4 mg/dL


(0.2-1.0) 





 


Aspartate Amino Transferase


(AST) 25 U/L (15-37)


 





 


Alanine Aminotransferase (ALT)


 32 U/L (14-59)


 





 


Alkaline Phosphatase


 51 U/L


() 





 


Troponin I Quantitative


 < 0.017 ng/mL


(0.000-0.055) 





 


Total Protein


 7.9 g/dL


(6.4-8.2) 





 


Albumin


 4.1 g/dL


(3.4-5.0) 





 


Albumin/Globulin Ratio 1.1 (1.0-1.7)   


 


Ethyl Alcohol Level


 < 3 mg/dL


(0-10) 





 


Urine Collection Type  Unknown  


 


Urine Color  Yellow  


 


Urine Clarity  Clear  


 


Urine pH  6.5  


 


Urine Specific Gravity  1.010  


 


Urine Protein


 


 Negative mg/dL


(NEG-TRACE)


 


Urine Glucose (UA)


 


 Negative mg/dL


(NEG)


 


Urine Ketones (Stick)


 


 15 mg/dL (NEG)





 


Urine Blood  Trace (NEG)  


 


Urine Nitrite


 


 Negative (NEG)





 


Urine Bilirubin


 


 Negative (NEG)





 


Urine Urobilinogen Dipstick


 


 0.2 mg/dL (0.2


mg/dL)


 


Urine Leukocyte Esterase


 


 Negative (NEG)





 


Urine RBC


 


 Occ /HPF (0-2)





 


Urine WBC


 


 1-4 /HPF (0-4)





 


Urine Squamous Epithelial


Cells 


 Mod /LPF  





 


Urine Bacteria


 


 Few /HPF


(0-FEW)


 


Urine Opiates Screen  Neg (NEG)  


 


Urine Methadone Screen  Neg (NEG)  


 


Urine Barbiturates  Neg (NEG)  


 


Urine Phencyclidine Screen  Neg (NEG)  


 


Urine


Amphetamine/Methamphetamine 


 Neg (NEG)  





 


Urine Benzodiazepines Screen  Neg (NEG)  


 


Urine Cocaine Screen  Neg (NEG)  


 


Urine Cannabinoids Screen  Neg (NEG)  


 


Urine Ethyl Alcohol  Neg (NEG)  





                                Laboratory Tests


6/21/19 22:53








                                Laboratory Tests


6/21/19 22:53














EKG


EKG


EKG obtained 06/21/19 at 2246


Interpreted by Dr. Bartlett





Sinus rhythm


Rate 74


No STEMI





Radiology/Procedures


Radiology/Procedures


PROCEDURE: CHEST PA & LATERAL





PA and lateral chest.


 


HISTORY: Chest tightness


 


PA and lateral views were taken of the chest. Lungs are clear. Heart is 


normal in size without heart failure. There is no pleural effusion.


 


IMPRESSION:


1. No acute chest disease.


 


Electronically signed by: Aries Cummins MD (6/21/2019 11:33 PM) [facility]














DICTATED and SIGNED BY:     ARIES CUMMINS MD


DATE:     06/21/19 6400





Course & Med Decision Making


Course & Med Decision Making


Pertinent Labs and Imaging studies reviewed. (See chart for details)





Prior to getting patient's hCG quantitative level which pt knew was pending she 

signed waver for chest xray is what she reports to this provider. Discussed less

 than one hCG quantitative level and patient states she feels like she probably 

miscarried 1-2 weeks ago. Discussed other test results with patient. With less 

than one quantitative hCG discussed obtaining CT head for further evaluation of 

patient's report of headache and vision changes. At this time patient is 

refusing CT head as she has had multiple images in the past. Patient states she 

is just preferring treatment with medication and if symptoms improve she is 

wanting to be discharged. During this discussion patient was able to turn to her

 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 She was in no visible distress with clear 

speech. Will treat patient with dose of Toradol and Reglan.





Dragon Disclaimer


Dragon Disclaimer


This electronic medical record was generated, in whole or in part, using a voice

 recognition dictation system.





Departure


Departure


Impression:  


   Primary Impression:  


   Headache


Disposition:  01 HOME, SELF-CARE


Condition:  STABLE


Referrals:  


BENNIE PASCUAL MD (PCP)


Patient Instructions:  Headache, FAQs





Additional Instructions:  


Continue home medications as prescribed. 





Drink plenty of fluids. Eat well-balanced meals.





Follow-up with your primary care physician on Monday for reevaluation and 

further care. With ongoing headaches you should have follow-up with your 

neurologist for reevaluation and further care.





Your pregnancy level was <1 follow-up with your OB/GYN doctor on Monday for 

reevaluation and further care.











MARK ANTHONY GONZALEZ          Jun 22, 2019 00:40

## 2020-07-27 ENCOUNTER — HOSPITAL ENCOUNTER (OUTPATIENT)
Dept: HOSPITAL 61 - KCIC MRI | Age: 40
End: 2020-07-27
Attending: ORTHOPAEDIC SURGERY
Payer: OTHER GOVERNMENT

## 2020-07-27 DIAGNOSIS — M25.521: Primary | ICD-10-CM

## 2020-07-27 PROCEDURE — 73221 MRI JOINT UPR EXTREM W/O DYE: CPT

## 2020-07-27 NOTE — [facility]
MRI study of the right elbow without contrast

 

Clinical indications: Right elbow pain for 5 years from repetitive motion.

 

TECHNIQUE: Noncontrast MRI sequences of the right shoulder were performed 

in all 3 planes.

 

FINDINGS: No joint effusion is seen. No osteochondral abnormality of the 

radial capitellar joint or the trochlear ulnar joint is seen. No fracture 

or marrow infiltrative process is seen. No bone contusion is evident. The 

radial collateral ligament and ulnar collateral ligament and lateral ulnar

collateral ligament and annular ligament are intact. There is tendinosis 

of the common extensor tendon mechanism. There is edema of the extensor 

carpi radialis longus muscle and tendon. This is consistent with a grade 1

muscle strain. Common flexor tendon mechanism is intact. The biceps and 

brachialis and triceps tendons are intact. No olecranon bursitis is seen. 

No soft tissue mass or abscess is seen.

 

IMPRESSION: Tendinosis of the common extensor tendon mechanism without 

high-grade tear or complete tear. Associated grade 1 muscle strain of the 

extensor carpi radialis longus muscle and tendon.

 

No fracture. No ligament tear.

 

Electronically signed by: Mykel Jones MD (7/27/2020 1:22 PM) [id number]

## 2023-08-19 ENCOUNTER — HOSPITAL ENCOUNTER (EMERGENCY)
Facility: HOSPITAL | Age: 43
Discharge: HOME | End: 2023-08-19
Attending: STUDENT IN AN ORGANIZED HEALTH CARE EDUCATION/TRAINING PROGRAM
Payer: COMMERCIAL

## 2023-08-19 VITALS
BODY MASS INDEX: 28.17 KG/M2 | DIASTOLIC BLOOD PRESSURE: 95 MMHG | OXYGEN SATURATION: 97 % | HEIGHT: 64 IN | HEART RATE: 81 BPM | RESPIRATION RATE: 15 BRPM | WEIGHT: 165 LBS | SYSTOLIC BLOOD PRESSURE: 162 MMHG | TEMPERATURE: 97.7 F

## 2023-08-19 DIAGNOSIS — G43.801 OTHER MIGRAINE WITH STATUS MIGRAINOSUS, NOT INTRACTABLE: Primary | ICD-10-CM

## 2023-08-19 PROCEDURE — 96375 TX/PRO/DX INJ NEW DRUG ADDON: CPT

## 2023-08-19 PROCEDURE — 96374 THER/PROPH/DIAG INJ IV PUSH: CPT

## 2023-08-19 PROCEDURE — 99283 EMERGENCY DEPT VISIT LOW MDM: CPT

## 2023-08-19 PROCEDURE — 25010000002 DIPHENHYDRAMINE PER 50 MG: Performed by: STUDENT IN AN ORGANIZED HEALTH CARE EDUCATION/TRAINING PROGRAM

## 2023-08-19 PROCEDURE — 25010000002 KETOROLAC TROMETHAMINE PER 15 MG: Performed by: STUDENT IN AN ORGANIZED HEALTH CARE EDUCATION/TRAINING PROGRAM

## 2023-08-19 PROCEDURE — 96361 HYDRATE IV INFUSION ADD-ON: CPT

## 2023-08-19 PROCEDURE — 25010000002 METOCLOPRAMIDE PER 10 MG: Performed by: STUDENT IN AN ORGANIZED HEALTH CARE EDUCATION/TRAINING PROGRAM

## 2023-08-19 RX ORDER — SODIUM CHLORIDE 0.9 % (FLUSH) 0.9 %
10 SYRINGE (ML) INJECTION AS NEEDED
Status: DISCONTINUED | OUTPATIENT
Start: 2023-08-19 | End: 2023-08-19 | Stop reason: HOSPADM

## 2023-08-19 RX ORDER — DOXYCYCLINE 100 MG/1
100 CAPSULE ORAL 2 TIMES DAILY
Qty: 10 CAPSULE | Refills: 0 | Status: SHIPPED | OUTPATIENT
Start: 2023-08-19 | End: 2023-08-24

## 2023-08-19 RX ORDER — ONDANSETRON 4 MG/1
4 TABLET, ORALLY DISINTEGRATING ORAL EVERY 8 HOURS PRN
Qty: 42 TABLET | Refills: 0 | Status: SHIPPED | OUTPATIENT
Start: 2023-08-19 | End: 2023-09-02

## 2023-08-19 RX ORDER — SUMATRIPTAN 100 MG/1
100 TABLET, FILM COATED ORAL AS NEEDED
Qty: 30 TABLET | Refills: 0 | Status: SHIPPED | OUTPATIENT
Start: 2023-08-19 | End: 2023-09-18

## 2023-08-19 RX ORDER — DIPHENHYDRAMINE HYDROCHLORIDE 50 MG/ML
25 INJECTION INTRAMUSCULAR; INTRAVENOUS ONCE
Status: COMPLETED | OUTPATIENT
Start: 2023-08-19 | End: 2023-08-19

## 2023-08-19 RX ORDER — KETOROLAC TROMETHAMINE 15 MG/ML
15 INJECTION, SOLUTION INTRAMUSCULAR; INTRAVENOUS ONCE
Status: COMPLETED | OUTPATIENT
Start: 2023-08-19 | End: 2023-08-19

## 2023-08-19 RX ORDER — METOCLOPRAMIDE HYDROCHLORIDE 5 MG/ML
10 INJECTION INTRAMUSCULAR; INTRAVENOUS ONCE
Status: COMPLETED | OUTPATIENT
Start: 2023-08-19 | End: 2023-08-19

## 2023-08-19 RX ADMIN — METOCLOPRAMIDE 10 MG: 5 INJECTION, SOLUTION INTRAMUSCULAR; INTRAVENOUS at 15:19

## 2023-08-19 RX ADMIN — DIPHENHYDRAMINE HYDROCHLORIDE 25 MG: 50 INJECTION, SOLUTION INTRAMUSCULAR; INTRAVENOUS at 15:19

## 2023-08-19 RX ADMIN — SODIUM CHLORIDE 1000 ML: 0.9 INJECTION, SOLUTION INTRAVENOUS at 15:19

## 2023-08-19 RX ADMIN — KETOROLAC TROMETHAMINE 15 MG: 15 INJECTION, SOLUTION INTRAMUSCULAR; INTRAVENOUS at 15:19

## 2023-08-19 NOTE — FSED PROVIDER NOTE
Subjective   History of Present Illness  Patient is a 43-year-old female who presents to the emergency department for a migraine.  Patient has a history of migraines, she states she follows with neurologist states but ran out of her medication as she is a travel ultrasound technologist and has been unable to see her neurologist at home.  Patient states her migraine started yesterday, she states it is typical of her migraines.  She has a bandlike pain around her head and behind her eyes, associate with photophobia, phonophobia and nausea.  She has not had any significant vomiting.  She denies any neck stiffness, fever, chills, abdominal pain, chest pain, shortness of breath, lightheadedness, dizziness.  Patient denies any head trauma or falls.    Review of Systems   Constitutional:  Negative for activity change and fever.   HENT:  Negative for congestion, rhinorrhea and sore throat.    Eyes:  Positive for photophobia.   Respiratory:  Negative for cough and shortness of breath.    Cardiovascular:  Negative for chest pain.   Gastrointestinal:  Positive for nausea. Negative for abdominal pain and vomiting.   Genitourinary:  Negative for dysuria.   Musculoskeletal:  Negative for back pain and myalgias.   Skin:  Negative for rash.   Neurological:  Positive for headaches. Negative for dizziness and light-headedness.   Psychiatric/Behavioral:  Negative for confusion.      Past Medical History:   Diagnosis Date    Migraines        No Known Allergies    History reviewed. No pertinent surgical history.    History reviewed. No pertinent family history.    Social History     Socioeconomic History    Marital status: Single   Tobacco Use    Smoking status: Never    Smokeless tobacco: Never   Vaping Use    Vaping Use: Never used   Substance and Sexual Activity    Alcohol use: Never    Drug use: Never    Sexual activity: Defer           Objective   Physical Exam  Vitals and nursing note reviewed.   Constitutional:       General: She  is not in acute distress.     Appearance: Normal appearance. She is not ill-appearing.   HENT:      Head: Normocephalic and atraumatic.      Nose: Nose normal. No congestion.      Mouth/Throat:      Mouth: Mucous membranes are moist.      Pharynx: No oropharyngeal exudate.   Eyes:      General: No visual field deficit.     Conjunctiva/sclera: Conjunctivae normal.      Pupils: Pupils are equal, round, and reactive to light.   Cardiovascular:      Rate and Rhythm: Normal rate and regular rhythm.      Heart sounds: No murmur heard.  Pulmonary:      Effort: Pulmonary effort is normal.      Breath sounds: No wheezing, rhonchi or rales.   Abdominal:      General: Abdomen is flat.      Palpations: Abdomen is soft.      Tenderness: There is no abdominal tenderness. There is no guarding or rebound.   Musculoskeletal:         General: No swelling or tenderness. Normal range of motion.      Cervical back: Normal range of motion and neck supple.   Skin:     General: Skin is warm and dry.      Findings: No rash.   Neurological:      General: No focal deficit present.      Mental Status: She is alert and oriented to person, place, and time.      Cranial Nerves: Cranial nerves 2-12 are intact. No cranial nerve deficit, dysarthria or facial asymmetry.      Sensory: Sensation is intact. No sensory deficit.      Motor: Motor function is intact. No weakness or pronator drift.      Coordination: Coordination is intact. Finger-Nose-Finger Test normal.      Comments: Patient is awake alert and oriented x3.  Speech is clear and coherent.  Cranial nerves II-XII are grossly intact.  5/5 motor strength upper and lower extremities.  Normal finger-to-nose, no pronator drift.  No focal neurological deficits.       Procedures           ED Course                                           Medical Decision Making  Patient is a 43-year-old female presents emergency department secondary to a migraine.  Patient has a long history of migraines and  follows with neurology but has been out of her medications.  Migraine today feels typical of her normal migraine, no atypical symptoms.  Physical examination shows no neurological abnormality.  Indication for CT imaging at this time.  IV was placed and patient received IV fluid, IV Toradol, Benadryl and Reglan and did have improvement in her symptoms.  Patient was provided Zofran for home along with a refill for her home Imitrex.  She was instructed to follow-up with her neurologist and was given strict return precautions.  She was discharged home in stable condition.    Problems Addressed:  Other migraine with status migrainosus, not intractable: complicated acute illness or injury    Risk  Prescription drug management.        Final diagnoses:   Other migraine with status migrainosus, not intractable       ED Disposition  ED Disposition       ED Disposition   Discharge    Condition   Stable    Comment   --               PATIENT CONNECTION - [facility] [address]  [phone]  Schedule an appointment as soon as possible for a visit       [place] [address] [place] [id number] [phone]             Medication List        New Prescriptions      doxycycline 100 MG capsule  Commonly known as: MONODOX  Take 1 capsule by mouth 2 (Two) Times a Day for 5 days.     ondansetron ODT 4 MG disintegrating tablet  Commonly known as: ZOFRAN-ODT  Place 1 tablet on the tongue Every 8 (Eight) Hours As Needed for Nausea or Vomiting for up to 14 days.     SUMAtriptan 100 MG tablet  Commonly known as: IMITREX  Take 1 tablet by mouth As Needed for Migraine for up to 30 days. Take one tablet at onset of headache. May repeat dose one time in 2 hours if headache not relieved.               Where to Get Your Medications        These medications were sent to [address], [place] [address] [facility] [phone] [facility] [phone]   [address]  [facility] [address]      Phone: [phone]   doxycycline 100 MG capsule  ondansetron ODT 4 MG disintegrating tablet  SUMAtriptan 100 MG tablet

## 2023-08-19 NOTE — ED NOTES
Pt discharged home in NAD. Pt verbalized an understanding of home care and follow up instructions. Pt educated on prescription medication, no further needs expressed.

## 2023-08-19 NOTE — ED NOTES
"Pt comes to desk requesting discharge papers. Pt had stopped fluids and removed PIV without staff knowledge. Pt informed that her 1 hour time is at 1619 and that she would not be discharged before then. Pt states \"I need to get to work, I may have to leave AMA.\" This RN informed patient that we could print the AMA paperwork for her, but we cannot discharge her before the 1 hour bryan. Pt walked back into room. MD Lewis and charge RN Adali, aware.  "

## 2023-08-19 NOTE — ED NOTES
"Pt informed that she would need to wait at least an hour after benadryl administration before being able to leave- since patient is driving to work. Pt agreeable and states \"that's fine, just give it.\"  "

## 2023-08-19 NOTE — DISCHARGE INSTRUCTIONS
You are seen emergency department for a migraine.  You were provided IV fluid, and IV medications.  You have been discharged home with Zofran, refill for your home Imitrex.  Please take these as directed and as needed.    Return to the ER immediately for severe or worsening symptoms or for the development of any new worrisome symptoms including but not limited to vision changes, numbness or weakness (especially if on one side of your body), speech changes, neck stiffness, fever, chills, or the inability to tolerate fluids by mouth.

## 2024-04-18 ENCOUNTER — OFFICE VISIT (OUTPATIENT)
Dept: INTERNAL MEDICINE | Facility: CLINIC | Age: 44
End: 2024-04-18
Payer: OTHER GOVERNMENT

## 2024-04-18 ENCOUNTER — TELEPHONE (OUTPATIENT)
Dept: INTERNAL MEDICINE | Facility: CLINIC | Age: 44
End: 2024-04-18

## 2024-04-18 VITALS
HEART RATE: 68 BPM | HEIGHT: 65 IN | SYSTOLIC BLOOD PRESSURE: 134 MMHG | DIASTOLIC BLOOD PRESSURE: 84 MMHG | WEIGHT: 182 LBS | TEMPERATURE: 98.6 F | BODY MASS INDEX: 30.32 KG/M2 | OXYGEN SATURATION: 97 %

## 2024-04-18 DIAGNOSIS — E66.09 CLASS 1 OBESITY DUE TO EXCESS CALORIES WITHOUT SERIOUS COMORBIDITY WITH BODY MASS INDEX (BMI) OF 30.0 TO 30.9 IN ADULT: ICD-10-CM

## 2024-04-18 DIAGNOSIS — Z76.89 ENCOUNTER TO ESTABLISH CARE: Primary | ICD-10-CM

## 2024-04-18 PROBLEM — L71.9 ROSACEA: Status: ACTIVE | Noted: 2022-04-30

## 2024-04-18 PROBLEM — J39.2 DISORDER OF PHARYNX: Status: ACTIVE | Noted: 2022-05-13

## 2024-04-18 PROBLEM — Q66.70 CONGENITAL PES CAVUS: Status: ACTIVE | Noted: 2023-10-02

## 2024-04-18 PROBLEM — I10 ESSENTIAL HYPERTENSION: Status: ACTIVE | Noted: 2022-03-24

## 2024-04-18 PROBLEM — M25.50 ARTHRALGIA OF MULTIPLE JOINTS: Status: ACTIVE | Noted: 2022-05-26

## 2024-04-18 PROBLEM — N87.9 CERVICAL DYSPLASIA: Status: ACTIVE | Noted: 2023-10-02

## 2024-04-18 PROBLEM — G47.00 INSOMNIA: Status: RESOLVED | Noted: 2022-02-17 | Resolved: 2024-04-18

## 2024-04-18 PROBLEM — R03.0 ELEVATED BLOOD-PRESSURE READING WITHOUT DIAGNOSIS OF HYPERTENSION: Status: ACTIVE | Noted: 2022-03-23

## 2024-04-18 PROBLEM — R39.15 URINARY URGENCY: Status: ACTIVE | Noted: 2022-12-28

## 2024-04-18 PROBLEM — G47.00 INSOMNIA: Status: ACTIVE | Noted: 2022-02-17

## 2024-04-18 PROBLEM — M06.9 RHEUMATOID ARTHRITIS: Status: ACTIVE | Noted: 2022-02-17

## 2024-04-18 PROBLEM — B00.9 HERPES SIMPLEX: Status: ACTIVE | Noted: 2022-08-29

## 2024-04-18 PROBLEM — M72.2 PLANTAR FASCIITIS OF RIGHT FOOT: Status: ACTIVE | Noted: 2023-07-07

## 2024-04-18 PROBLEM — R13.10 DYSPHAGIA: Status: ACTIVE | Noted: 2024-04-18

## 2024-04-18 PROBLEM — R23.2 FLUSHING: Status: ACTIVE | Noted: 2023-10-02

## 2024-04-18 PROBLEM — E78.00 HYPERCHOLESTEROLEMIA: Status: ACTIVE | Noted: 2022-03-23

## 2024-04-18 PROBLEM — Q66.70 CONGENITAL CAVUS DEFORMITY OF FOOT: Status: ACTIVE | Noted: 2023-10-02

## 2024-04-18 PROBLEM — Z98.890 POSTOPERATIVE STATE: Status: ACTIVE | Noted: 2020-10-29

## 2024-04-18 PROBLEM — F32.A DEPRESSIVE DISORDER: Status: ACTIVE | Noted: 2023-10-02

## 2024-04-18 PROBLEM — E03.9 HYPOTHYROIDISM: Status: ACTIVE | Noted: 2023-10-02

## 2024-04-18 PROBLEM — M25.521 ARTHRALGIA OF RIGHT ELBOW: Status: ACTIVE | Noted: 2020-10-02

## 2024-04-18 PROBLEM — R63.8 INCREASED BODY MASS INDEX: Status: ACTIVE | Noted: 2022-04-21

## 2024-04-18 PROBLEM — M77.11 LATERAL EPICONDYLITIS OF RIGHT ELBOW: Status: ACTIVE | Noted: 2020-09-16

## 2024-04-18 PROBLEM — M72.2 PLANTAR FASCIITIS OF LEFT FOOT: Status: ACTIVE | Noted: 2023-10-02

## 2024-04-18 PROBLEM — M25.571 ARTHRALGIA OF RIGHT ANKLE: Status: ACTIVE | Noted: 2023-07-07

## 2024-04-18 PROBLEM — N94.6 DYSMENORRHEA, UNSPECIFIED: Status: ACTIVE | Noted: 2021-02-20

## 2024-04-18 PROBLEM — E78.5 HYPERLIPIDEMIA: Status: ACTIVE | Noted: 2022-03-23

## 2024-04-18 PROBLEM — J30.9 ALLERGIC RHINITIS: Status: ACTIVE | Noted: 2023-10-02

## 2024-04-18 PROBLEM — L98.8 RHYTIDOSIS FACIALIS: Status: ACTIVE | Noted: 2022-04-30

## 2024-04-18 PROBLEM — E66.9 OBESITY: Status: ACTIVE | Noted: 2022-03-23

## 2024-04-18 PROBLEM — M72.2 PLANTAR FASCIAL FIBROMATOSIS: Status: ACTIVE | Noted: 2023-10-02

## 2024-04-18 PROBLEM — G43.909 MIGRAINE HEADACHE: Status: ACTIVE | Noted: 2023-10-02

## 2024-04-18 PROBLEM — G57.91: Status: ACTIVE | Noted: 2023-10-30

## 2024-04-18 PROBLEM — E66.3 OVERWEIGHT: Status: ACTIVE | Noted: 2022-04-14

## 2024-04-18 PROBLEM — R35.0 INCREASED FREQUENCY OF URINATION: Status: ACTIVE | Noted: 2022-12-28

## 2024-04-18 PROBLEM — N39.46 MIXED STRESS AND URGE URINARY INCONTINENCE: Status: ACTIVE | Noted: 2023-10-02

## 2024-04-18 PROBLEM — R53.83 FATIGUE: Status: ACTIVE | Noted: 2022-02-17

## 2024-04-18 NOTE — TELEPHONE ENCOUNTER
PATIENT WANTED TO INFORM SYMONE YIP THAT [facility] DOES NOT HAVE IT IN STOCK     PATIENT STATES SYMONE YIP INFORMED PATIENT TO CALLBACK SO SHE CAN FIND A PHARMACY FOR HER

## 2024-04-18 NOTE — TELEPHONE ENCOUNTER
Provider: MIKAEL ASHBY    Caller: JOSELINE CASTELLON    Relationship to Patient: SELF    Pharmacy: [id number] - [place], [place] - [address] [facility] [phone] [facility] [phone] [place]     Phone Number: [phone]     Reason for Call: PATIENT WAS SEEN IN OFFICE ON 04/18/24 AND WAS ASKED TO CHECK WITH BOTH HER INSURANCE COMPANY TO SEE IF THEY WOULD COVER HER MEDICATIONS PRESCRIBED AND NEITHER COMPANY WILL COVER EITHER ONE OF THE MEDICATIONS.     PATIENT WOULD LIKE TO DISCUSS THIS WITH HER PROVIDER AT MIKAEL'S CONVENIENCE.     PLEASE CALL TO ADVISE.

## 2024-04-18 NOTE — PROGRESS NOTES
Chief Complaint  Establish Care and Weight Loss    Subjective        Leslie Art presents to [facility]  History of Present Illness  Leslie Art has failed behavior, life style modification including calorie restriction for at least 3 months for weight loss.   We have discussed Wegovy for weight management.     Weight trend is     Health and Weight:   Weight trend is Wt Readings from Last 4 Encounters:  04/18/24 : 82.6 kg (182 lb)  03/25/24 : 77.1 kg (170 lb)  08/19/23 : 74.8 kg (165 lb)      BMI is >= 30 and <35. (Class 1 Obesity). The following options were offered after discussion;: weight loss educational material (shared in after visit summary), exercise counseling/recommendations, nutrition counseling/recommendations, and pharmacological intervention options      Wegovy is injected once weekly.  It is a single dose pen.  You will start at 0.25 mg weekly.   We have discussed and you did not report a family or personal history of thyroid c-cell tumors. If you develop a mass in the neck, difficulty swallowing or persistent hoarseness, you should report this to me immediately. No report of history of pancreatitis.      As we discussed, you will start with 0.25 mg weekly and every 4 weeks, if tolerating, you will notify me and we can adjust the dose.  Discussed goal is to stay on lowest dose for as long as possible before up titration as effectiveness wanes.      Side effects can by nausea, vomiting, diarrhea, constipation.  Reflux symptoms can worsen.  Work on eating smaller portions. Increase water intake.   Constipation: if this develops, you can take over-the-counter colace as needed.      Nausea: over-the-counter ginger chews has been effective.      Medication is not a substitute for healthily eating and exercise.    Plan includes concomitant reduced calorie diet and increased physical activity for chronic weight management.      Notify office of any side effects or  "questions.     If you plan to have surgery: you should not take a dose for 10 days prior to surgery.      Return to clinic 1 months to monitor weight, benefit and side effects.      Further information and possible savings program: [email or web address].[email or web address]/    We also discussed possibility of compound pharmacy if her insurance doesn't cover and she is agreeable.   Advised administration would be different and she will let me know if any questions outside what we discussed.      /84 (BP Location: Right arm, Patient Position: Sitting, Cuff Size: Adult)   Pulse 68   Temp 98.6 °F (37 °C)   Ht 165.1 cm (65\")   Wt 82.6 kg (182 lb)   SpO2 97%   BMI 30.29 kg/m²   Patient is here for weight management. Dietary and lifestyle changes were discussed and Wegovy was prescribed. Today patient's BMI is 30.29  Patient reports good compliance with low carb, intermittent fasting, restricted calories diet, and high protein, low carbs diet. Exercise: 4 days a week.  Patient reports good compliance with medication. As for the side effects: had experienced none.    We have discussed multiple medication options today. She is willing to use [place] if needed. She is aware Primo is not FDA approved. We will attempt to cover this medication through her medication and       Objective   Vital Signs:  /84 (BP Location: Right arm, Patient Position: Sitting, Cuff Size: Adult)   Pulse 68   Temp 98.6 °F (37 °C)   Ht 165.1 cm (65\")   Wt 82.6 kg (182 lb)   SpO2 97%   BMI 30.29 kg/m²   Estimated body mass index is 30.29 kg/m² as calculated from the following:    Height as of this encounter: 165.1 cm (65\").    Weight as of this encounter: 82.6 kg (182 lb).       BMI is >= 30 and <35. (Class 1 Obesity). The following options were offered after discussion;: weight loss educational material (shared in after visit summary), exercise counseling/recommendations, nutrition counseling/recommendations, and pharmacological " intervention options      Physical Exam   Result Review :    The following data was reviewed by: SYMONE Rivera on 04/18/2024:  Common labs          1/28/2024    07:56 4/1/2024    12:11 4/17/2024    08:46   Common Labs   WBC  5.14     10.86       Hemoglobin 13.1     12.8     12.8       Hematocrit 38.2     39.0     39.3       Platelets  349     316          Details          This result is from an external source.                        Assessment and Plan     Diagnoses and all orders for this visit:    1. Encounter to establish care (Primary)    2. Class 1 obesity due to excess calories without serious comorbidity with body mass index (BMI) of 30.0 to 30.9 in adult  Comments:  Semiglutide 0.25 mg [place] Injections  Orders:  -     Discontinue: Semaglutide-Weight Management 0.25 MG/0.5ML solution auto-injector; Inject 0.5 mL under the skin into the appropriate area as directed 1 (One) Time Per Week for 30 days.  Dispense: 2 mL; Refill: 0  -     Semaglutide-Weight Management 0.25 MG/0.5ML solution auto-injector; Inject 0.5 mL under the skin into the appropriate area as directed 1 (One) Time Per Week.  Dispense: 2 mL; Refill: 0    Patient is aware semaglutide will be filled by compounding pharmacy.  Patient is aware this is not FDA approved.  Patient still wishes to continue getting medication from compounding pharmacy due to lack of insurance coverage and/or drug shortage.  Patient educated fully on risks of taking this medication.  Patient verbalized understanding and would like to proceed.They will be in contact with you after your visit. Please schedule a visit one month after you begin your first injection.   Thank you for allowing us to care for you,  SYMONE Rivera       I spent 65 minutes caring for Leslie on this date of service. This time includes time spent by me in the following activities:preparing for the visit, reviewing tests, obtaining and/or reviewing a separately obtained history,  performing a medically appropriate examination and/or evaluation , counseling and educating the patient/family/caregiver, ordering medications, tests, or procedures, referring and communicating with other health care professionals , documenting information in the medical record, independently interpreting results and communicating that information with the patient/family/caregiver, care coordination, and answering questions and concerns about medication options.   Follow Up     Return in about 27 days (around 5/15/2024) for Recheck- weight meds.  Patient was given instructions and counseling regarding her condition or for health maintenance advice. Please see specific information pulled into the AVS if appropriate.

## 2024-04-18 NOTE — PATIENT INSTRUCTIONS
Semaglutide Injection (Weight Management)  What is this medication?  SEMAGLUTIDE (BOB a GLOO tide) promotes weight loss. It may also be used to maintain weight loss. It works by decreasing appetite. Changes to diet and exercise are often combined with this medication.  This medicine may be used for other purposes; ask your health care provider or pharmacist if you have questions.  COMMON BRAND NAME(S): Wegovy  What should I tell my care team before I take this medication?  They need to know if you have any of these conditions:  Endocrine tumors (MEN 2) or if someone in your family had these tumors  Eye disease, vision problems  Gallbladder disease  History of depression or mental health disease  History of pancreatitis  Kidney disease  Stomach or intestine problems  Suicidal thoughts, plans, or attempt; a previous suicide attempt by you or a family member  Thyroid cancer or if someone in your family had thyroid cancer  An unusual or allergic reaction to semaglutide, other medications, foods, dyes, or preservatives  Pregnant or trying to get pregnant  Breast-feeding  How should I use this medication?  This medication is injected under the skin. You will be taught how to prepare and give it. Take it as directed on the prescription label. It is given once every week (every 7 days). Keep taking it unless your care team tells you to stop.  It is important that you put your used needles and pens in a special sharps container. Do not put them in a trash can. If you do not have a sharps container, call your pharmacist or care team to get one.  A special MedGuide will be given to you by the pharmacist with each prescription and refill. Be sure to read this information carefully each time.  This medication comes with INSTRUCTIONS FOR USE. Ask your pharmacist for directions on how to use this medication. Read the information carefully. Talk to your pharmacist or care team if you have questions.  Talk to your care team about  the use of this medication in children. While it may be prescribed for children as young as 12 years for selected conditions, precautions do apply.  Overdosage: If you think you have taken too much of this medicine contact a poison control center or emergency room at once.  NOTE: This medicine is only for you. Do not share this medicine with others.  What if I miss a dose?  If you miss a dose and the next scheduled dose is more than 2 days away, take the missed dose as soon as possible. If you miss a dose and the next scheduled dose is less than 2 days away, do not take the missed dose. Take the next dose at your regular time. Do not take double or extra doses. If you miss your dose for 2 weeks or more, take the next dose at your regular time or call your care team to talk about how to restart this medication.  What may interact with this medication?  Insulin and other medications for diabetes  This list may not describe all possible interactions. Give your health care provider a list of all the medicines, herbs, non-prescription drugs, or dietary supplements you use. Also tell them if you smoke, drink alcohol, or use illegal drugs. Some items may interact with your medicine.  What should I watch for while using this medication?  Visit your care team for regular checks on your progress. It may be some time before you see the benefit from this medication.  Drink plenty of fluids while taking this medication. Check with your care team if you have severe diarrhea, nausea, and vomiting, or if you sweat a lot. The loss of too much body fluid may make it dangerous for you to take this medication.  This medication may affect blood sugar levels. Ask your care team if changes in diet or medications are needed if you have diabetes.  If you or your family notice any changes in your behavior, such as new or worsening depression, thoughts of harming yourself, anxiety, other unusual or disturbing thoughts, or memory loss, call  your care team right away.  Women should inform their care team if they wish to become pregnant or think they might be pregnant. Losing weight while pregnant is not advised and may cause harm to the unborn child. Talk to your care team for more information.  What side effects may I notice from receiving this medication?  Side effects that you should report to your care team as soon as possible:  Allergic reactions--skin rash, itching, hives, swelling of the face, lips, tongue, or throat  Change in vision  Dehydration--increased thirst, dry mouth, feeling faint or lightheaded, headache, dark yellow or brown urine  Gallbladder problems--severe stomach pain, nausea, vomiting, fever  Heart palpitations--rapid, pounding, or irregular heartbeat  Kidney injury--decrease in the amount of urine, swelling of the ankles, hands, or feet  Pancreatitis--severe stomach pain that spreads to your back or gets worse after eating or when touched, fever, nausea, vomiting  Thoughts of suicide or self-harm, worsening mood, feelings of depression  Thyroid cancer--new mass or lump in the neck, pain or trouble swallowing, trouble breathing, hoarseness  Side effects that usually do not require medical attention (report to your care team if they continue or are bothersome):  Diarrhea  Loss of appetite  Nausea  Stomach pain  Vomiting  This list may not describe all possible side effects. Call your doctor for medical advice about side effects. You may report side effects to FDA at 2-905-FDA-2372.  Where should I keep my medication?  Keep out of the reach of children and pets.  Refrigeration (preferred): Store in the refrigerator. Do not freeze. Keep this medication in the original container until you are ready to take it. Get rid of any unused medication after the expiration date.  Room temperature: If needed, prior to cap removal, the pen can be stored at room temperature for up to 28 days. Protect from light. If it is stored at room  temperature, get rid of any unused medication after 28 days or after it expires, whichever is first.  It is important to get rid of the medication as soon as you no longer need it or it is . You can do this in two ways:  Take the medication to a medication take-back program. Check with your pharmacy or law enforcement to find a location.  If you cannot return the medication, follow the directions in the MedGuide.  NOTE: This sheet is a summary. It may not cover all possible information. If you have questions about this medicine, talk to your doctor, pharmacist, or health care provider.  ©  [employer] (2022 00:00:00)

## 2024-04-22 NOTE — TELEPHONE ENCOUNTER
Caller: Leslie Art    Relationship: Self    Best call back number: [phone]     Who are you requesting to speak with (clinical staff, provider,  specific staff member): CLINICAL STAFF     What was the call regarding: CALLING TO GET UPDATED ON REQUEST - STATES THAT   [facility] - [place], [place] - [address] [facility] [address] - [phone] [facility] [phone]  [phone]   MAY HAVE THE MEDICATION PLEASE CALL AND ADVISE ASAP     Is it okay if the provider responds through MyChart: YES

## 2024-05-13 ENCOUNTER — TELEPHONE (OUTPATIENT)
Dept: INTERNAL MEDICINE | Facility: CLINIC | Age: 44
End: 2024-05-13
Payer: OTHER GOVERNMENT

## 2024-05-13 DIAGNOSIS — E66.09 CLASS 1 OBESITY DUE TO EXCESS CALORIES WITHOUT SERIOUS COMORBIDITY WITH BODY MASS INDEX (BMI) OF 30.0 TO 30.9 IN ADULT: ICD-10-CM

## 2024-05-13 NOTE — TELEPHONE ENCOUNTER
UNABLE TO WARM TRANSFER     Caller: Leslie Art    Relationship to patient: Self    Best call back number: [phone]     PATIENT STATES SHE JUST SPOKE WITH PHARMACY AND THEY HAVE NOT RECEIVED THE PRESCRIPTION

## 2024-05-13 NOTE — TELEPHONE ENCOUNTER
Caller: Leslie Art    Relationship to patient: Self    Best call back number: [phone]    Patient is needing: SHE IS GETTING READY TO GO ON VACATION AND WILL BE GONE 2 WEEKS.  SHE COMES IN TO SEE [facility] ON 5/23/24.  SHE WILL BE OUT OF THE MEDICATION BY THEN.  DO YOU WANT TO PRESCRIBE THE DOSE UP FOR WHEN SHE RUNS OUT?  PLEASE CALL AND ADVISE

## 2024-05-13 NOTE — TELEPHONE ENCOUNTER
Patient Leslie Art contacted our office redarding her medication of Semaglutide-Weight Management 0.25 MG/0.5 . Leslie needs us to call [facility] on provider line and verbally call in medication and then Leslie would like a phone call back when this is done

## 2024-05-22 ENCOUNTER — OFFICE VISIT (OUTPATIENT)
Dept: INTERNAL MEDICINE | Facility: CLINIC | Age: 44
End: 2024-05-22
Payer: OTHER GOVERNMENT

## 2024-05-22 VITALS
OXYGEN SATURATION: 99 % | WEIGHT: 168.2 LBS | SYSTOLIC BLOOD PRESSURE: 134 MMHG | DIASTOLIC BLOOD PRESSURE: 88 MMHG | TEMPERATURE: 96.9 F | BODY MASS INDEX: 28.02 KG/M2 | HEIGHT: 65 IN | HEART RATE: 66 BPM

## 2024-05-22 DIAGNOSIS — L71.9 ROSACEA: ICD-10-CM

## 2024-05-22 DIAGNOSIS — E66.09 CLASS 1 OBESITY DUE TO EXCESS CALORIES WITHOUT SERIOUS COMORBIDITY WITH BODY MASS INDEX (BMI) OF 30.0 TO 30.9 IN ADULT: Primary | ICD-10-CM

## 2024-05-22 DIAGNOSIS — E66.3 OVERWEIGHT: ICD-10-CM

## 2024-05-22 PROCEDURE — 99213 OFFICE O/P EST LOW 20 MIN: CPT

## 2024-05-22 NOTE — PROGRESS NOTES
"Chief Complaint  Weight Check (Working well the last week no appetite suppression at all )  Answers submitted by the patient for this visit:  Other (Submitted on 5/21/2024)  Please describe your symptoms.: F/u on medication  Have you had these symptoms before?: No  How long have you been having these symptoms?: 1-4 days  Primary Reason for Visit (Submitted on 5/21/2024)  What is the primary reason for your visit?: Other    Subjective        Leslie Art presents to [facility]    History of Present Illness  /88 (BP Location: Right arm, Patient Position: Sitting, Cuff Size: Adult)   Pulse 66   Temp 96.9 °F (36.1 °C) (Temporal)   Ht 165.1 cm (65\")   Wt 76.3 kg (168 lb 3.2 oz)   SpO2 99%   BMI 27.99 kg/m²   Patient is here for weight management. Last time we had addressed this problem 4 week ago. At that time BMI was 30.3. Dietary  and lifestyle changes were discussed and  Semaglutide  was prescribed. Patient had lost weight since last visit. Today patient's BMI is 27. She weight loss goal is to reach a healthy weight of 150 lbs. She is having knee surgery in the next week and wanting to loose weight prior to surgery to help with an easier recovery.   Patient reports good compliance with low carb, and meal prep, restricted calories diet, and high protein, low carbs diet.   Patient reports good compliance with medication. As for the side effects: had experienced none.    Weight trend is     Health and Weight:   Weight trend is   Wt Readings from Last 4 Encounters:   05/22/24 76.3 kg (168 lb 3.2 oz)   04/18/24 82.6 kg (182 lb)   03/25/24 77.1 kg (170 lb)   08/19/23 74.8 kg (165 lb)             Current Outpatient Medications:     ondansetron ODT (ZOFRAN-ODT) 4 MG disintegrating tablet, Place 1 tablet on the tongue As Needed., Disp: , Rfl:     Semaglutide-Weight Management 0.25 MG/0.5ML solution auto-injector, Inject 1 mL under the skin into the appropriate area as directed 1 " "(One) Time Per Week., Disp: , Rfl:     SUMAtriptan (IMITREX) 100 MG tablet, Take 1 tablet by mouth As Needed for Migraine for up to 30 days. Take one tablet at onset of headache. May repeat dose one time in 2 hours if headache not relieved., Disp: 30 tablet, Rfl: 0       Objective   Vital Signs:  /88 (BP Location: Right arm, Patient Position: Sitting, Cuff Size: Adult)   Pulse 66   Temp 96.9 °F (36.1 °C) (Temporal)   Ht 165.1 cm (65\")   Wt 76.3 kg (168 lb 3.2 oz)   SpO2 99%   BMI 27.99 kg/m²   Estimated body mass index is 27.99 kg/m² as calculated from the following:    Height as of this encounter: 165.1 cm (65\").    Weight as of this encounter: 76.3 kg (168 lb 3.2 oz).               Physical Exam  Vitals reviewed.   Constitutional:       Appearance: Normal appearance. She is well-developed, well-groomed and overweight.   HENT:      Head: Normocephalic.      Nose: Nose normal.      Mouth/Throat:      Mouth: Mucous membranes are moist.   Cardiovascular:      Rate and Rhythm: Normal rate and regular rhythm.      Pulses: Normal pulses.      Heart sounds: Normal heart sounds.   Pulmonary:      Effort: Pulmonary effort is normal.      Breath sounds: Normal breath sounds.   Abdominal:      General: Abdomen is flat. Bowel sounds are normal.      Palpations: Abdomen is soft.   Skin:     General: Skin is warm and dry.      Capillary Refill: Capillary refill takes less than 2 seconds.   Neurological:      General: No focal deficit present.      Mental Status: She is alert.   Psychiatric:         Mood and Affect: Mood normal.         Behavior: Behavior normal. Behavior is cooperative.        Result Review :               Assessment and Plan     Diagnoses and all orders for this visit:    1. Class 1 obesity due to excess calories without serious comorbidity with body mass index (BMI) of 30.0 to 30.9 in adult (Primary)  Comments:  Semiglutide 0.5 mg Weeklly Injections  Orders:  -     Semaglutide-Weight Management " 0.25 MG/0.5ML solution auto-injector; Inject 1 mL under the skin into the appropriate area as directed 1 (One) Time Per Week.    2. Overweight    3. Rosacea  -     Cancel: Ambulatory Referral to Dermatology  -     Ambulatory Referral to Dermatology    Leslie Art has failed behavior, life style modification including calorie restriction for at least 3 months for weight loss.   We have discussed Wegovy for weight management.      Wegovy is injected once weekly.  It is a single dose pen.  You will start at 0.25 mg weekly.   We have discussed and you did not report a family or personal history of thyroid c-cell tumors. If you develop a mass in the neck, difficulty swallowing or persistent hoarseness, you should report this to me immediately. No report of history of pancreatitis.      As we discussed, you will start with 0.25 mg weekly and every 4 weeks, if tolerating, you will notify me and we can adjust the dose.  Discussed goal is to stay on lowest dose for as long as possible before up titration as effectiveness wanes.      Side effects can by nausea, vomiting, diarrhea, constipation.  Reflux symptoms can worsen.  Work on eating smaller portions. Increase water intake.   Constipation: if this develops, you can take over-the-counter colace as needed.     Nausea: over-the-counter ginger chews has been effective.      Medication is not a substitute for healthily eating and exercise.    Plan includes concomitant reduced calorie diet and increased physical activity for chronic weight management.      Notify office of any side effects or questions.     If you plan to have surgery: you should not take a dose for 10 days prior to surgery.      Return to clinic 1 months to monitor weight, benefit and side effects.      Further information and possible savings program: [email or web address]    We also discussed possibility of compound pharmacy if her insurance doesn't cover and she is agreeable.   Advised  administration would be different and she will let me know if any questions outside what we discussed.      Patient is aware semaglutide will be filled by compounding pharmacy.  Patient is aware this is not FDA approved.  Patient still wishes to continue getting medication from compounding pharmacy due to lack of insurance coverage and/or drug shortage.  Patient educated fully on risks of taking this medication.  Patient verbalized understanding and would like to proceed.They will be in contact with you after your visit. Please schedule a visit one month after you begin your first injection.     Thank you for allowing us to care for you,  SYMONE Rivera            Follow Up     Return in about 1 month (around 6/22/2024) for Recheck.  Patient was given instructions and counseling regarding her condition or for health maintenance advice. Please see specific information pulled into the AVS if appropriate.

## 2024-06-20 ENCOUNTER — OFFICE VISIT (OUTPATIENT)
Dept: INTERNAL MEDICINE | Facility: CLINIC | Age: 44
End: 2024-06-20
Payer: OTHER GOVERNMENT

## 2024-06-20 VITALS
OXYGEN SATURATION: 99 % | WEIGHT: 165 LBS | SYSTOLIC BLOOD PRESSURE: 130 MMHG | BODY MASS INDEX: 27.46 KG/M2 | TEMPERATURE: 97.3 F | HEART RATE: 77 BPM | DIASTOLIC BLOOD PRESSURE: 90 MMHG

## 2024-06-20 DIAGNOSIS — E66.09 CLASS 1 OBESITY DUE TO EXCESS CALORIES WITHOUT SERIOUS COMORBIDITY WITH BODY MASS INDEX (BMI) OF 30.0 TO 30.9 IN ADULT: ICD-10-CM

## 2024-06-20 DIAGNOSIS — Z76.0 ENCOUNTER FOR MEDICATION REFILL: Primary | ICD-10-CM

## 2024-06-20 DIAGNOSIS — E66.3 OVERWEIGHT (BMI 25.0-29.9): ICD-10-CM

## 2024-06-20 DIAGNOSIS — M54.32 SCIATICA OF LEFT SIDE: ICD-10-CM

## 2024-06-20 PROCEDURE — 99213 OFFICE O/P EST LOW 20 MIN: CPT

## 2024-06-20 RX ORDER — GABAPENTIN 300 MG/1
1 CAPSULE ORAL 3 TIMES DAILY
COMMUNITY
Start: 2024-06-06

## 2024-06-20 RX ORDER — BACLOFEN 10 MG/1
10 TABLET ORAL 3 TIMES DAILY
Qty: 90 TABLET | Refills: 3 | Status: SHIPPED | OUTPATIENT
Start: 2024-06-20

## 2024-06-20 RX ORDER — PREDNISONE 10 MG/1
TABLET ORAL
COMMUNITY

## 2024-06-20 RX ORDER — TRAMADOL HYDROCHLORIDE 50 MG/1
TABLET ORAL
COMMUNITY
Start: 2024-03-22 | End: 2024-06-21 | Stop reason: SDUPTHER

## 2024-06-20 RX ORDER — LIDOCAINE 50 MG/G
OINTMENT TOPICAL
COMMUNITY
Start: 2024-01-25

## 2024-06-20 RX ORDER — BACLOFEN 10 MG/1
1 TABLET ORAL 3 TIMES DAILY
COMMUNITY
Start: 2024-02-01 | End: 2024-06-20 | Stop reason: SDUPTHER

## 2024-06-20 RX ORDER — VALACYCLOVIR HCL 1000 MG
TABLET ORAL
COMMUNITY
Start: 2024-01-24 | End: 2024-06-20 | Stop reason: SDUPTHER

## 2024-06-20 RX ORDER — METHYLPREDNISOLONE 4 MG/1
TABLET ORAL
COMMUNITY
Start: 2024-06-07

## 2024-06-20 RX ORDER — ERGOCALCIFEROL 1.25 MG/1
50000 CAPSULE ORAL
COMMUNITY
Start: 2024-06-18

## 2024-06-20 RX ORDER — CELECOXIB 200 MG/1
CAPSULE ORAL
COMMUNITY
Start: 2024-05-20

## 2024-06-20 RX ORDER — SUMATRIPTAN 100 MG/1
100 TABLET, FILM COATED ORAL AS NEEDED
Qty: 30 TABLET | Refills: 0 | Status: SHIPPED | OUTPATIENT
Start: 2024-06-20 | End: 2024-07-20

## 2024-06-20 RX ORDER — VALACYCLOVIR HCL 1000 MG
1000 TABLET ORAL DAILY
Qty: 90 TABLET | Refills: 0 | Status: SHIPPED | OUTPATIENT
Start: 2024-06-20

## 2024-06-20 RX ORDER — ASPIRIN 81 MG/1
TABLET ORAL
COMMUNITY
Start: 2024-05-20

## 2024-06-20 NOTE — PROGRESS NOTES
Chief Complaint  Weight Check and Med Refill (Valtrex 90d, tramadol, sumatriptan, baclofen )    Subjective        Leslei Art presents to [facility]  History of Present Illness  /90 (BP Location: Right arm, Patient Position: Sitting, Cuff Size: Adult)   Pulse 77   Temp 97.3 °F (36.3 °C) (Temporal)   Wt 74.8 kg (165 lb)   SpO2 99%   BMI 27.46 kg/m²   Patient is here for weight management. Last time we had addressed this problem 4 weeks ago. At that time BMI was 28. Dietary and lifestyle changes were discussed and  semaglutide  was continued, but the dose was increased as per orders. Patient had lost 3 lbs since last visit. Today patient's BMI is 27.46.  Patient reports good compliance with low carb and high protein, low carbs diet. Exercise: 2 days a week.   Patient reports good compliance with medication. As for the side effects: had experienced none.  Goal weight is 135 lbs- we have discussed realistic expectaions for weght loss and Portion control.     Ms Art Recently underwent left knee surgery and is wanting dry needling for her siatica because she can not get her injections as previously due to not being able to get into to see pain management.         Current Outpatient Medications:     aspirin 81 MG EC tablet, Take 1 tablet twice a day by oral route for 42 days., Disp: , Rfl:     baclofen (LIORESAL) 10 MG tablet, Take 1 tablet by mouth 3 (Three) Times a Day., Disp: 90 tablet, Rfl: 3    celecoxib (CeleBREX) 200 MG capsule, Take 1 capsule every day by oral route for 30 days., Disp: , Rfl:     gabapentin (NEURONTIN) 300 MG capsule, Take 1 capsule by mouth 3 times a day., Disp: , Rfl:     ibuprofen (MOTRIN) rectal suppository 800 mg, , Disp: , Rfl:     lidocaine (XYLOCAINE) 5 % ointment, APPLY TO AFFECTED AREA(S) BY TOPICAL ROUTE 1-4 TIMES DAILY AS NEEDED, Disp: , Rfl:     methylPREDNISolone (MEDROL) 4 MG dose pack, follow package directions, Disp: , Rfl:      "ondansetron ODT (ZOFRAN-ODT) 4 MG disintegrating tablet, Place 1 tablet on the tongue As Needed., Disp: , Rfl:     predniSONE (DELTASONE) 10 MG tablet, TAKE 3 TABLETS BY MOUTH DAILY AT 8 AM AND 12 PM, Disp: , Rfl:     Semaglutide-Weight Management 1.7 MG/0.75ML solution auto-injector, Inject 0.44 mL under the skin into the appropriate area as directed 1 (One) Time Per Week., Disp: , Rfl:     SUMAtriptan (IMITREX) 100 MG tablet, Take 1 tablet by mouth As Needed for Migraine for up to 30 days. Take one tablet at onset of headache. May repeat dose one time in 2 hours if headache not relieved., Disp: 30 tablet, Rfl: 0    Valtrex 1 g tablet, Take 1 tablet by mouth Daily., Disp: 90 tablet, Rfl: 0    vitamin D (ERGOCALCIFEROL) 1.25 MG (67166 UT) capsule capsule, Take 1 capsule by mouth Every 7 (Seven) Days., Disp: , Rfl:     traMADol (ULTRAM) 50 MG tablet, Take 1 tablet by mouth Every 8 (Eight) Hours As Needed for Moderate Pain., Disp: 90 tablet, Rfl: 0     Objective   Vital Signs:  /90 (BP Location: Right arm, Patient Position: Sitting, Cuff Size: Adult)   Pulse 77   Temp 97.3 °F (36.3 °C) (Temporal)   Wt 74.8 kg (165 lb)   SpO2 99%   BMI 27.46 kg/m²   Estimated body mass index is 27.46 kg/m² as calculated from the following:    Height as of 5/22/24: 165.1 cm (65\").    Weight as of this encounter: 74.8 kg (165 lb).               Physical Exam  Vitals reviewed.   Constitutional:       General: She is awake.      Appearance: Normal appearance.   HENT:      Head: Normocephalic.   Eyes:      General: Lids are normal.   Cardiovascular:      Rate and Rhythm: Normal rate and regular rhythm.      Pulses: Normal pulses.      Heart sounds: Normal heart sounds.   Pulmonary:      Effort: Pulmonary effort is normal.      Breath sounds: Normal breath sounds.   Abdominal:      General: Abdomen is flat. Bowel sounds are normal.      Palpations: Abdomen is soft.      Tenderness: There is no abdominal tenderness. There is no " right CVA tenderness or left CVA tenderness.   Musculoskeletal:      Cervical back: Full passive range of motion without pain.      Left lower leg: Swelling, tenderness and bony tenderness present. 1+ Edema present.      Comments: Recent left knee surgery. Incision is healing well with no redness or signs of infection at this time.    Skin:     General: Skin is warm and dry.      Capillary Refill: Capillary refill takes less than 2 seconds.   Neurological:      General: No focal deficit present.      Mental Status: She is alert.      Coordination: Coordination is intact.   Psychiatric:         Attention and Perception: Attention and perception normal.         Mood and Affect: Mood and affect normal.         Speech: Speech normal.         Behavior: Behavior normal. Behavior is cooperative.         Cognition and Memory: Cognition and memory normal.         Judgment: Judgment normal.        Result Review :                     Assessment and Plan     Diagnoses and all orders for this visit:    1. Encounter for medication refill (Primary)  -     Valtrex 1 g tablet; Take 1 tablet by mouth Daily.  Dispense: 90 tablet; Refill: 0  -     SUMAtriptan (IMITREX) 100 MG tablet; Take 1 tablet by mouth As Needed for Migraine for up to 30 days. Take one tablet at onset of headache. May repeat dose one time in 2 hours if headache not relieved.  Dispense: 30 tablet; Refill: 0  -     baclofen (LIORESAL) 10 MG tablet; Take 1 tablet by mouth 3 (Three) Times a Day.  Dispense: 90 tablet; Refill: 3    2. Overweight (BMI 25.0-29.9)  -     Discontinue: Semaglutide-Weight Management 1.7 MG/0.75ML solution auto-injector; Inject 0.44 mL under the skin into the appropriate area as directed 1 (One) Time Per Week.  Dispense: 2 mL; Refill: 0  -     Discontinue: Semaglutide-Weight Management 1.7 MG/0.75ML solution auto-injector; Inject 0.75 mL under the skin into the appropriate area as directed 1 (One) Time Per Week.  -     Semaglutide-Weight  Management 1.7 MG/0.75ML solution auto-injector; Inject 0.44 mL under the skin into the appropriate area as directed 1 (One) Time Per Week.    3. Class 1 obesity due to excess calories without serious comorbidity with body mass index (BMI) of 30.0 to 30.9 in adult  Comments:  Semiglutide 0.5 mg Weeklly Injections  Orders:  -     Discontinue: Semaglutide-Weight Management 1.7 MG/0.75ML solution auto-injector; Inject 0.44 mL under the skin into the appropriate area as directed 1 (One) Time Per Week.  Dispense: 2 mL; Refill: 0  -     Discontinue: Semaglutide-Weight Management 1.7 MG/0.75ML solution auto-injector; Inject 0.75 mL under the skin into the appropriate area as directed 1 (One) Time Per Week.  -     Semaglutide-Weight Management 1.7 MG/0.75ML solution auto-injector; Inject 0.44 mL under the skin into the appropriate area as directed 1 (One) Time Per Week.    4. Sciatica of left side  -     Ambulatory Referral to Physical Therapy for Evaluation & Treatment    5. Medication refill    Leslie Art has failed behavior, life style modification including calorie restriction for at least 3 months for weight loss.   We have discussed Wegovy for weight management.      Wegovy is injected once weekly.  It is a single dose pen.  You will start at 0.25 mg weekly.   We have discussed and you did not report a family or personal history of thyroid c-cell tumors. If you develop a mass in the neck, difficulty swallowing or persistent hoarseness, you should report this to me immediately. No report of history of pancreatitis.      As we discussed, you will start with 0.25 mg weekly and every 4 weeks, if tolerating, you will notify me and we can adjust the dose.  Discussed goal is to stay on lowest dose for as long as possible before up titration as effectiveness wanes.      Side effects can by nausea, vomiting, diarrhea, constipation.  Reflux symptoms can worsen.  Work on eating smaller portions. Increase water intake.    Constipation: if this develops, you can take over-the-counter colace as needed.     Nausea: over-the-counter ginger chews has been effective.      Medication is not a substitute for healthily eating and exercise.    Plan includes concomitant reduced calorie diet and increased physical activity for chronic weight management.      Notify office of any side effects or questions.     If you plan to have surgery: you should not take a dose for 10 days prior to surgery.      Return to clinic 1 months to monitor weight, benefit and side effects.      Further information and possible savings program: [email or web address]    We also discussed possibility of compound pharmacy if her insurance doesn't cover and she is agreeable.   Advised administration would be different and she will let me know if any questions outside what we discussed.     Patient is aware semaglutide will be filled by compounding pharmacy.  Patient still wishes to continue getting medication from compounding pharmacy due to lack of insurance coverage and/or drug shortage.  Patient educated fully on risks of taking this medication.  Patient verbalized understanding and would like to proceed.They will be in contact with you after your visit. Please schedule a visit one month after you begin your first injection.     Thank you for allowing us to care for you,  SYMONE Rivera         Follow Up     Return in about 1 month (around 7/20/2024) for Recheck wt.  Patient was given instructions and counseling regarding her condition or for health maintenance advice. Please see specific information pulled into the AVS if appropriate.

## 2024-06-21 DIAGNOSIS — M25.50 ARTHRALGIA OF MULTIPLE JOINTS: Primary | ICD-10-CM

## 2024-06-21 RX ORDER — TRAMADOL HYDROCHLORIDE 50 MG/1
50 TABLET ORAL EVERY 8 HOURS PRN
Qty: 90 TABLET | Refills: 0 | Status: SHIPPED | OUTPATIENT
Start: 2024-06-21

## 2024-06-24 ENCOUNTER — TELEPHONE (OUTPATIENT)
Dept: INTERNAL MEDICINE | Facility: CLINIC | Age: 44
End: 2024-06-24
Payer: OTHER GOVERNMENT

## 2024-06-24 NOTE — TELEPHONE ENCOUNTER
Called talking about Rx; might need a new dx code. Please advise. Thank you.      traMADol (ULTRAM) 50 MG tablet [[id number]] (Order [id number])

## 2024-07-19 ENCOUNTER — TELEPHONE (OUTPATIENT)
Dept: INTERNAL MEDICINE | Facility: CLINIC | Age: 44
End: 2024-07-19

## 2024-07-23 ENCOUNTER — OFFICE VISIT (OUTPATIENT)
Dept: INTERNAL MEDICINE | Facility: CLINIC | Age: 44
End: 2024-07-23
Payer: OTHER GOVERNMENT

## 2024-07-23 VITALS
BODY MASS INDEX: 27.49 KG/M2 | TEMPERATURE: 97.1 F | WEIGHT: 165 LBS | SYSTOLIC BLOOD PRESSURE: 130 MMHG | HEART RATE: 74 BPM | OXYGEN SATURATION: 98 % | DIASTOLIC BLOOD PRESSURE: 82 MMHG | HEIGHT: 65 IN

## 2024-07-23 DIAGNOSIS — E66.3 OVERWEIGHT (BMI 25.0-29.9): Primary | ICD-10-CM

## 2024-07-23 DIAGNOSIS — E66.09 CLASS 1 OBESITY DUE TO EXCESS CALORIES WITHOUT SERIOUS COMORBIDITY WITH BODY MASS INDEX (BMI) OF 30.0 TO 30.9 IN ADULT: ICD-10-CM

## 2024-07-23 PROCEDURE — 99213 OFFICE O/P EST LOW 20 MIN: CPT

## 2024-07-23 RX ORDER — MULTIVITAMIN WITH IRON
TABLET ORAL
COMMUNITY

## 2024-07-23 RX ORDER — GABAPENTIN 600 MG/1
1 TABLET ORAL 3 TIMES DAILY
COMMUNITY
Start: 2024-06-25

## 2024-07-23 RX ORDER — MULTIVIT-MIN/IRON/FOLIC ACID/K 18-600-40
CAPSULE ORAL
COMMUNITY
Start: 2024-05-20

## 2024-07-23 RX ORDER — SENNOSIDES 8.6 MG
CAPSULE ORAL
COMMUNITY

## 2024-07-23 RX ORDER — ONDANSETRON 4 MG/1
4 TABLET, FILM COATED ORAL EVERY 6 HOURS PRN
COMMUNITY
Start: 2024-05-20

## 2024-07-23 NOTE — PROGRESS NOTES
"Chief Complaint  Weight Loss (Pt tolerating dose well would like increase ), Back Pain (Arthritis flare up following knee surgery  ), and Med Refill (Tramadol )    Subjective        Leslie Art presents to [facility]  History of Present Illness  Ms Art is here today for a follow up on her weight loss journey. She is still recovering from her Left knee surgery. She reports 8 days ago had injections  from pain management she is frustrated because she has been seeing pain management ([facility]) for pain since 2007 and still has minimal relief. She is doing PT for her back and knee and she is not sleeping well due to her pain. She has an appointment today with Dr Villagran at [place] [facility]. She is wanting a refill of her Tramadol and I have advised her that all pain management should be addressed with [facility] since she is now established with them.    /82 (BP Location: Left arm, Patient Position: Sitting, Cuff Size: Adult)   Pulse 74   Temp 97.1 °F (36.2 °C) (Temporal)   Ht 165.1 cm (65\")   Wt 74.8 kg (165 lb)   SpO2 98%   BMI 27.46 kg/m²   Patient is here for weight management. Last time we had addressed this problem 4 weeks ago. At that time BMI was 27.5. Dietary  and lifestyle changes were discussed and  Semaglutide  was continued, but the dose was increased as per orders. Patient had lost  0  lbs since last visit. Today patient's BMI is 27.5.  Patient reports good compliance with low carb, intermittent fasting, Weight Watchers dietary recommendations, and restricted calories diet. Exercise: 3 days a week.   Patient reports good compliance with medication. As for the side effects: had experienced none.  We have discussed in depth that she will need to see Dr Tomlinson for weight loss management moving forward if she is wanting to proceed with weight loss treatments. She is agreeable to this plan.     Objective   Vital Signs:  /82 " "(BP Location: Left arm, Patient Position: Sitting, Cuff Size: Adult)   Pulse 74   Temp 97.1 °F (36.2 °C) (Temporal)   Ht 165.1 cm (65\")   Wt 74.8 kg (165 lb)   SpO2 98%   BMI 27.46 kg/m²   Estimated body mass index is 27.46 kg/m² as calculated from the following:    Height as of this encounter: 165.1 cm (65\").    Weight as of this encounter: 74.8 kg (165 lb).               Physical Exam  Vitals reviewed.   Constitutional:       General: She is awake.      Appearance: Normal appearance. She is well-groomed and overweight.   HENT:      Head: Normocephalic.      Right Ear: Hearing normal.      Left Ear: Hearing normal.   Cardiovascular:      Rate and Rhythm: Normal rate and regular rhythm.      Pulses: Normal pulses.      Heart sounds: Normal heart sounds.   Pulmonary:      Effort: Pulmonary effort is normal.      Breath sounds: Normal breath sounds.   Abdominal:      General: Abdomen is protuberant. Bowel sounds are normal.      Palpations: Abdomen is soft.   Skin:     General: Skin is warm and dry.      Capillary Refill: Capillary refill takes less than 2 seconds.   Neurological:      General: No focal deficit present.      Mental Status: She is alert and oriented to person, place, and time. Mental status is at baseline.      Motor: Weakness present.      Gait: Gait abnormal.   Psychiatric:         Attention and Perception: Attention normal.         Mood and Affect: Mood normal.         Behavior: Behavior normal. Behavior is cooperative.        Result Review :                     Assessment and Plan     Diagnoses and all orders for this visit:    1. Overweight (BMI 25.0-29.9) (Primary)  -     Ambulatory Referral to Bariatric Surgery  -     Discontinue: Semaglutide-Weight Management 1.7 MG/0.75ML solution auto-injector; Inject 0.75 mL under the skin into the appropriate area as directed 1 (One) Time Per Week.  Dispense: 2 mL; Refill: 0  -     Semaglutide-Weight Management 1.7 MG/0.75ML solution auto-injector; " Inject 0.75 mL under the skin into the appropriate area as directed 1 (One) Time Per Week.  Dispense: 2 mL; Refill: 0    2. Class 1 obesity due to excess calories without serious comorbidity with body mass index (BMI) of 30.0 to 30.9 in adult  Comments:  Semiglutide 0.5 mg Weeklly Injections  Orders:  -     Ambulatory Referral to Bariatric Surgery  -     Discontinue: Semaglutide-Weight Management 1.7 MG/0.75ML solution auto-injector; Inject 0.75 mL under the skin into the appropriate area as directed 1 (One) Time Per Week.  Dispense: 2 mL; Refill: 0  -     Semaglutide-Weight Management 1.7 MG/0.75ML solution auto-injector; Inject 0.75 mL under the skin into the appropriate area as directed 1 (One) Time Per Week.  Dispense: 2 mL; Refill: 0      Please review added information under the Patient Instructions portion of your print out.      Please follow up with pain management for Pain Control. Please follow up with Dr Tomlinson for weight loss management needs.   Thank you for allowing us to care for you,  SYMONE Rivera           Follow Up     No follow-ups on file.  Patient was given instructions and counseling regarding her condition or for health maintenance advice. Please see specific information pulled into the AVS if appropriate.

## 2024-08-06 NOTE — PATIENT INSTRUCTIONS
Semaglutide Injection (Weight Management)  What is this medication?  SEMAGLUTIDE (BOB a GLOO tide) promotes weight loss. It may also be used to maintain weight loss. It works by decreasing appetite. Changes to diet and exercise are often combined with this medication.  This medicine may be used for other purposes; ask your health care provider or pharmacist if you have questions.  COMMON BRAND NAME(S): Wegovy  What should I tell my care team before I take this medication?  They need to know if you have any of these conditions:  Endocrine tumors (MEN 2) or if someone in your family had these tumors  Eye disease, vision problems  Gallbladder disease  History of depression or mental health disease  History of pancreatitis  Kidney disease  Stomach or intestine problems  Suicidal thoughts, plans, or attempt; a previous suicide attempt by you or a family member  Thyroid cancer or if someone in your family had thyroid cancer  An unusual or allergic reaction to semaglutide, other medications, foods, dyes, or preservatives  Pregnant or trying to get pregnant  Breast-feeding  How should I use this medication?  This medication is injected under the skin. You will be taught how to prepare and give it. Take it as directed on the prescription label. It is given once every week (every 7 days). Keep taking it unless your care team tells you to stop.  It is important that you put your used needles and pens in a special sharps container. Do not put them in a trash can. If you do not have a sharps container, call your pharmacist or care team to get one.  A special MedGuide will be given to you by the pharmacist with each prescription and refill. Be sure to read this information carefully each time.  This medication comes with INSTRUCTIONS FOR USE. Ask your pharmacist for directions on how to use this medication. Read the information carefully. Talk to your pharmacist or care team if you have questions.  Talk to your care team about  the use of this medication in children. While it may be prescribed for children as young as 12 years for selected conditions, precautions do apply.  Overdosage: If you think you have taken too much of this medicine contact a poison control center or emergency room at once.  NOTE: This medicine is only for you. Do not share this medicine with others.  What if I miss a dose?  If you miss a dose and the next scheduled dose is more than 2 days away, take the missed dose as soon as possible. If you miss a dose and the next scheduled dose is less than 2 days away, do not take the missed dose. Take the next dose at your regular time. Do not take double or extra doses. If you miss your dose for 2 weeks or more, take the next dose at your regular time or call your care team to talk about how to restart this medication.  What may interact with this medication?  Insulin and other medications for diabetes  This list may not describe all possible interactions. Give your health care provider a list of all the medicines, herbs, non-prescription drugs, or dietary supplements you use. Also tell them if you smoke, drink alcohol, or use illegal drugs. Some items may interact with your medicine.  What should I watch for while using this medication?  Visit your care team for regular checks on your progress. It may be some time before you see the benefit from this medication.  Drink plenty of fluids while taking this medication. Check with your care team if you have severe diarrhea, nausea, and vomiting, or if you sweat a lot. The loss of too much body fluid may make it dangerous for you to take this medication.  This medication may affect blood sugar levels. Ask your care team if changes in diet or medications are needed if you have diabetes.  Talk to your care team if may be pregnant. Losing weight while pregnant is not advised and may cause harm to the fetus. Talk to your care team for more information.  What side effects may I notice  from receiving this medication?  Side effects that you should report to your care team as soon as possible:  Allergic reactions--skin rash, itching, hives, swelling of the face, lips, tongue, or throat  Change in vision  Dehydration--increased thirst, dry mouth, feeling faint or lightheaded, headache, dark yellow or brown urine  Gallbladder problems--severe stomach pain, nausea, vomiting, fever  Heart palpitations--rapid, pounding, or irregular heartbeat  Kidney injury--decrease in the amount of urine, swelling of the ankles, hands, or feet  Pancreatitis--severe stomach pain that spreads to your back or gets worse after eating or when touched, fever, nausea, vomiting  Thoughts of suicide or self-harm, worsening mood, feelings of depression  Thyroid cancer--new mass or lump in the neck, pain or trouble swallowing, trouble breathing, hoarseness  Side effects that usually do not require medical attention (report these to your care team if they continue or are bothersome):  Diarrhea  Loss of appetite  Nausea  Upset stomach  This list may not describe all possible side effects. Call your doctor for medical advice about side effects. You may report side effects to FDA at 8-004-FDA-2373.  Where should I keep my medication?  Keep out of the reach of children and pets.  Refrigeration (preferred): Store in the refrigerator. Do not freeze. Keep this medication in the original container until you are ready to take it. Get rid of any unused medication after the expiration date.  Room temperature: If needed, prior to cap removal, the pen can be stored at room temperature for up to 28 days. Protect from light. If it is stored at room temperature, get rid of any unused medication after 28 days or after it expires, whichever is first.  It is important to get rid of the medication as soon as you no longer need it or it is . You can do this in two ways:  Take the medication to a medication take-back program. Check with your  pharmacy or law enforcement to find a location.  If you cannot return the medication, follow the directions in the MedGuide.  NOTE: This sheet is a summary. It may not cover all possible information. If you have questions about this medicine, talk to your doctor, pharmacist, or health care provider.  © 2024 [employer] (2024-02-25 00:00:00)

## 2024-08-23 DIAGNOSIS — Z76.0 ENCOUNTER FOR MEDICATION REFILL: ICD-10-CM

## 2024-08-23 RX ORDER — VALACYCLOVIR HCL 1000 MG
1000 TABLET ORAL DAILY
Qty: 90 TABLET | Refills: 0 | Status: SHIPPED | OUTPATIENT
Start: 2024-08-23

## 2024-08-23 RX ORDER — ONDANSETRON 4 MG/1
4 TABLET, FILM COATED ORAL EVERY 6 HOURS PRN
OUTPATIENT
Start: 2024-08-23

## 2024-08-23 RX ORDER — ERGOCALCIFEROL 1.25 MG/1
50000 CAPSULE, LIQUID FILLED ORAL
Qty: 5 CAPSULE | Status: CANCELLED | OUTPATIENT
Start: 2024-08-23

## 2024-08-23 RX ORDER — VALACYCLOVIR HCL 1000 MG
1000 TABLET ORAL DAILY
Qty: 90 TABLET | Refills: 0 | Status: CANCELLED | OUTPATIENT
Start: 2024-08-23

## 2024-08-23 RX ORDER — ERGOCALCIFEROL 1.25 MG/1
50000 CAPSULE ORAL
Qty: 8 CAPSULE | Refills: 0 | Status: SHIPPED | OUTPATIENT
Start: 2024-08-23

## 2024-09-27 ENCOUNTER — OFFICE VISIT (OUTPATIENT)
Dept: INTERNAL MEDICINE | Facility: CLINIC | Age: 44
End: 2024-09-27
Payer: OTHER GOVERNMENT

## 2024-09-27 VITALS
DIASTOLIC BLOOD PRESSURE: 88 MMHG | WEIGHT: 158.2 LBS | BODY MASS INDEX: 26.36 KG/M2 | HEART RATE: 81 BPM | OXYGEN SATURATION: 91 % | SYSTOLIC BLOOD PRESSURE: 136 MMHG | TEMPERATURE: 97.1 F | HEIGHT: 65 IN

## 2024-09-27 DIAGNOSIS — E66.3 OVERWEIGHT: Primary | ICD-10-CM

## 2024-09-27 PROCEDURE — 99213 OFFICE O/P EST LOW 20 MIN: CPT

## 2024-09-27 RX ORDER — ERYTHROMYCIN 5 MG/G
OINTMENT OPHTHALMIC
COMMUNITY
Start: 2024-09-25 | End: 2024-10-18

## 2024-09-27 RX ORDER — ZOLPIDEM TARTRATE 6.25 MG/1
1 TABLET, FILM COATED, EXTENDED RELEASE ORAL
COMMUNITY

## 2024-09-27 RX ORDER — TRIAMCINOLONE ACETONIDE 1 MG/G
CREAM TOPICAL
COMMUNITY
Start: 2024-08-15

## 2024-09-27 RX ORDER — TRETINOIN 0.25 MG/G
CREAM TOPICAL
COMMUNITY
Start: 2024-09-07

## 2024-09-27 RX ORDER — DOXYCYCLINE HYCLATE 50 MG/1
1 TABLET, FILM COATED ORAL DAILY
COMMUNITY
Start: 2024-09-04 | End: 2024-10-18

## 2024-09-27 NOTE — PROGRESS NOTES
"Chief Complaint  Weight Loss and Insomnia (Ambien is not helping )    Subjective        Leslie Art presents to [facility]  History of Present Illness  Ms Art is here today for weight loss management.  I had recently referred her to Dr. Tomlinson's office for weight management.  However they have told her if her BMI is not above 27 they will not do medical weight loss management.  I have given her the list of weight care and local weight loss if she would choose to follow-up with them.    Ms. Page is agreeable to this plan at this time.  She also states that she has completed physical therapy for her knee and is doing well.      Objective   Vital Signs:  /88 (BP Location: Left arm, Patient Position: Sitting, Cuff Size: Adult)   Pulse 81   Temp 97.1 °F (36.2 °C)   Ht 165.1 cm (65\")   Wt 71.8 kg (158 lb 3.2 oz)   SpO2 91%   BMI 26.33 kg/m²   Estimated body mass index is 26.33 kg/m² as calculated from the following:    Height as of this encounter: 165.1 cm (65\").    Weight as of this encounter: 71.8 kg (158 lb 3.2 oz).            Physical Exam  Vitals reviewed.   Constitutional:       General: She is awake.      Appearance: Normal appearance. She is well-groomed.   HENT:      Head: Normocephalic.   Cardiovascular:      Rate and Rhythm: Normal rate and regular rhythm.      Pulses: Normal pulses.      Heart sounds: Normal heart sounds.   Pulmonary:      Effort: Pulmonary effort is normal.      Breath sounds: Normal breath sounds.   Abdominal:      General: Abdomen is flat. Bowel sounds are normal.      Palpations: Abdomen is soft.   Skin:     General: Skin is warm and dry.      Capillary Refill: Capillary refill takes less than 2 seconds.   Neurological:      General: No focal deficit present.      Mental Status: She is alert.   Psychiatric:         Mood and Affect: Mood normal.         Behavior: Behavior normal. Behavior is cooperative.        Result Review :  The " following data was reviewed by: SYMONE Rivera on 09/27/2024:    CBC          1/28/2024    07:56 4/1/2024    12:11 4/17/2024    08:46   CBC   WBC  5.14     10.86       RBC 4.41     4.30     4.35       Hemoglobin 13.1     12.8     12.8       Hematocrit 38.2     39.0     39.3       MCV 86.6     90.7     90.3       MCH 29.7     29.8     29.4       MCHC 34.3     32.8     32.6       RDW  12.9     12.8       Platelets  349     316          Details          This result is from an external source.             CBC w/diff          1/28/2024    07:56 4/1/2024    12:11 4/17/2024    08:46   CBC w/Diff   WBC  5.14     10.86       RBC 4.41     4.30     4.35       Hemoglobin 13.1     12.8     12.8       Hematocrit 38.2     39.0     39.3       MCV 86.6     90.7     90.3       MCH 29.7     29.8     29.4       MCHC 34.3     32.8     32.6       RDW  12.9     12.8       Platelets  349     316       Neutrophil Rel %  52.5     72.1       Immature Granulocyte Rel % 0.3     1.8     1.3       Lymphocyte Rel %  34.6     18.3       Monocyte Rel %  9.1     7.6       Eosinophil Rel %  1.0     0.3       Basophil Rel %  1.0     0.4          Details          This result is from an external source.                           Assessment and Plan   Diagnoses and all orders for this visit:    1. Overweight (Primary)    Please review added information under the Patient Instructions portion of your print out.    Please consult  with [facility] or [facility] Care for further care with your weight management as we have discussed.     Per CDC recommendations each week the average adult needs 150 minutes (30 min workouts 3-5 times a week) of moderate - intensity physical activity and 2 days of muscle strengthening.     Thank you for allowing us to care for you,  SYMONE Rivera           Follow Up   Return if symptoms worsen or fail to improve.  Patient was given instructions and counseling regarding her condition or for health  maintenance advice. Please see specific information pulled into the AVS if appropriate.

## 2024-10-16 DIAGNOSIS — M72.2 PLANTAR FASCIITIS: Primary | ICD-10-CM

## 2024-10-18 ENCOUNTER — OFFICE VISIT (OUTPATIENT)
Dept: INTERNAL MEDICINE | Facility: CLINIC | Age: 44
End: 2024-10-18
Payer: OTHER GOVERNMENT

## 2024-10-18 ENCOUNTER — LAB (OUTPATIENT)
Facility: HOSPITAL | Age: 44
End: 2024-10-18
Payer: OTHER GOVERNMENT

## 2024-10-18 VITALS
BODY MASS INDEX: 27.24 KG/M2 | WEIGHT: 163.5 LBS | HEART RATE: 60 BPM | OXYGEN SATURATION: 98 % | DIASTOLIC BLOOD PRESSURE: 78 MMHG | SYSTOLIC BLOOD PRESSURE: 126 MMHG | HEIGHT: 65 IN

## 2024-10-18 DIAGNOSIS — E66.09 CLASS 1 OBESITY DUE TO EXCESS CALORIES WITHOUT SERIOUS COMORBIDITY WITH BODY MASS INDEX (BMI) OF 30.0 TO 30.9 IN ADULT: Chronic | ICD-10-CM

## 2024-10-18 DIAGNOSIS — E55.9 VITAMIN D DEFICIENCY: Chronic | ICD-10-CM

## 2024-10-18 DIAGNOSIS — E66.811 CLASS 1 OBESITY DUE TO EXCESS CALORIES WITHOUT SERIOUS COMORBIDITY WITH BODY MASS INDEX (BMI) OF 30.0 TO 30.9 IN ADULT: Chronic | ICD-10-CM

## 2024-10-18 DIAGNOSIS — M72.2 PLANTAR FASCIITIS OF RIGHT FOOT: Chronic | ICD-10-CM

## 2024-10-18 DIAGNOSIS — Z13.6 SCREENING FOR CARDIOVASCULAR CONDITION: ICD-10-CM

## 2024-10-18 DIAGNOSIS — Z12.31 SCREENING MAMMOGRAM FOR BREAST CANCER: ICD-10-CM

## 2024-10-18 DIAGNOSIS — E78.5 HYPERLIPIDEMIA, UNSPECIFIED HYPERLIPIDEMIA TYPE: Chronic | ICD-10-CM

## 2024-10-18 DIAGNOSIS — Z00.00 ANNUAL PHYSICAL EXAM: Primary | ICD-10-CM

## 2024-10-18 DIAGNOSIS — Z00.00 ROUTINE HEALTH MAINTENANCE: ICD-10-CM

## 2024-10-18 DIAGNOSIS — I10 ESSENTIAL HYPERTENSION: Chronic | ICD-10-CM

## 2024-10-18 DIAGNOSIS — M72.2 PLANTAR FASCIITIS OF LEFT FOOT: Chronic | ICD-10-CM

## 2024-10-18 PROBLEM — M51.369 DEGENERATION OF LUMBAR INTERVERTEBRAL DISC: Status: ACTIVE | Noted: 2024-10-18

## 2024-10-18 PROBLEM — M54.16 LUMBAR RADICULOPATHY: Status: ACTIVE | Noted: 2024-06-25

## 2024-10-18 PROBLEM — M46.1 INFLAMMATION OF SACROILIAC JOINT: Status: ACTIVE | Noted: 2024-08-01

## 2024-10-18 LAB
25(OH)D3 SERPL-MCNC: 64.6 NG/ML (ref 30–100)
ALBUMIN SERPL-MCNC: 4.4 G/DL (ref 3.5–5.2)
ALBUMIN/GLOB SERPL: 1.5 G/DL
ALP SERPL-CCNC: 58 U/L (ref 39–117)
ALT SERPL W P-5'-P-CCNC: 22 U/L (ref 1–33)
ANION GAP SERPL CALCULATED.3IONS-SCNC: 11.6 MMOL/L (ref 5–15)
AST SERPL-CCNC: 18 U/L (ref 1–32)
BASOPHILS # BLD AUTO: 0.02 10*3/MM3 (ref 0–0.2)
BASOPHILS NFR BLD AUTO: 0.2 % (ref 0–1.5)
BILIRUB SERPL-MCNC: 0.3 MG/DL (ref 0–1.2)
BUN SERPL-MCNC: 13 MG/DL (ref 6–20)
BUN/CREAT SERPL: 18.8 (ref 7–25)
CALCIUM SPEC-SCNC: 10 MG/DL (ref 8.6–10.5)
CHLORIDE SERPL-SCNC: 104 MMOL/L (ref 98–107)
CHOLEST SERPL-MCNC: 303 MG/DL (ref 0–200)
CO2 SERPL-SCNC: 20.4 MMOL/L (ref 22–29)
CREAT SERPL-MCNC: 0.69 MG/DL (ref 0.57–1)
DEPRECATED RDW RBC AUTO: 37.4 FL (ref 37–54)
EGFRCR SERPLBLD CKD-EPI 2021: 109.9 ML/MIN/1.73
EOSINOPHIL # BLD AUTO: 0 10*3/MM3 (ref 0–0.4)
EOSINOPHIL NFR BLD AUTO: 0 % (ref 0.3–6.2)
ERYTHROCYTE [DISTWIDTH] IN BLOOD BY AUTOMATED COUNT: 11.9 % (ref 12.3–15.4)
GLOBULIN UR ELPH-MCNC: 2.9 GM/DL
GLUCOSE SERPL-MCNC: 156 MG/DL (ref 65–99)
HBA1C MFR BLD: 5.1 % (ref 4.8–5.6)
HCT VFR BLD AUTO: 39.9 % (ref 34–46.6)
HDLC SERPL-MCNC: 68 MG/DL (ref 40–60)
HGB BLD-MCNC: 14.3 G/DL (ref 12–15.9)
IMM GRANULOCYTES # BLD AUTO: 0.05 10*3/MM3 (ref 0–0.05)
IMM GRANULOCYTES NFR BLD AUTO: 0.6 % (ref 0–0.5)
LDLC SERPL CALC-MCNC: 197 MG/DL (ref 0–100)
LDLC/HDLC SERPL: 2.86 {RATIO}
LYMPHOCYTES # BLD AUTO: 1.12 10*3/MM3 (ref 0.7–3.1)
LYMPHOCYTES NFR BLD AUTO: 12.8 % (ref 19.6–45.3)
MCH RBC QN AUTO: 31.2 PG (ref 26.6–33)
MCHC RBC AUTO-ENTMCNC: 35.8 G/DL (ref 31.5–35.7)
MCV RBC AUTO: 86.9 FL (ref 79–97)
MONOCYTES # BLD AUTO: 0.37 10*3/MM3 (ref 0.1–0.9)
MONOCYTES NFR BLD AUTO: 4.2 % (ref 5–12)
NEUTROPHILS NFR BLD AUTO: 7.2 10*3/MM3 (ref 1.7–7)
NEUTROPHILS NFR BLD AUTO: 82.2 % (ref 42.7–76)
NRBC BLD AUTO-RTO: 0 /100 WBC (ref 0–0.2)
PLATELET # BLD AUTO: 295 10*3/MM3 (ref 140–450)
PMV BLD AUTO: 11.4 FL (ref 6–12)
POTASSIUM SERPL-SCNC: 4.4 MMOL/L (ref 3.5–5.2)
PROT SERPL-MCNC: 7.3 G/DL (ref 6–8.5)
RBC # BLD AUTO: 4.59 10*6/MM3 (ref 3.77–5.28)
SODIUM SERPL-SCNC: 136 MMOL/L (ref 136–145)
TRIGL SERPL-MCNC: 202 MG/DL (ref 0–150)
TSH SERPL DL<=0.05 MIU/L-ACNC: 0.73 UIU/ML (ref 0.27–4.2)
VLDLC SERPL-MCNC: 38 MG/DL (ref 5–40)
WBC NRBC COR # BLD AUTO: 8.76 10*3/MM3 (ref 3.4–10.8)

## 2024-10-18 PROCEDURE — 83036 HEMOGLOBIN GLYCOSYLATED A1C: CPT

## 2024-10-18 PROCEDURE — 36415 COLL VENOUS BLD VENIPUNCTURE: CPT

## 2024-10-18 PROCEDURE — 80050 GENERAL HEALTH PANEL: CPT

## 2024-10-18 PROCEDURE — 80061 LIPID PANEL: CPT

## 2024-10-18 PROCEDURE — 82306 VITAMIN D 25 HYDROXY: CPT

## 2024-10-18 RX ORDER — BACLOFEN 10 MG/1
1 TABLET ORAL 3 TIMES DAILY
COMMUNITY

## 2024-10-18 NOTE — PROGRESS NOTES
Chief Complaint  Annual Exam    Subjective        Leslie Art presents to [facility]  History of Present Illness  Leslie is here for coordination of medical care, to discuss health maintenance, disease prevention as well as to followup on medical problems. She is seeing pain management for pain needs. She had messaged yesterday for plantar fasciitis bilateral feet. She has had it since she I was in her 20's. I have placed a referral to Podiatry.     Past Medical History:   Diagnosis Date    Anxiety     Depression     Hypertension     Migraines      Family History   Problem Relation Age of Onset    Obesity Mother     Hypertension Mother     Heart disease Mother     Sleep apnea Mother     Diabetes Father     Hypertension Father     Stroke Father     Heart attack Father     Obesity Sister     Sleep apnea Sister      Social History     Tobacco Use   Smoking Status Never    Passive exposure: Never   Smokeless Tobacco Never     Social History     Substance and Sexual Activity   Alcohol Use Never       Patient Care Team:  Ania Montes APRN as PCP - General (Family Medicine)     Social:  Marital status: co-habitating. She feels safe at home  Employment: full time.  Patient rates her stress level as: low.  Dental health: good. Brushes teeth 2 times a day, flosses 1 time a day . Dental visits: 2 times a year .  Vision correction: h/o lazic surgery  Hearing: normal.  Activity level is occasionally.   Exercises 2 per week.     Weight trend is     Health and Weight:   Weight trend is   Wt Readings from Last 4 Encounters:   10/18/24 74.2 kg (163 lb 8 oz)   09/27/24 71.8 kg (158 lb 3.2 oz)   07/23/24 74.8 kg (165 lb)   06/20/24 74.8 kg (165 lb)              Mental Health Check:   Depression screen: PHQ-2 Total Score:      Health Maintenance Female:  GYN:   Last gynecology appointment:   Patient's last mammogram was 10/31/25  Last Completed Mammogram            Ordered - MAMMOGRAM (Every 2  "Years) Ordered on 10/18/2024      10/31/2023  MAMMO SCREENING DIGITAL TOMOSYNTHESIS BILATERAL W CAD                   Advised routine self-breast exams monthly.  Sexually active:   Pap smears have been:   STI Screen:   [] HIV     [] Syphilis   [] Chlamydia/ Gonorrhea   [x] Declines STD screen    Colon cancer screen:     She has no change in bowel habits.   Patient's last colonoscopy was negative.   Last Completed Colonoscopy       This patient has no relevant Health Maintenance data.           She was advised to repeat in Patient declines due to age.    Vaccines:   Vaccines Due:   [] Prevnar 20     [] Flu  [] Shingrix (shingles: series of 2)   [] TDap  [] COVID (booster)     [x] Declines vaccines     Advised regular sunscreen.      Her cardiovascular risks are:     The 10-year ASCVD risk score (Mery KAHN, et al., 2019) is: 1.1%    Values used to calculate the score:      Age: 44 years      Sex: Female      Is Non- : No      Diabetic: No      Tobacco smoker: No      Systolic Blood Pressure: 126 mmHg      Is BP treated: No      HDL Cholesterol: 68 mg/dL      Total Cholesterol: 303 mg/dL    [] No Known risk factors    [] Hypertension   [] Hyperlipidemia  [] Diabetes    [] Obesity  [x] Family history   [] Current or hx tobacco use  [] Sedentary lifestyle   [] Post-menopausal       Objective   Vital Signs:  /78 (BP Location: Left arm, Patient Position: Sitting, Cuff Size: Adult)   Pulse 60   Ht 165.1 cm (65\")   Wt 74.2 kg (163 lb 8 oz)   SpO2 98%   BMI 27.21 kg/m²   Estimated body mass index is 27.21 kg/m² as calculated from the following:    Height as of this encounter: 165.1 cm (65\").    Weight as of this encounter: 74.2 kg (163 lb 8 oz).            Physical Exam  Vitals reviewed.   Constitutional:       General: She is awake. She is not in acute distress.     Appearance: Normal appearance. She is not ill-appearing, toxic-appearing or diaphoretic.   HENT:      Head: Normocephalic.     "  Right Ear: Hearing normal.      Left Ear: Hearing normal.      Nose: Nose normal.      Mouth/Throat:      Lips: Pink.      Mouth: Mucous membranes are moist.   Eyes:      General: Lids are normal. Vision grossly intact.      Extraocular Movements: Extraocular movements intact.      Conjunctiva/sclera: Conjunctivae normal.   Neck:      Thyroid: No thyroid mass, thyromegaly or thyroid tenderness.      Vascular: No carotid bruit or JVD.      Trachea: Trachea and phonation normal.   Cardiovascular:      Rate and Rhythm: Normal rate and regular rhythm.      Pulses: Normal pulses.      Heart sounds: Normal heart sounds, S1 normal and S2 normal.   Pulmonary:      Effort: Pulmonary effort is normal.      Breath sounds: Normal breath sounds. No stridor, decreased air movement or transmitted upper airway sounds. No decreased breath sounds.   Abdominal:      General: Bowel sounds are normal. There is no distension or abdominal bruit. There are no signs of injury.      Palpations: Abdomen is soft.      Tenderness: There is no abdominal tenderness. There is no right CVA tenderness, left CVA tenderness, guarding or rebound.      Hernia: No hernia is present.   Musculoskeletal:      Cervical back: Normal range of motion.      Thoracic back: Tenderness present. No swelling, edema, deformity, lacerations, spasms or bony tenderness. Decreased range of motion.      Lumbar back: Tenderness present. No swelling, edema, deformity, signs of trauma, lacerations, spasms or bony tenderness. Decreased range of motion.      Right lower le+      Comments: Baseline history of back pain managed with injections and pain management   Lymphadenopathy:      Head:      Right side of head: No submental, submandibular or tonsillar adenopathy.      Left side of head: No submental, submandibular or tonsillar adenopathy.      Cervical: No cervical adenopathy.   Skin:     General: Skin is warm and dry.      Capillary Refill: Capillary refill takes less  than 2 seconds.      Coloration: Skin is not ashen, cyanotic, jaundiced, mottled, pale or sallow.   Neurological:      General: No focal deficit present.      Mental Status: She is alert and oriented to person, place, and time. Mental status is at baseline.      Motor: Motor function is intact. Weakness present.      Coordination: Coordination is intact.      Gait: Gait abnormal.      Comments: Recent knee surgery still has slight stiffness in knee    Psychiatric:         Attention and Perception: Attention and perception normal.         Mood and Affect: Mood and affect normal.         Speech: Speech normal.         Behavior: Behavior normal. Behavior is cooperative.         Thought Content: Thought content normal.         Cognition and Memory: Cognition and memory normal.         Judgment: Judgment normal.        Result Review :  The following data was reviewed by: SYMONE Rivera on 10/18/2024:  CMP          10/18/2024    08:35   CMP   Glucose 156    BUN 13    Creatinine 0.69    EGFR 109.9    Sodium 136    Potassium 4.4    Chloride 104    Calcium 10.0    Total Protein 7.3    Albumin 4.4    Globulin 2.9    Total Bilirubin 0.3    Alkaline Phosphatase 58    AST (SGOT) 18    ALT (SGPT) 22    Albumin/Globulin Ratio 1.5    BUN/Creatinine Ratio 18.8    Anion Gap 11.6      CBC          4/1/2024    12:11 4/17/2024    08:46 10/18/2024    08:35   CBC   WBC 5.14     10.86     8.76    RBC 4.30     4.35     4.59    Hemoglobin 12.8     12.8     14.3    Hematocrit 39.0     39.3     39.9    MCV 90.7     90.3     86.9    MCH 29.8     29.4     31.2    MCHC 32.8     32.6     35.8    RDW 12.9     12.8     11.9    Platelets 349     316     295       Details          This result is from an external source.             CBC w/diff          1/28/2024    07:56 4/1/2024    12:11 4/17/2024    08:46   CBC w/Diff   WBC  5.14     10.86       RBC 4.41     4.30     4.35       Hemoglobin 13.1     12.8     12.8       Hematocrit 38.2     39.0      39.3       MCV 86.6     90.7     90.3       MCH 29.7     29.8     29.4       MCHC 34.3     32.8     32.6       RDW  12.9     12.8       Platelets  349     316       Neutrophil Rel %  52.5     72.1       Immature Granulocyte Rel % 0.3     1.8     1.3       Lymphocyte Rel %  34.6     18.3       Monocyte Rel %  9.1     7.6       Eosinophil Rel %  1.0     0.3       Basophil Rel %  1.0     0.4          Details          This result is from an external source.             Lipid Panel          10/18/2024    08:35   Lipid Panel   Total Cholesterol 303    Triglycerides 202    HDL Cholesterol 68    VLDL Cholesterol 38    LDL Cholesterol  197    LDL/HDL Ratio 2.86      TSH          10/18/2024    08:35   TSH   TSH 0.730                Assessment and Plan   Diagnoses and all orders for this visit:    1. Annual physical exam (Primary)  Assessment & Plan:  Anticipatory guidance given regarding health prevention/wellness, diet/exercise, tobacco/alcohol/drug education, exercise and wellbeing, covid 19 guidance, vaccination recommendations, and sexual health/STD education.   Recommended bi-yearly dental exams and regular vision examinations.    Patient Counseling:    Diet:    Eat vegetables, fruits, whole grain, low-fat dairy, poultry, fish, beans, nontropical vegetable oils, and nuts, but avoid red meat (i.e., Mediterranean-style diet, DASH [Dietary Approaches to Stop Hypertension] diet).  Limit sugary drinks and sweets.  Limit saturated and trans fat to 5% to 6% of calories.  Limit sodium intake to 2,400 mg daily (about one teaspoon table salt [kosher/sea salt have less sodium per teaspoon]).    Exercise:   Engage in moderate-to-vigorous aerobic activity for at least 40 minutes (on average) three to four times each week.    Weight loss / Calorie Counting Apps:    Lose It!   MyFitness Pal   Works great when you try it with a partner/ friend    Wearables:   Activity tracker   Step tracker     Skin Care:  Practice Safe Sun  Use sun  screen SPF >30 daily  Protect your eyes with sunglasses   Dermatologist for skin check regularly    Bone Health:   https://www.nof.org/patients/treatment/nutrition/    Substance Abuse:   Discussed cessation/primary prevention of tobacco, alcohol, or other drug use; driving or other dangerous activities under the influence; availability of treatment for abuse.     Sexuality:   Discussed sexually transmitted diseases, partner selection, use of condoms, avoidance of unintended pregnancy, sexuality  and contraceptive alternatives.     Injury prevention:   Discussed safety belts, safety helmets, smoke detector, smoking near bedding or upholstery.     Dental health:   Discussed importance of regular tooth brushing, flossing, and dental visits.    Colon Cancer screen (discussed benefits of screening colonoscopy)  Should usually start at 45 yoa.     Immunizations reviewed  Recommend Shingles vaccine at age 50 (Shingrix which is 2 doses)     Living Will  https://ag.[place].gov/publications/AG%20Publications/livingwillpacket.pdf        2. Routine health maintenance  -     CBC & Differential  -     Comprehensive Metabolic Panel  -     Lipid Panel  -     TSH Rfx On Abnormal To Free T4    3. Plantar fasciitis of left foot  Assessment & Plan:  Please follow up with Podiatry as directed.   Please use methods like the frozen bottle method, Voltaren Gel daily and insoles to help eliminate pain and discomfort      4. Plantar fasciitis of right foot  Assessment & Plan:  Please follow up with Podiatry as directed.   Please use methods like the frozen bottle method, Voltaren Gel daily and insoles to help eliminate pain and discomfort      5. Hyperlipidemia, unspecified hyperlipidemia type  Assessment & Plan:   Lipid abnormalities are stable    Plan:  Lipid lowering therapy not prescribed due to lifestyle modifications    Counseled patient on lifestyle modifications to help control hyperlipidemia.   Cholesterol lowering dietary information  shared with patient.  Advised patient to exercise for 150 minutes weekly. (30 minute brisk walk, 5 days a week for example)  Weight Loss encouraged    Patient Treatment Goals:   LDL goal is under 100  Total Cholesterol Goal is less than 180    Followup in 6 months  .    Orders:  -     Hemoglobin A1c    6. Essential hypertension  Assessment & Plan:  Hypertension is stable and controlled  Continue current treatment regimen.  Dietary sodium restriction.  Weight loss.  Regular aerobic exercise.  Ambulatory blood pressure monitoring.  Blood pressure will be reassessed in 6 months.    Orders:  -     CBC & Differential  -     Comprehensive Metabolic Panel  -     Hemoglobin A1c    7. Class 1 obesity due to excess calories without serious comorbidity with body mass index (BMI) of 30.0 to 30.9 in adult  Assessment & Plan:  Continue to follow up with Dr Osman office for bariatric management and weight loss management medications.   Per CDC recommendations each week the average adult needs 150 minutes (30 min workouts 3-5 times a week) of moderate - intensity physical activity and 2 days of muscle strengthening.       Orders:  -     Hemoglobin A1c    8. Screening for cardiovascular condition    9. Vitamin D deficiency  -     Vitamin D 25 hydroxy    10. Screening mammogram for breast cancer  -     Mammo screening digital tomosynthesis bilateral w CAD; Future    Please complete your lab work today. Following review of results our office will be in contact with you for follow up care, medication changes or further instructions if needed. At that time if we need to schedule a follow up appointment one will be made at the time of the call.    Today we have placed a referral or ordered further testing:     A team member from [facility] will contact you within the next 3-5 business days to schedule your tests or referral.    If you have not heard them within this time frame, they can be contacted at [phone]  [phone]    If it was an outside referral: contact our office if you have not been contacted for appointment after 7-10 business days.      Thank you for allowing us to care for you,  SYMONE Rivera         Follow Up   Return in about 6 months (around 4/18/2025) for Recheck.  Patient was given instructions and counseling regarding her condition or for health maintenance advice. Please see specific information pulled into the AVS if appropriate.

## 2024-10-18 NOTE — PATIENT INSTRUCTIONS
Preventive Care 21-39 Years Old, Female  Preventive care refers to lifestyle choices and visits with your health care provider that can promote health and wellness. Preventive care visits are also called wellness exams.  What can I expect for my preventive care visit?  Counseling  During your preventive care visit, your health care provider may ask about your:  Medical history, including:  Past medical problems.  Family medical history.  Pregnancy history.  Current health, including:  Menstrual cycle.  Method of birth control.  Emotional well-being.  Home life and relationship well-being.  Sexual activity and sexual health.  Lifestyle, including:  Alcohol, nicotine or tobacco, and drug use.  Access to firearms.  Diet, exercise, and sleep habits.  Work and work environment.  Sunscreen use.  Safety issues such as seatbelt and bike helmet use.  Physical exam  Your health care provider may check your:  Height and weight. These may be used to calculate your BMI (body mass index). BMI is a measurement that tells if you are at a healthy weight.  Waist circumference. This measures the distance around your waistline. This measurement also tells if you are at a healthy weight and may help predict your risk of certain diseases, such as type 2 diabetes and high blood pressure.  Heart rate and blood pressure.  Body temperature.  Skin for abnormal spots.  What immunizations do I need?    Vaccines are usually given at various ages, according to a schedule. Your health care provider will recommend vaccines for you based on your age, medical history, and lifestyle or other factors, such as travel or where you work.  What tests do I need?  Screening  Your health care provider may recommend screening tests for certain conditions. This may include:  Pelvic exam and Pap test.  Lipid and cholesterol levels.  Diabetes screening. This is done by checking your blood sugar (glucose) after you have not eaten for a while (fasting).  Hepatitis  B test.  Hepatitis C test.  HIV (human immunodeficiency virus) test.  STI (sexually transmitted infection) testing, if you are at risk.  BRCA-related cancer screening. This may be done if you have a family history of breast, ovarian, tubal, or peritoneal cancers.  Talk with your health care provider about your test results, treatment options, and if necessary, the need for more tests.  Follow these instructions at home:  Eating and drinking    Eat a healthy diet that includes fresh fruits and vegetables, whole grains, lean protein, and low-fat dairy products.  Take vitamin and mineral supplements as recommended by your health care provider.  Do not drink alcohol if:  Your health care provider tells you not to drink.  You are pregnant, may be pregnant, or are planning to become pregnant.  If you drink alcohol:  Limit how much you have to 0-1 drink a day.  Know how much alcohol is in your drink. In the [place] one drink equals one 12 oz bottle of beer (355 mL), one 5 oz glass of wine (148 mL), or one 1½ oz glass of hard liquor (44 mL).  Lifestyle  Brush your teeth every morning and night with fluoride toothpaste. Floss one time each day.  Exercise for at least 30 minutes 5 or more days each week.  Do not use any products that contain nicotine or tobacco. These products include cigarettes, chewing tobacco, and vaping devices, such as e-cigarettes. If you need help quitting, ask your health care provider.  Do not use drugs.  If you are sexually active, practice safe sex. Use a condom or other form of protection to prevent STIs.  If you do not wish to become pregnant, use a form of birth control. If you plan to become pregnant, see your health care provider for a prepregnancy visit.  Find healthy ways to manage stress, such as:  Meditation, yoga, or listening to music.  Journaling.  Talking to a trusted person.  Spending time with friends and family.  Minimize exposure to UV radiation to reduce your risk of skin  cancer.  Safety  Always wear your seat belt while driving or riding in a vehicle.  Do not drive:  If you have been drinking alcohol. Do not ride with someone who has been drinking.  If you have been using any mind-altering substances or drugs.  While texting.  When you are tired or distracted.  Wear a helmet and other protective equipment during sports activities.  If you have firearms in your house, make sure you follow all gun safety procedures.  Seek help if you have been physically or sexually abused.  What's next?  Go to your health care provider once a year for an annual wellness visit.  Ask your health care provider how often you should have your eyes and teeth checked.  Stay up to date on all vaccines.  This information is not intended to replace advice given to you by your health care provider. Make sure you discuss any questions you have with your health care provider.  Document Revised: 06/15/2022 Document Reviewed: 06/15/2022  [employer] Patient Education © 2024 [employer]    Preventive Care 21-39 Years Old, Female  Preventive care refers to lifestyle choices and visits with your health care provider that can promote health and wellness. Preventive care visits are also called wellness exams.  What can I expect for my preventive care visit?  Counseling  During your preventive care visit, your health care provider may ask about your:  Medical history, including:  Past medical problems.  Family medical history.  Pregnancy history.  Current health, including:  Menstrual cycle.  Method of birth control.  Emotional well-being.  Home life and relationship well-being.  Sexual activity and sexual health.  Lifestyle, including:  Alcohol, nicotine or tobacco, and drug use.  Access to firearms.  Diet, exercise, and sleep habits.  Work and work environment.  Sunscreen use.  Safety issues such as seatbelt and bike helmet use.  Physical exam  Your health care provider may check your:  Height and weight. These may be  used to calculate your BMI (body mass index). BMI is a measurement that tells if you are at a healthy weight.  Waist circumference. This measures the distance around your waistline. This measurement also tells if you are at a healthy weight and may help predict your risk of certain diseases, such as type 2 diabetes and high blood pressure.  Heart rate and blood pressure.  Body temperature.  Skin for abnormal spots.  What immunizations do I need?    Vaccines are usually given at various ages, according to a schedule. Your health care provider will recommend vaccines for you based on your age, medical history, and lifestyle or other factors, such as travel or where you work.  What tests do I need?  Screening  Your health care provider may recommend screening tests for certain conditions. This may include:  Pelvic exam and Pap test.  Lipid and cholesterol levels.  Diabetes screening. This is done by checking your blood sugar (glucose) after you have not eaten for a while (fasting).  Hepatitis B test.  Hepatitis C test.  HIV (human immunodeficiency virus) test.  STI (sexually transmitted infection) testing, if you are at risk.  BRCA-related cancer screening. This may be done if you have a family history of breast, ovarian, tubal, or peritoneal cancers.  Talk with your health care provider about your test results, treatment options, and if necessary, the need for more tests.  Follow these instructions at home:  Eating and drinking    Eat a healthy diet that includes fresh fruits and vegetables, whole grains, lean protein, and low-fat dairy products.  Take vitamin and mineral supplements as recommended by your health care provider.  Do not drink alcohol if:  Your health care provider tells you not to drink.  You are pregnant, may be pregnant, or are planning to become pregnant.  If you drink alcohol:  Limit how much you have to 0-1 drink a day.  Know how much alcohol is in your drink. In the [place] one drink equals one 12  oz bottle of beer (355 mL), one 5 oz glass of wine (148 mL), or one 1½ oz glass of hard liquor (44 mL).  Lifestyle  Brush your teeth every morning and night with fluoride toothpaste. Floss one time each day.  Exercise for at least 30 minutes 5 or more days each week.  Do not use any products that contain nicotine or tobacco. These products include cigarettes, chewing tobacco, and vaping devices, such as e-cigarettes. If you need help quitting, ask your health care provider.  Do not use drugs.  If you are sexually active, practice safe sex. Use a condom or other form of protection to prevent STIs.  If you do not wish to become pregnant, use a form of birth control. If you plan to become pregnant, see your health care provider for a prepregnancy visit.  Find healthy ways to manage stress, such as:  Meditation, yoga, or listening to music.  Journaling.  Talking to a trusted person.  Spending time with friends and family.  Minimize exposure to UV radiation to reduce your risk of skin cancer.  Safety  Always wear your seat belt while driving or riding in a vehicle.  Do not drive:  If you have been drinking alcohol. Do not ride with someone who has been drinking.  If you have been using any mind-altering substances or drugs.  While texting.  When you are tired or distracted.  Wear a helmet and other protective equipment during sports activities.  If you have firearms in your house, make sure you follow all gun safety procedures.  Seek help if you have been physically or sexually abused.  What's next?  Go to your health care provider once a year for an annual wellness visit.  Ask your health care provider how often you should have your eyes and teeth checked.  Stay up to date on all vaccines.  This information is not intended to replace advice given to you by your health care provider. Make sure you discuss any questions you have with your health care provider.  Document Revised: 06/15/2022 Document Reviewed:  06/15/2022  [employer] Patient Education © 2024 [employer]      Clinical References    Exercising to Lose Weight  Getting regular exercise is important for everyone. It is especially important if you are overweight. Being overweight increases your risk of heart disease, stroke, diabetes, high blood pressure, and several types of cancer. Exercising, and reducing the calories you consume, can help you lose weight and improve fitness and health.  Exercise can be moderate or vigorous intensity. To lose weight, most people need to do a certain amount of moderate or vigorous-intensity exercise each week.  How can exercise affect me?  You lose weight when you exercise enough to burn more calories than you eat. Exercise also reduces body fat and builds muscle. The more muscle you have, the more calories you burn. Exercise also:  Improves mood.  Reduces stress and tension.  Improves your overall fitness, flexibility, and endurance.  Increases bone strength.  Moderate-intensity exercise    Moderate-intensity exercise is any activity that gets you moving enough to burn at least three times more energy (calories) than if you were sitting.  Examples of moderate exercise include:  Walking a mile in 15 minutes.  Doing light yard work.  Biking at an easy pace.  Most people should get at least 150 minutes of moderate-intensity exercise a week to maintain their body weight.  Vigorous-intensity exercise  Vigorous-intensity exercise is any activity that gets you moving enough to burn at least six times more calories than if you were sitting. When you exercise at this intensity, you should be working hard enough that you are not able to carry on a conversation.  Examples of vigorous exercise include:  Running.  Playing a team sport, such as football, basketball, and soccer.  Jumping rope.  Most people should get at least 75 minutes a week of vigorous exercise to maintain their body weight.  What actions can I take to lose weight?  The  amount of exercise you need to lose weight depends on:  Your age.  The type of exercise.  Any health conditions you have.  Your overall physical ability.  Talk to your health care provider about how much exercise you need and what types of activities are safe for you.  Nutrition    Make changes to your diet as told by your health care provider or diet and nutrition specialist (dietitian). This may include:  Eating fewer calories.  Eating more protein.  Eating less unhealthy fats.  Eating a diet that includes fresh fruits and vegetables, whole grains, low-fat dairy products, and lean protein.  Avoiding foods with added fat, salt, and sugar.  Drink plenty of water while you exercise to prevent dehydration or heat stroke.  Activity  Choose an activity that you enjoy and set realistic goals. Your health care provider can help you make an exercise plan that works for you.  Exercise at a moderate or vigorous intensity most days of the week.  The intensity of exercise may vary from person to person. You can tell how intense a workout is for you by paying attention to your breathing and heartbeat. Most people will notice their breathing and heartbeat get faster with more intense exercise.  Do resistance training twice each week, such as:  Push-ups.  Sit-ups.  Lifting weights.  Using resistance bands.  Getting short amounts of exercise can be just as helpful as long, structured periods of exercise. If you have trouble finding time to exercise, try doing these things as part of your daily routine:  Get up, stretch, and walk around every 30 minutes throughout the day.  Go for a walk during your lunch break.  Park your car farther away from your destination.  If you take public transportation, get off one stop early and walk the rest of the way.  Make phone calls while standing up and walking around.  Take the stairs instead of elevators or escalators.  Wear comfortable clothes and shoes with good support.  Do not exercise so  much that you hurt yourself, feel dizzy, or get very short of breath.  Where to find more information  [place] Department of Health and Human Services: www.hhs.gov  Centers for Disease Control and Prevention: www.cdc.gov  Contact a health care provider:  Before starting a new exercise program.  If you have questions or concerns about your weight.  If you have a medical problem that keeps you from exercising.  Get help right away if:  You have any of the following while exercising:  Injury.  Dizziness.  Difficulty breathing or shortness of breath that does not go away when you stop exercising.  Chest pain.  Rapid heartbeat.  These symptoms may represent a serious problem that is an emergency. Do not wait to see if the symptoms will go away. Get medical help right away. Call your local emergency services (911 in the [place] Do not drive yourself to the hospital.  Summary  Getting regular exercise is especially important if you are overweight.  Being overweight increases your risk of heart disease, stroke, diabetes, high blood pressure, and several types of cancer.  Losing weight happens when you burn more calories than you eat.  Reducing the amount of calories you eat, and getting regular moderate or vigorous exercise each week, helps you lose weight.  This information is not intended to replace advice given to you by your health care provider. Make sure you discuss any questions you have with your health care provider.  Document Revised: 02/13/2022 Document Reviewed: 02/13/2022  [employer] Patient Education © 2024 [employer]     Obesity, Adult  Obesity is having too much body fat. Being obese means that your weight is more than what is healthy for you.   BMI (body mass index) is a number that explains how much body fat you have. If you have a BMI of 30 or more, you are obese.  Obesity can cause serious health problems, such as:  Stroke.  Coronary artery disease (CAD).  Type 2 diabetes.  Some types of cancer.  High blood  pressure (hypertension).  High cholesterol.  Gallbladder stones.  Obesity can also contribute to:  Osteoarthritis.  Sleep apnea.  Infertility problems.  What are the causes?  Eating meals each day that are high in calories, sugar, and fat.  Drinking a lot of drinks that have sugar in them.  Being born with genes that may make you more likely to become obese.  Having a medical condition that causes obesity.  Taking certain medicines.  Sitting a lot (having a sedentary lifestyle).  Not getting enough sleep.  What increases the risk?  Having a family history of obesity.  Living in an area with limited access to:  [place], recreation centers, or sidewalks.  Healthy food choices, such as grocery stores and farmers' markets.  What are the signs or symptoms?  The main sign is having too much body fat.  How is this treated?  Treatment for this condition often includes changing your lifestyle. Treatment may include:  Changing your diet. This may include making a healthy meal plan.  Exercise. This may include activity that causes your heart to beat faster (aerobic exercise) and strength training. Work with your doctor to design a program that works for you.  Medicine to help you lose weight. This may be used if you are not able to lose one pound a week after 6 weeks of healthy eating and more exercise.  Treating conditions that cause the obesity.  Surgery. Options may include gastric banding and gastric bypass. This may be done if:  Other treatments have not helped to improve your condition.  You have a BMI of 40 or higher.  You have life-threatening health problems related to obesity.  Follow these instructions at home:  Eating and drinking    Follow advice from your doctor about what to eat and drink. Your doctor may tell you to:  Limit fast food, sweets, and processed snack foods.  Choose low-fat options. For example, choose low-fat milk instead of whole milk.  Eat five or more servings of fruits or vegetables each day.  Eat  at home more often. This gives you more control over what you eat.  Choose healthy foods when you eat out.  Learn to read food labels. This will help you learn how much food is in one serving.  Keep low-fat snacks available.  Avoid drinks that have a lot of sugar in them. These include soda, fruit juice, iced tea with sugar, and flavored milk.  Drink enough water to keep your pee (urine) pale yellow.  Do not go on fad diets.  Physical activity  Exercise often, as told by your doctor. Most adults should get up to 150 minutes of moderate-intensity exercise every week.Ask your doctor:  What types of exercise are safe for you.  How often you should exercise.  Warm up and stretch before being active.  Do slow stretching after being active (cool down).  Rest between times of being active.  Lifestyle  Work with your doctor and a food expert (dietitian) to set a weight-loss goal that is best for you.  Limit your screen time.  Find ways to reward yourself that do not involve food.  Do not drink alcohol if:  Your doctor tells you not to drink.  You are pregnant, may be pregnant, or are planning to become pregnant.  If you drink alcohol:  Limit how much you have to:  0-1 drink a day for women.  0-2 drinks a day for men.  Know how much alcohol is in your drink. In the [place] one drink equals one 12 oz bottle of beer (355 mL), one 5 oz glass of wine (148 mL), or one 1½ oz glass of hard liquor (44 mL).  General instructions  Keep a weight-loss journal. This can help you keep track of:  The food that you eat.  How much exercise you get.  Take over-the-counter and prescription medicines only as told by your doctor.  Take vitamins and supplements only as told by your doctor.  Think about joining a support group.  Pay attention to your mental health as obesity can lead to depression or self esteem issues.  Keep all follow-up visits.  Contact a doctor if:  You cannot meet your weight-loss goal after you have changed your diet and  lifestyle for 6 weeks.  You are having trouble breathing.  Summary  Obesity is having too much body fat.  Being obese means that your weight is more than what is healthy for you.  Work with your doctor to set a weight-loss goal.  Get regular exercise as told by your doctor.  This information is not intended to replace advice given to you by your health care provider. Make sure you discuss any questions you have with your health care provider.  Document Revised: 07/26/2022 Document Reviewed: 07/26/2022  [employer] Patient Education © 2024 [employer]

## 2024-10-22 ENCOUNTER — HOSPITAL ENCOUNTER (OUTPATIENT)
Dept: GENERAL RADIOLOGY | Facility: HOSPITAL | Age: 44
Discharge: HOME | End: 2024-10-22
Payer: OTHER GOVERNMENT

## 2024-10-22 DIAGNOSIS — M79.671 PAIN IN BOTH FEET: ICD-10-CM

## 2024-10-22 DIAGNOSIS — M79.672 PAIN IN BOTH FEET: ICD-10-CM

## 2024-10-22 DIAGNOSIS — M72.2 PLANTAR FASCIITIS, BILATERAL: ICD-10-CM

## 2024-10-22 DIAGNOSIS — M72.2 PLANTAR FASCIITIS, BILATERAL: Primary | ICD-10-CM

## 2024-10-22 PROCEDURE — 73630 X-RAY EXAM OF FOOT: CPT

## 2024-10-23 RX ORDER — ERGOCALCIFEROL 1.25 MG/1
50000 CAPSULE, LIQUID FILLED ORAL WEEKLY
Qty: 13 CAPSULE | Refills: 0 | Status: SHIPPED | OUTPATIENT
Start: 2024-10-23

## 2024-10-23 NOTE — PROGRESS NOTES
Ms. Art,  I reviewed your lab work. Your CMP shows normal liver and kidney function. Your thyroid function is within normal range. Your diabetic A1C screening shows no evidence of diabetes at this time. Your cmp shows elevation in numbers that are more than likely related to your routine injections. I have refilled your vit D to keep it within normal range.     Your cholesterol is elevated. LDL goal is under 100. Triglyceride goal is under 150. I recommend following a lower saturated fat and lower refined carbohydrate diet.  Please work on dietary changes such as reducing added sugars, decreasing saturated fats, and increase foods with omega-3 fatty acids. Also, engaging in 30 minutes of exercise 5 times per week can improve your cholesterol.     Your personal 10-year ASCVD risk score (Mery DK, et al., 2019) is: 1.1%. We do not start statin therapy until risk is greater than 7%.      Values used to calculate the score:      Age: 44 years      Sex: Female      Is Non- : No      Diabetic: No      Tobacco smoker: No      Systolic Blood Pressure: 126 mmHg      Is BP treated: No      HDL Cholesterol: 68 mg/dL      Total Cholesterol: 303 mg/dL    Please let us know if you have any further questions or concerns.  Thank you for allowing us to care for you,  SYMONE Rivera

## 2024-10-30 DIAGNOSIS — Z76.89 ENCOUNTER TO ESTABLISH CARE: Primary | ICD-10-CM

## 2024-10-31 PROBLEM — M79.7 FIBROMYALGIA: Status: ACTIVE | Noted: 2024-10-31

## 2024-10-31 PROBLEM — N39.3 URINARY, INCONTINENCE, STRESS FEMALE: Status: ACTIVE | Noted: 2024-10-31

## 2024-10-31 PROBLEM — K22.70 BARRETT'S ESOPHAGUS: Status: ACTIVE | Noted: 2024-10-31

## 2024-10-31 PROBLEM — E66.3 OVERWEIGHT (BMI 25.0-29.9): Status: ACTIVE | Noted: 2024-10-31

## 2024-11-03 PROBLEM — Z00.00 ANNUAL PHYSICAL EXAM: Status: ACTIVE | Noted: 2024-11-03

## 2024-11-04 PROBLEM — Z98.890 POSTOPERATIVE STATE: Status: RESOLVED | Noted: 2020-10-29 | Resolved: 2024-11-04

## 2024-11-04 PROBLEM — R63.8 INCREASED BODY MASS INDEX: Status: RESOLVED | Noted: 2022-04-21 | Resolved: 2024-11-04

## 2024-11-04 PROBLEM — E66.9 OBESITY: Status: RESOLVED | Noted: 2022-03-23 | Resolved: 2024-11-04

## 2024-11-04 PROBLEM — Z00.00 ANNUAL PHYSICAL EXAM: Status: RESOLVED | Noted: 2024-11-03 | Resolved: 2024-11-04

## 2024-11-04 PROBLEM — Z13.6 SCREENING FOR CARDIOVASCULAR CONDITION: Status: RESOLVED | Noted: 2024-10-18 | Resolved: 2024-11-04

## 2024-11-04 PROBLEM — E66.3 OVERWEIGHT: Status: RESOLVED | Noted: 2022-04-14 | Resolved: 2024-11-04

## 2024-11-04 NOTE — ASSESSMENT & PLAN NOTE
Please follow up with Podiatry as directed.   Please use methods like the frozen bottle method, Voltaren Gel daily and insoles to help eliminate pain and discomfort

## 2024-11-04 NOTE — ASSESSMENT & PLAN NOTE
Anticipatory guidance given regarding health prevention/wellness, diet/exercise, tobacco/alcohol/drug education, exercise and wellbeing, covid 19 guidance, vaccination recommendations, and sexual health/STD education.   Recommended bi-yearly dental exams and regular vision examinations.    Patient Counseling:    Diet:    Eat vegetables, fruits, whole grain, low-fat dairy, poultry, fish, beans, nontropical vegetable oils, and nuts, but avoid red meat (i.e., Mediterranean-style diet, DASH [Dietary Approaches to Stop Hypertension] diet).  Limit sugary drinks and sweets.  Limit saturated and trans fat to 5% to 6% of calories.  Limit sodium intake to 2,400 mg daily (about one teaspoon table salt [kosher/sea salt have less sodium per teaspoon]).    Exercise:   Engage in moderate-to-vigorous aerobic activity for at least 40 minutes (on average) three to four times each week.    Weight loss / Calorie Counting Apps:    Lose It!   MyFitPixowl Pal   Works great when you try it with a partner/ friend    Wearables:   Activity tracker   Step tracker     Skin Care:  Practice Safe Sun  Use sun screen SPF >30 daily  Protect your eyes with sunglasses   Dermatologist for skin check regularly    Bone Health:   https://www.nof.org/patients/treatment/nutrition/    Substance Abuse:   Discussed cessation/primary prevention of tobacco, alcohol, or other drug use; driving or other dangerous activities under the influence; availability of treatment for abuse.     Sexuality:   Discussed sexually transmitted diseases, partner selection, use of condoms, avoidance of unintended pregnancy, sexuality  and contraceptive alternatives.     Injury prevention:   Discussed safety belts, safety helmets, smoke detector, smoking near bedding or upholstery.     Dental health:   Discussed importance of regular tooth brushing, flossing, and dental visits.    Colon Cancer screen (discussed benefits of screening colonoscopy)  Should usually start at 45 yoa.      Immunizations reviewed  Recommend Shingles vaccine at age 50 (Shingrix which is 2 doses)     Living Will  https://[place].gov/publications/[place]%20Publications/[email or web address]

## 2024-11-04 NOTE — ASSESSMENT & PLAN NOTE
Continue to follow up with Dr Osman office for bariatric management and weight loss management medications.   Per CDC recommendations each week the average adult needs 150 minutes (30 min workouts 3-5 times a week) of moderate - intensity physical activity and 2 days of muscle strengthening.

## 2024-11-04 NOTE — ASSESSMENT & PLAN NOTE
Lipid abnormalities are stable    Plan:  Lipid lowering therapy not prescribed due to lifestyle modifications    Counseled patient on lifestyle modifications to help control hyperlipidemia.   Cholesterol lowering dietary information shared with patient.  Advised patient to exercise for 150 minutes weekly. (30 minute brisk walk, 5 days a week for example)  Weight Loss encouraged    Patient Treatment Goals:   LDL goal is under 100  Total Cholesterol Goal is less than 180    Followup in 6 months  .

## 2024-11-12 ENCOUNTER — LAB (OUTPATIENT)
Dept: LAB | Facility: HOSPITAL | Age: 44
End: 2024-11-12
Payer: COMMERCIAL

## 2024-11-12 ENCOUNTER — PATIENT ROUNDING (BHMG ONLY) (OUTPATIENT)
Dept: OBSTETRICS AND GYNECOLOGY | Facility: CLINIC | Age: 44
End: 2024-11-12
Payer: OTHER GOVERNMENT

## 2024-11-12 VITALS
HEIGHT: 65 IN | SYSTOLIC BLOOD PRESSURE: 124 MMHG | BODY MASS INDEX: 27.32 KG/M2 | DIASTOLIC BLOOD PRESSURE: 76 MMHG | WEIGHT: 164 LBS

## 2024-11-12 DIAGNOSIS — N95.1 VASOMOTOR SYMPTOMS DUE TO MENOPAUSE: ICD-10-CM

## 2024-11-12 DIAGNOSIS — N94.2 VAGINISMUS: ICD-10-CM

## 2024-11-12 DIAGNOSIS — Z13.89 SCREENING FOR GENITOURINARY CONDITION: Primary | ICD-10-CM

## 2024-11-12 LAB
ESTRADIOL SERPL HS-MCNC: 179 PG/ML
FSH SERPL-ACNC: 3.47 MIU/ML

## 2024-11-12 PROCEDURE — 36415 COLL VENOUS BLD VENIPUNCTURE: CPT

## 2024-11-12 PROCEDURE — 83001 ASSAY OF GONADOTROPIN (FSH): CPT

## 2024-11-12 PROCEDURE — 82670 ASSAY OF TOTAL ESTRADIOL: CPT

## 2024-11-12 RX ORDER — HYDROXYZINE HYDROCHLORIDE 10 MG/1
10 TABLET, FILM COATED ORAL EVERY 6 HOURS PRN
Qty: 60 TABLET | Refills: 1 | Status: SHIPPED | OUTPATIENT
Start: 2024-11-12

## 2024-11-12 RX ORDER — AMANTADINE HYDROCHLORIDE 100 MG/1
TABLET ORAL
COMMUNITY
Start: 2024-10-31

## 2024-11-13 ENCOUNTER — PATIENT ROUNDING (BHMG ONLY) (OUTPATIENT)
Dept: OBSTETRICS AND GYNECOLOGY | Facility: CLINIC | Age: 44
End: 2024-11-13
Payer: OTHER GOVERNMENT

## 2024-11-13 ENCOUNTER — HOSPITAL ENCOUNTER (OUTPATIENT)
Dept: MAMMOGRAPHY | Facility: HOSPITAL | Age: 44
Discharge: HOME | End: 2024-11-13
Payer: OTHER GOVERNMENT

## 2024-11-13 DIAGNOSIS — Z12.31 SCREENING MAMMOGRAM FOR BREAST CANCER: ICD-10-CM

## 2024-11-13 PROCEDURE — 77067 SCR MAMMO BI INCL CAD: CPT

## 2024-11-13 PROCEDURE — 77063 BREAST TOMOSYNTHESIS BI: CPT | Performed by: RADIOLOGY

## 2024-11-13 PROCEDURE — 77067 SCR MAMMO BI INCL CAD: CPT | Performed by: RADIOLOGY

## 2024-11-13 PROCEDURE — 77063 BREAST TOMOSYNTHESIS BI: CPT

## 2024-11-13 NOTE — PROGRESS NOTES
43yo  with Hx of robotic TLH, BS in  with concern for menopause. Since her delivery she has had vasomotor symptoms. Has been worked up fro menopause and labs have been negative. Interestingly enough; no hot flashes the last 1-2 months. Denies vaginal dryness or irritability. Has always had insomnia and used multiple agents to assist with sometimes no relief.      PMH:   Past Medical History:   Diagnosis Date    Anxiety     Depression     Hypertension     Migraines                 PSH:     Past Surgical History:   Procedure Laterality Date    ELBOW PROCEDURE Right     elbow repair    FOOT SURGERY Right 2022    HEMORRHOIDECTOMY  2006    HYSTERECTOMY      KNEE SURGERY Left     LAPAROSCOPIC CHOLECYSTECTOMY              POB hx:   PGYN hx:  STD Denies   Pap ( Denies Abnormal, LEEP, [facility])  Cuff screen next appt ( )   Declines Gardasil   Contraception TLH    Menarche 14 yo   Menopause      Social hx:   Social History     Socioeconomic History    Marital status: Single   Tobacco Use    Smoking status: Never     Passive exposure: Never    Smokeless tobacco: Never   Vaping Use    Vaping status: Never Used   Substance and Sexual Activity    Alcohol use: Never    Drug use: Never    Sexual activity: Yes     Partners: Male     Birth control/protection: Hysterectomy        [  X ] no h/o abuse/DV:                   Allergies:       Allergies   Allergen Reactions    Adhesive Tape Rash    Latex Hives and Rash                  Meds:   Current Outpatient Medications:     amantadine (SYMMETREL) 100 MG tablet, , Disp: , Rfl:     Ascorbic Acid (Vitamin C) 500 MG capsule, Take 1 tablet twice a day by oral route for 14 days., Disp: , Rfl:     cyanocobalamin (VITAMIN B-12N) 50 MCG tablet, , Disp: , Rfl:     ondansetron (ZOFRAN) 4 MG tablet, Take 1 tablet by mouth Every 6 (Six) Hours As Needed for Nausea., Disp: , Rfl:     traMADol (ULTRAM) 50 MG tablet, Take 1 tablet by mouth Every 8 (Eight) Hours As Needed for  Moderate Pain., Disp: 90 tablet, Rfl: 0    vitamin D (ERGOCALCIFEROL) 1.25 MG (81100 UT) capsule capsule, Take 1 capsule by mouth once a week, Disp: 13 capsule, Rfl: 0    zolpidem CR (AMBIEN CR) 6.25 MG CR tablet, Take 1 tablet by mouth., Disp: , Rfl:     acetaminophen (Tylenol 8 Hour) 650 MG 8 hr tablet, , Disp: , Rfl:     baclofen (LIORESAL) 10 MG tablet, Take 1 tablet by mouth 3 (Three) Times a Day., Disp: , Rfl:     hydrOXYzine (ATARAX) 10 MG tablet, Take 1 tablet by mouth Every 6 (Six) Hours As Needed for Anxiety., Disp: 60 tablet, Rfl: 1    lidocaine (XYLOCAINE) 5 % ointment, APPLY TO AFFECTED AREA(S) BY TOPICAL ROUTE 1-4 TIMES DAILY AS NEEDED, Disp: , Rfl:     metroNIDAZOLE (METROCREAM) 0.75 % cream, APPLY A THIN LAYER TO RED AREAS ON FACE TWICE DAILY., Disp: , Rfl:     SUMAtriptan (IMITREX) 100 MG tablet, Take 1 tablet by mouth As Needed for Migraine for up to 30 days. Take one tablet at onset of headache. May repeat dose one time in 2 hours if headache not relieved., Disp: 30 tablet, Rfl: 0    tretinoin (RETIN-A) 0.025 % cream, APPLY A PEA SIZED AMOUNT TO THE ENITRE FACE EVERY THIRD NIGHT INCREASING TO NIGHTLY AS TOLERATED, Disp: , Rfl:     triamcinolone (KENALOG) 0.1 % cream, APPLY THIN LAYER TOPICALLY TO THE AFFECTED AREA TWICE DAILY AT ONSET OF FLARES FOR UP TO 2 WEEKS FOR ITCHING. AVOID USE ON FACE OR GROIN, Disp: , Rfl:     Valtrex 1 g tablet, Take 1 tablet by mouth Daily., Disp: 90 tablet, Rfl: 0     Review of Systems   See HPI     Vitals:    11/12/24 1011   BP: 124/76        Physical Exam  Constitutional:       Appearance: Normal appearance.   Abdominal:      General: Abdomen is flat.      Palpations: Abdomen is soft.   Neurological:      General: No focal deficit present.      Mental Status: She is alert and oriented to person, place, and time.   Skin:     General: Skin is warm and dry.   Psychiatric:         Mood and Affect: Mood normal.         Behavior: Behavior normal.      Defer Gyn exam until  next appt     Diagnoses and all orders for this visit:    1. Screening for genitourinary condition (Primary)  -     Cancel: POC Urinalysis Dipstick    2. Vasomotor symptoms due to menopause  -     Follicle Stimulating Hormone; Future  -     Estradiol; Future    3. Vaginismus  -     Ambulatory Referral to Physical Therapy for Evaluation & Treatment    Other orders  -     hydrOXYzine (ATARAX) 10 MG tablet; Take 1 tablet by mouth Every 6 (Six) Hours As Needed for Anxiety.  Dispense: 60 tablet; Refill: 1     We will consider  oral conjugated estrogen combined with micronized progesterone ( I am aware she has no uterus) ; awaiting labs   Atarax to assist with Insomnia   Cuff screen next appt  PFPT recommended s/p TLH with painful intercourse ; consulted     Wilbert Chi DO  11/12/2024    10:38 EST

## 2024-11-14 NOTE — PROGRESS NOTES
"  Ms. Art,     Your recent mammogram was normal.     It is recommended that we inform all patients of their breast density; your breast density is \"heterogeneously dense\" which means it may obscure small masses.      A good reference to explain dense breast further is: https://www.cancer.gov/types/breast/breast-changes/dense-breasts   For dense breast tissue: I recommended that you continue getting mammography with tomosynthesis in the future.    You should continue regular, monthly self-exams so that you are familiar with how your breasts normally look and feel and report any changes you notice.     Mammogram guidelines (average risks patients):     Age: 40-54 yearly mammograms  Age: 55-74 may switch to mammograms every other year if patient chooses and they are at normal risk with no prior abnormalities.   Age: 75 and older with average risks may choose to stop mammograms     Breast cancer risk factors:   Family history (Breast, Ovarian, Hereditary breast)   Prior breast biopsy with atypical hyperplasia (lobular or ductal)   Early menarche   Late menopause   Nullparity (never given birth to child)   Obesity, smoking, dense breast     Repeat mammogram due in one year unless new finding arises.     See you at your next visit and stay well,     SYMONE Rivera     "

## 2024-11-19 RX ORDER — ESTRADIOL AND LEVONORGESTREL .045; .015 MG/D; MG/D
1 PATCH TRANSDERMAL WEEKLY
Qty: 4 PATCH | Refills: 11 | Status: SHIPPED | OUTPATIENT
Start: 2024-11-19 | End: 2025-11-19

## 2024-11-20 ENCOUNTER — TRANSCRIBE ORDERS (OUTPATIENT)
Dept: MRI IMAGING | Facility: HOSPITAL | Age: 44
End: 2024-11-20
Payer: OTHER GOVERNMENT

## 2024-11-20 DIAGNOSIS — M79.642 LEFT HAND PAIN: Primary | ICD-10-CM

## 2024-11-20 DIAGNOSIS — M79.641 RIGHT HAND PAIN: Primary | ICD-10-CM

## 2024-11-20 RX ORDER — ESTRADIOL 0.1 MG/G
2 CREAM VAGINAL DAILY
Qty: 1 EACH | Refills: 12 | Status: SHIPPED | OUTPATIENT
Start: 2024-11-20

## 2024-11-27 PROBLEM — M54.30 SCIATICA: Status: ACTIVE | Noted: 2024-11-27

## 2024-12-02 ENCOUNTER — CONSULT (OUTPATIENT)
Dept: BARIATRICS/WEIGHT MGMT | Facility: CLINIC | Age: 44
End: 2024-12-02
Payer: OTHER GOVERNMENT

## 2024-12-02 VITALS
DIASTOLIC BLOOD PRESSURE: 73 MMHG | HEART RATE: 63 BPM | HEIGHT: 66 IN | SYSTOLIC BLOOD PRESSURE: 128 MMHG | TEMPERATURE: 97.5 F | WEIGHT: 167 LBS | BODY MASS INDEX: 26.84 KG/M2

## 2024-12-02 DIAGNOSIS — Z76.0 ENCOUNTER FOR MEDICATION REFILL: ICD-10-CM

## 2024-12-02 DIAGNOSIS — N39.46 MIXED STRESS AND URGE URINARY INCONTINENCE: ICD-10-CM

## 2024-12-02 DIAGNOSIS — F32.A DEPRESSIVE DISORDER: ICD-10-CM

## 2024-12-02 DIAGNOSIS — E66.3 OVERWEIGHT (BMI 25.0-29.9): Primary | ICD-10-CM

## 2024-12-02 DIAGNOSIS — K22.70 BARRETT'S ESOPHAGUS WITHOUT DYSPLASIA: ICD-10-CM

## 2024-12-02 DIAGNOSIS — I10 ESSENTIAL HYPERTENSION: ICD-10-CM

## 2024-12-02 DIAGNOSIS — M79.7 FIBROMYALGIA: ICD-10-CM

## 2024-12-02 DIAGNOSIS — M06.9 RHEUMATOID ARTHRITIS, INVOLVING UNSPECIFIED SITE, UNSPECIFIED WHETHER RHEUMATOID FACTOR PRESENT: ICD-10-CM

## 2024-12-02 DIAGNOSIS — R39.15 URINARY URGENCY: ICD-10-CM

## 2024-12-02 DIAGNOSIS — E78.5 HYPERLIPIDEMIA, UNSPECIFIED HYPERLIPIDEMIA TYPE: ICD-10-CM

## 2024-12-02 RX ORDER — ESTRADIOL AND LEVONORGESTREL .045; .015 MG/D; MG/D
1 PATCH TRANSDERMAL WEEKLY
Qty: 4 PATCH | Refills: 11 | Status: SHIPPED | OUTPATIENT
Start: 2024-12-02 | End: 2024-12-06

## 2024-12-02 NOTE — PROGRESS NOTES
[facility]  [address]  [facility] [id number] [phone]  [address]  [facility] [id number] [phone]  Dept: [phone]  12/2/2024      Leslie Art.  [id number]  [id number]  1980  female      Chief Complaint of weight gain; unable to maintain weight loss    History of Present Illness:   Leslie is a 44 y.o. female who presents today for evaluation, education and consultation regarding weight loss with the supervision of a medical specialist and registered dietitian.     Diet History:Leslie has been overweight for at least 20 years, has been 35 pounds or more overweight for at least 20 years and started dieting at age 20.  The most weight Leslie lost was 20 pounds with compounded semaglutide, a high protein diet, and exercise and maintained the weight loss for 6 months. Leslie describes her eating habits as primarily cooking and eating at home. She has gotten into the habit of leading with high protein, lean meats at mealtimes. Leslie Art has tried [place], Fasting, reduced calorie, and exercising among others with success of losing up to 20 pounds, but in each instance regained the weight.     See dietician documentation for complete history.    Bariatric Surgery Evaluation: The patient is being seen for an initial visit for weight evaluation and education.     Bariatric Co-morbidities:  hypertension, dyslipidemia, depression, and urinary stress incontinence    Patient Active Problem List   Diagnosis   • Allergic rhinitis   • Arthralgia of multiple joints   • Arthralgia of right ankle   • Arthralgia of right elbow   • Cervical dysplasia   • Congenital cavus deformity of foot   • Congenital pes cavus   • Depressive disorder   • Disorder of pharynx   • Dysphagia   • Dysmenorrhea, unspecified   • Elevated blood-pressure reading without diagnosis of hypertension   • Essential hypertension   •  Fatigue   • Flushing   • Herpes simplex   • Hypercholesterolemia   • Hyperlipidemia   • Hypothyroidism   • Increased frequency of urination   • Inflammation of peripheral nerve of right foot   • Insomnia   • Lateral epicondylitis of right elbow   • Migraine headache   • Mixed stress and urge urinary incontinence   • Plantar fascial fibromatosis   • Plantar fasciitis of left foot   • Plantar fasciitis of right foot   • Rheumatoid arthritis   • Rhytidosis facialis   • Rosacea   • Urinary urgency   • Degeneration of lumbar intervertebral disc   • Inflammation of sacroiliac joint   • Lumbar radiculopathy   • Vitamin D deficiency   • Li's esophagus   • Urinary, incontinence, stress female   • Fibromyalgia   • Overweight (BMI 25.0-29.9)   • Sciatica       Past Medical History:   Diagnosis Date   • Anxiety    • Attempted suicide    • Depression    • Hypertension    • Migraines        Past Surgical History:   Procedure Laterality Date   • AUGMENTATION MAMMAPLASTY Bilateral     2006   • ELBOW PROCEDURE Right 2022    elbow repair   • FOOT SURGERY Right 12/2022   • HEMORRHOIDECTOMY  2006   • HYSTERECTOMY  2021   • KNEE SURGERY Left 2024   • LAPAROSCOPIC CHOLECYSTECTOMY  2012       Allergies   Allergen Reactions   • Adhesive Tape Rash   • Latex Hives and Rash         Current Outpatient Medications:   •  amantadine (SYMMETREL) 100 MG tablet, , Disp: , Rfl:   •  cyanocobalamin (VITAMIN B-12N) 50 MCG tablet, , Disp: , Rfl:   •  hydrOXYzine (ATARAX) 10 MG tablet, Take 1 tablet by mouth Every 6 (Six) Hours As Needed for Anxiety., Disp: 60 tablet, Rfl: 1  •  ondansetron (ZOFRAN) 4 MG tablet, Take 1 tablet by mouth Every 6 (Six) Hours As Needed for Nausea., Disp: , Rfl:   •  tretinoin (RETIN-A) 0.025 % cream, APPLY A PEA SIZED AMOUNT TO THE ENITRE FACE EVERY THIRD NIGHT INCREASING TO NIGHTLY AS TOLERATED, Disp: , Rfl:   •  vitamin D (ERGOCALCIFEROL) 1.25 MG (65061 UT) capsule capsule, Take 1 capsule by mouth once a week, Disp: 13  capsule, Rfl: 0  •  zolpidem CR (AMBIEN CR) 6.25 MG CR tablet, Take 1 tablet by mouth., Disp: , Rfl:   •  acetaminophen (Tylenol 8 Hour) 650 MG 8 hr tablet, , Disp: , Rfl:   •  baclofen (LIORESAL) 10 MG tablet, Take 1 tablet by mouth 3 (Three) Times a Day., Disp: , Rfl:   •  estradiol-levonorgestrel (Climara Pro) 0.045-0.015 MG/DAY, Place 1 patch on the skin as directed by provider 1 (One) Time Per Week., Disp: 4 patch, Rfl: 11  •  lidocaine (XYLOCAINE) 5 % ointment, APPLY TO AFFECTED AREA(S) BY TOPICAL ROUTE 1-4 TIMES DAILY AS NEEDED, Disp: , Rfl:   •  Semaglutide-Weight Management 0.25 MG/0.5ML solution auto-injector, Inject 0.5 mL under the skin into the appropriate area as directed 1 (One) Time Per Week for 4 doses. Inject 0.25mg or or 25units under the skin into the appropriate area as directed 1 (One) time per week Semaglutide-Cyanocobalmin 1mg-0.25mg/1ml, Disp: 2 mL, Rfl: 0  •  Semaglutide-Weight Management 0.5 MG/0.5ML solution auto-injector, Inject 0.5 mL under the skin into the appropriate area as directed 1 (One) Time Per Week. Inject 0.5mg or 50units under the skin into the appropriate area as directed 1 (One) time per week Semaglutide-Cyanocobalmin 1mg-0.25mg/1ml, Disp: 2 mL, Rfl: 0  •  Semaglutide-Weight Management 1 MG/0.5ML solution auto-injector, Inject 0.5 mL under the skin into the appropriate area as directed 1 (One) Time Per Week for 4 doses. Inject 1mg or 100units under the skin into the appropriate area as directed 1 (One) time per week Semaglutide-Cyanocobalmin 1mg-0.25mg/1ml, Disp: 2 mL, Rfl: 0  •  SUMAtriptan (IMITREX) 100 MG tablet, Take 1 tablet by mouth As Needed for Migraine for up to 30 days. Take one tablet at onset of headache. May repeat dose one time in 2 hours if headache not relieved., Disp: 30 tablet, Rfl: 0  •  Valtrex 1 g tablet, Take 1 tablet by mouth Daily., Disp: 90 tablet, Rfl: 0    Social History     Socioeconomic History   • Marital status: Single   Tobacco Use   •  Smoking status: Never     Passive exposure: Never   • Smokeless tobacco: Never   Vaping Use   • Vaping status: Never Used   Substance and Sexual Activity   • Alcohol use: Never   • Drug use: Never   • Sexual activity: Yes     Partners: Male     Birth control/protection: Hysterectomy       Family History   Problem Relation Age of Onset   • Obesity Mother    • Hypertension Mother    • Heart disease Mother    • Sleep apnea Mother    • Diabetes Father    • Hypertension Father    • Stroke Father    • Heart attack Father    • Obesity Sister    • Sleep apnea Sister    • Breast cancer Maternal Grandmother          Review of Systems:  Review of Systems   Constitutional:  Positive for appetite change. Negative for fatigue and unexpected weight change.   HENT: Negative.     Eyes: Negative.    Respiratory: Negative.  Negative for shortness of breath.    Cardiovascular: Negative.  Negative for chest pain, palpitations and leg swelling.   Gastrointestinal:  Negative for abdominal distention, abdominal pain, constipation, diarrhea, nausea and vomiting.   Genitourinary:  Negative for difficulty urinating, frequency and urgency.   Musculoskeletal:  Positive for arthralgias. Negative for back pain.   Skin: Negative.    Neurological:  Negative for headaches.   Psychiatric/Behavioral:  Positive for sleep disturbance.    All other systems reviewed and are negative.      Physical Exam:  Vital Signs:  Weight: 75.8 kg (167 lb)   Body mass index is 26.84 kg/m².  Temp: 97.5 °F (36.4 °C)   Heart Rate: 63   BP: 128/73     Physical Exam  Vitals and nursing note reviewed.   Constitutional:       Appearance: She is well-developed.   Neck:      Thyroid: No thyromegaly.   Cardiovascular:      Rate and Rhythm: Normal rate.   Pulmonary:      Effort: Pulmonary effort is normal. No respiratory distress.   Abdominal:      Palpations: Abdomen is soft.   Musculoskeletal:         General: No tenderness.   Skin:     General: Skin is warm and dry.    Neurological:      Mental Status: She is alert.   Psychiatric:         Behavior: Behavior normal.          Assessment:         Leslie Art is a 44 y.o. year old female with Body mass index is 26.84 kg/m². . Weight: 75.8 kg (167 lb), Body mass index is 26.84 kg/m². and weight related problems including hypertension, dyslipidemia, depression, and mental health disease.    Recent testing was reviewed with the patient including CBC, CMP, TSH, Hba1c dated 10/18/24 were WNL .    Leslie Art was screened for sleep apnea in our office today and based on their results she is low risk for ONI. The risks, as they relate to chronic hypercapnia r/t untreated ONI were discussed with the patient and She verbalized understanding.     The patient was advised to start a high protein, low fat and low carbohydrate diet  start routine exercise including but not limited to 150 minutes per week. The patient was given individualized information by our dietician along with handouts.     I think she is a good candidate for regular follow up and treatment as a participant in our medical weight loss program.    Encounter Diagnoses   Name Primary?   • Overweight (BMI 25.0-29.9) Yes   • Li's esophagus without dysplasia    • Mixed stress and urge urinary incontinence    • Urinary urgency    • Depressive disorder    • Essential hypertension    • Encounter for medication refill    • Hyperlipidemia, unspecified hyperlipidemia type    • Fibromyalgia    • Rheumatoid arthritis, involving unspecified site, unspecified whether rheumatoid factor present        Plan:    Diagnoses and all orders for this visit:    1. Overweight (BMI 25.0-29.9) (Primary)    2. Li's esophagus without dysplasia    3. Mixed stress and urge urinary incontinence    4. Urinary urgency    5. Depressive disorder    6. Essential hypertension  Overview:  Lifestyle mods, weight management, exercise      7. Encounter for medication refill    8. Hyperlipidemia,  unspecified hyperlipidemia type  Overview:  ASCVD risk is 1.1%  Lifestyle mods, weight management, exercise      9. Fibromyalgia    10. Rheumatoid arthritis, involving unspecified site, unspecified whether rheumatoid factor present    Other orders  -     Semaglutide-Weight Management 0.25 MG/0.5ML solution auto-injector; Inject 0.5 mL under the skin into the appropriate area as directed 1 (One) Time Per Week for 4 doses. Inject 0.25mg or or 25units under the skin into the appropriate area as directed 1 (One) time per week Semaglutide-Cyanocobalmin 1mg-0.25mg/1ml  Dispense: 2 mL; Refill: 0  -     Semaglutide-Weight Management 0.5 MG/0.5ML solution auto-injector; Inject 0.5 mL under the skin into the appropriate area as directed 1 (One) Time Per Week. Inject 0.5mg or 50units under the skin into the appropriate area as directed 1 (One) time per week Semaglutide-Cyanocobalmin 1mg-0.25mg/1ml  Dispense: 2 mL; Refill: 0  -     Semaglutide-Weight Management 1 MG/0.5ML solution auto-injector; Inject 0.5 mL under the skin into the appropriate area as directed 1 (One) Time Per Week for 4 doses. Inject 1mg or 100units under the skin into the appropriate area as directed 1 (One) time per week Semaglutide-Cyanocobalmin 1mg-0.25mg/1ml  Dispense: 2 mL; Refill: 0         Patient did receive group education by our bariatric dieitian today and will undergo individual evaluation with assistance in developing an individualized dietary plan in two weeks time. The patient and I discussed her daily routine including diet recall, work schedule, home life with a focus on opportunities for lifestyle changes related to physical activity, meal planning, and healthy eating. Education was provided regarding logging dietary intake either by manually recording intake or using a dietary tracking gissell with a focus on macronutrient intake including protein, fat, and carbohydrates resulting in a reduced calorie diet. Discussed the importance of focusing  on nutrient dense foods, minimally processed foods high in protein and fiber content to support earlier and prolonged fullness and satiety as well as the importance of limiting and moderating processed and high glycemic index foods and how to couple small amounts of these with fiber, protein and healthy fat to stabilize blood sugar. Discussed the importance of sleep hygiene and exercise as they relate to weight maintenance, cardiovascular health as well as stress mediation and reducing inflammation. Generally discussed weight loss program options regarding different diet plans.     Patient was advised to focus on tracking dietary intake manually or via tracking gissell for the next two weeks until individual follow up with the dietitian. At which time, She will decide which dietary plan She would like to follow.     Goals for changes prior to next appointment include keeping a diet log which She will bring to female next appointment. Will focus on increasing fiber and green vegetables in her diet and getting a protein source in three times daily with a goal of at least 80g of protein daily.     Will restart semaglutide.     Total time spent during this encounter today was 45 minutes and includes preparing for the visit, reviewing tests, performing a medically appropriate examination and/or evaluations, counseling and educating the patient/family/caregiver, ordering medications, tests, or procedures, documenting information in the medical record, and independently interpreting results and communicating that information with the patient/family/caregiver  SYMONE Tilley  12/2/2024

## 2024-12-06 RX ORDER — ESTROGEN,CON/M-PROGEST ACET 0.3-1.5MG
1 TABLET ORAL DAILY
Qty: 60 TABLET | Refills: 6 | Status: SHIPPED | OUTPATIENT
Start: 2024-12-06

## 2024-12-12 ENCOUNTER — HOSPITAL ENCOUNTER (OUTPATIENT)
Dept: MRI IMAGING | Facility: HOSPITAL | Age: 44
Discharge: HOME | End: 2024-12-12
Payer: OTHER GOVERNMENT

## 2024-12-12 DIAGNOSIS — M79.671 PAIN IN BOTH FEET: ICD-10-CM

## 2024-12-12 DIAGNOSIS — M72.2 PLANTAR FASCIITIS, BILATERAL: ICD-10-CM

## 2024-12-12 DIAGNOSIS — M79.642 LEFT HAND PAIN: ICD-10-CM

## 2024-12-12 DIAGNOSIS — M79.672 PAIN IN BOTH FEET: ICD-10-CM

## 2024-12-12 DIAGNOSIS — M79.641 RIGHT HAND PAIN: ICD-10-CM

## 2024-12-12 PROCEDURE — 73220 MRI UPPR EXTREMITY W/O&W/DYE: CPT

## 2024-12-12 PROCEDURE — A9577 INJ MULTIHANCE: HCPCS | Performed by: INTERNAL MEDICINE

## 2024-12-12 PROCEDURE — 73718 MRI LOWER EXTREMITY W/O DYE: CPT

## 2024-12-12 PROCEDURE — 25510000002 GADOBENATE DIMEGLUMINE 529 MG/ML SOLUTION: Performed by: INTERNAL MEDICINE

## 2024-12-12 RX ADMIN — GADOBENATE DIMEGLUMINE 15 ML: 529 INJECTION, SOLUTION INTRAVENOUS at 12:42

## 2025-01-08 ENCOUNTER — TELEMEDICINE (OUTPATIENT)
Dept: BARIATRICS/WEIGHT MGMT | Facility: CLINIC | Age: 45
End: 2025-01-08
Payer: OTHER GOVERNMENT

## 2025-01-08 VITALS — BODY MASS INDEX: 24.81 KG/M2 | HEIGHT: 66 IN | WEIGHT: 154.4 LBS

## 2025-01-08 DIAGNOSIS — I10 ESSENTIAL HYPERTENSION: ICD-10-CM

## 2025-01-08 DIAGNOSIS — E66.3 OVERWEIGHT (BMI 25.0-29.9): Primary | ICD-10-CM

## 2025-01-08 DIAGNOSIS — E78.00 HYPERCHOLESTEROLEMIA: ICD-10-CM

## 2025-01-08 DIAGNOSIS — E78.5 HYPERLIPIDEMIA, UNSPECIFIED HYPERLIPIDEMIA TYPE: ICD-10-CM

## 2025-01-08 PROBLEM — R23.2 FLUSHING: Status: RESOLVED | Noted: 2023-10-02 | Resolved: 2025-01-08

## 2025-01-08 NOTE — PROGRESS NOTES
[facility] BARIATRIC [facility] BARIATRIC SURGERY  [address]  [facility] [id number] [phone]    Provider location:  [address] [id number] [phone]  Dept: [phone]  1/8/2025      Leslie Art.  [id number]  [id number]  1980  female      Chief Complaint   Patient presents with    Follow-up     MWL- wegovy     You have chosen to receive care through a telehealth visit.  Do you consent to use a video/audio connection for your medical care today? Yes     BH Post-Op Bariatric Medicine:   Leslie Art presents today for follow up today for provider supervised medical weight loss. She restarted compounded semaglutide at 0.25mg last month. Her PCP started her on this therapy in July of 2023 which was continued through August of this year. She lost more than 15lb but then patient was referred here as her PCP is no longer providing MWL.    She reports better toleration of the medication this time compared to when she was taking it before.     She reports that she does feel that there is a difference in energy levels on the medication but has recently started premarin and doing monthly b12 injections    HPI:   Today's weight is 70 kg (154 lb 6.4 oz) pounds, today's BMI is Body mass index is 24.81 kg/m²., she has a  loss of 11 pounds since the last visit.     Leslie Art denies nausea, vomiting, reflux, abdominal pain and reports that she has tolerated the 0.25mg of semaglutide well without heartburn, reflux, constipation.     She has started prempro and has been taking b12 injections. She denies a change in appetite thus far but notes less head hunger     Diet and Exercise: Diet history reviewed and discussed with the patient. Weight loss/gains to date discussed with the patient. The patient states they are eating 60 grams of protein per day. She reports eating 3 meals per day, a typical portion size of 2/3 cup, eating 1 snacks  per day, drinking 5-6 or more 8-oz. glasses of water per day, no carbonated beverage consumption and exercising regularly.     Supplements: b12 injections.     Review of Systems   Constitutional:  Positive for appetite change. Negative for fatigue and unexpected weight change.   HENT: Negative.     Eyes: Negative.    Respiratory: Negative.     Cardiovascular: Negative.  Negative for leg swelling.   Gastrointestinal:  Negative for abdominal distention, abdominal pain, constipation, diarrhea, nausea and vomiting.   Genitourinary:  Negative for difficulty urinating, frequency and urgency.   Musculoskeletal:  Negative for back pain.   Skin: Negative.    Psychiatric/Behavioral: Negative.     All other systems reviewed and are negative.      Patient Active Problem List   Diagnosis    Allergic rhinitis    Arthralgia of multiple joints    Arthralgia of right ankle    Arthralgia of right elbow    Cervical dysplasia    Congenital cavus deformity of foot    Congenital pes cavus    Depressive disorder    Disorder of pharynx    Dysphagia    Dysmenorrhea, unspecified    Elevated blood-pressure reading without diagnosis of hypertension    Essential hypertension    Fatigue    Herpes simplex    Hypercholesterolemia    Hyperlipidemia    Hypothyroidism    Increased frequency of urination    Inflammation of peripheral nerve of right foot    Insomnia    Lateral epicondylitis of right elbow    Migraine headache    Mixed stress and urge urinary incontinence    Plantar fascial fibromatosis    Plantar fasciitis of left foot    Plantar fasciitis of right foot    Rheumatoid arthritis    Rhytidosis facialis    Rosacea    Urinary urgency    Degeneration of lumbar intervertebral disc    Inflammation of sacroiliac joint    Lumbar radiculopathy    Vitamin D deficiency    Li's esophagus    Urinary, incontinence, stress female    Fibromyalgia    Overweight (BMI 25.0-29.9)    Sciatica       Past Medical History:   Diagnosis Date    Anxiety      Attempted suicide     Depression     Hypertension     Migraines        The following portions of the patient's history were reviewed and updated as appropriate: allergies, current medications, past family history, past medical history, past social history, past surgical history, and problem list.    There were no vitals filed for this visit.    Physical Exam  Vitals and nursing note reviewed.   Constitutional:       Appearance: She is well-developed.   Neck:      Thyroid: No thyromegaly.   Cardiovascular:      Rate and Rhythm: Normal rate.   Pulmonary:      Effort: Pulmonary effort is normal. No respiratory distress.   Abdominal:      Palpations: Abdomen is soft.   Musculoskeletal:         General: No tenderness.   Skin:     General: Skin is warm and dry.   Neurological:      Mental Status: She is alert.   Psychiatric:         Behavior: Behavior normal.         Assessment:   The patient is doing well. .   (E66.3) Overweight (BMI 25.0-29.9)    (I10) Essential hypertension    (E78.5) Hyperlipidemia, unspecified hyperlipidemia type    (E78.00) Hypercholesterolemia     Plan:   Continue prioritizing lean protein and fiber at mealtimes and cooking most of your meals at home to take to work.     Encouraged patient to be sure to get plenty of lean protein per day through small frequent meals all with a protein source.   Activity restrictions: none.   Recommended patient be sure to get at least 70 grams of protein per day by eating small, frequent meals all with high lean protein choices. Be sure to limit/cut back on daily carbohydrate intake. Discussed with the patient the recommended amount of water per day to intake- half of body weight in ounces. Reviewed vitamin requirements. Be sure to do routine exercise, 150 minutes per week minimum, including both cardio and strength training.     Instructions / Recommendations: dietary counseling recommended, recommended a daily protein intake of  grams, vitamin supplement(s)  recommended, recommended exercising at least 150 minutes per week, behavior modifications recommended and instructed to call the office for concerns, questions, or problems.     The patient was instructed to follow up in 2 months .     The patient was counseled regarding. Total time spent during this encounter today was 30 minutes

## 2025-01-30 ENCOUNTER — TELEPHONE (OUTPATIENT)
Dept: INTERNAL MEDICINE | Facility: CLINIC | Age: 45
End: 2025-01-30
Payer: OTHER GOVERNMENT

## 2025-01-30 ENCOUNTER — TELEPHONE (OUTPATIENT)
Dept: INTERNAL MEDICINE | Facility: CLINIC | Age: 45
End: 2025-01-30

## 2025-01-30 NOTE — TELEPHONE ENCOUNTER
Hub staff attempted to follow warm transfer process and was unsuccessful     Caller: Leslie Art    Relationship to patient: Self    Best call back number:     Patient is needing: PATIENT IS RETURNING A CALL TO [facility] SHE [facility]S THE M[facility]

## 2025-01-30 NOTE — TELEPHONE ENCOUNTER
Hub to Relay     MA called pt and lvm to call office regarding MyChart message, MA sent pt a MyChart message on next steps regarding treatment.

## 2025-02-02 DIAGNOSIS — Z76.0 ENCOUNTER FOR MEDICATION REFILL: ICD-10-CM

## 2025-02-05 RX ORDER — CONJUGATED ESTROGENS/BAZEDOXIFENE .45; 2 MG/1; MG/1
1 TABLET, FILM COATED ORAL DAILY
Qty: 30 TABLET | Refills: 1 | Status: SHIPPED | OUTPATIENT
Start: 2025-02-05 | End: 2025-02-06

## 2025-02-05 RX ORDER — ESTRADIOL AND LEVONORGESTREL .045; .015 MG/D; MG/D
1 PATCH TRANSDERMAL WEEKLY
Qty: 4 PATCH | Refills: 11 | Status: SHIPPED | OUTPATIENT
Start: 2025-02-05 | End: 2025-02-05

## 2025-02-05 RX ORDER — SUMATRIPTAN SUCCINATE 100 MG/1
TABLET ORAL
Qty: 30 TABLET | Refills: 0 | Status: SHIPPED | OUTPATIENT
Start: 2025-02-05

## 2025-02-06 RX ORDER — ESTRADIOL 1 MG/1
1 TABLET ORAL DAILY
Qty: 60 TABLET | Refills: 1 | Status: SHIPPED | OUTPATIENT
Start: 2025-02-06

## 2025-02-10 NOTE — TELEPHONE ENCOUNTER
Rx Refill Note  Requested Prescriptions     Pending Prescriptions Disp Refills    vitamin D (ERGOCALCIFEROL) 1.25 MG (73142 UT) capsule capsule 13 capsule 0     Sig: Take 1 capsule by mouth 1 (One) Time Per Week.      Last office visit with prescribing clinician: 10/18/2024   Last telemedicine visit with prescribing clinician: Visit date not found   Next office visit with prescribing clinician: Visit date not found

## 2025-02-12 DIAGNOSIS — Z01.419 WOMEN'S ANNUAL ROUTINE GYNECOLOGICAL EXAMINATION: Primary | ICD-10-CM

## 2025-02-12 RX ORDER — ERGOCALCIFEROL 1.25 MG/1
50000 CAPSULE, LIQUID FILLED ORAL WEEKLY
Qty: 13 CAPSULE | Refills: 0 | Status: SHIPPED | OUTPATIENT
Start: 2025-02-12

## 2025-02-12 RX ORDER — ESTRADIOL 0.1 MG/G
2 CREAM VAGINAL DAILY
Qty: 1 EACH | Refills: 0 | Status: SHIPPED | OUTPATIENT
Start: 2025-02-12

## 2025-02-24 DIAGNOSIS — G47.00 INSOMNIA, UNSPECIFIED TYPE: Primary | ICD-10-CM

## 2025-02-24 RX ORDER — ZOLPIDEM TARTRATE 6.25 MG/1
6.25 TABLET, FILM COATED, EXTENDED RELEASE ORAL NIGHTLY PRN
Qty: 30 TABLET | Refills: 1 | Status: SHIPPED | OUTPATIENT
Start: 2025-02-24

## 2025-03-06 ENCOUNTER — APPOINTMENT (OUTPATIENT)
Dept: BONE DENSITY | Facility: HOSPITAL | Age: 45
End: 2025-03-06
Payer: OTHER GOVERNMENT

## 2025-03-06 DIAGNOSIS — Z01.419 WOMEN'S ANNUAL ROUTINE GYNECOLOGICAL EXAMINATION: ICD-10-CM

## 2025-03-06 PROCEDURE — 77080 DXA BONE DENSITY AXIAL: CPT

## 2025-03-13 DIAGNOSIS — M25.50 ARTHRALGIA OF MULTIPLE JOINTS: ICD-10-CM

## 2025-03-13 RX ORDER — TRAMADOL HYDROCHLORIDE 50 MG/1
50 TABLET ORAL EVERY 8 HOURS PRN
Qty: 90 TABLET | Refills: 0 | OUTPATIENT
Start: 2025-03-13

## 2025-03-13 NOTE — TELEPHONE ENCOUNTER
DeDee pt  I don't see a contract but it might not be scanned in yet?    Last OV 10/18/24  No f/u scheduled

## 2025-03-18 RX ORDER — ESTRADIOL 0.1 MG/G
CREAM VAGINAL
Qty: 43 G | Refills: 0 | Status: SHIPPED | OUTPATIENT
Start: 2025-03-18

## 2025-03-24 ENCOUNTER — TELEMEDICINE (OUTPATIENT)
Dept: INTERNAL MEDICINE | Facility: CLINIC | Age: 45
End: 2025-03-24
Payer: OTHER GOVERNMENT

## 2025-03-24 DIAGNOSIS — N39.0 URINARY TRACT INFECTION WITHOUT HEMATURIA, SITE UNSPECIFIED: ICD-10-CM

## 2025-03-24 DIAGNOSIS — R30.0 DYSURIA: Primary | ICD-10-CM

## 2025-03-24 PROCEDURE — 99213 OFFICE O/P EST LOW 20 MIN: CPT

## 2025-03-24 RX ORDER — FLUCONAZOLE 150 MG/1
150 TABLET ORAL ONCE
Qty: 2 TABLET | Refills: 0 | Status: SHIPPED | OUTPATIENT
Start: 2025-03-24 | End: 2025-03-24

## 2025-03-24 RX ORDER — SULFAMETHOXAZOLE AND TRIMETHOPRIM 800; 160 MG/1; MG/1
1 TABLET ORAL 2 TIMES DAILY
Qty: 14 TABLET | Refills: 0 | Status: SHIPPED | OUTPATIENT
Start: 2025-03-24 | End: 2025-03-31

## 2025-03-24 NOTE — PATIENT INSTRUCTIONS
Take Bactrim twice daily as prescribed.  Take Diflucan once at the start of taking Bactrim.  May take second dose in 24 hours of needed.  Provide urine sample for urine culture and urinalysis.  Different antibiotic to be sent if necessary after urine culture results are completed.  Stay hydrated with water.  Follow-up as needed.

## 2025-03-24 NOTE — PROGRESS NOTES
"Chief Complaint  Urinary Tract Infection    Subjective        Leslie Art presents to [facility] PR[facility]  History of Present Illness  45-year-old female presenting via video visit for urinary tract infection symptoms.  Patient of SYMONE Rivera.  Symptoms started Thursday including some odorous urine.  Over the weekend started having low-grade fever and back pain.  This morning had significant bladder pain.  Denies any fever, hematuria or cloudy urine.      If antibiotic is prescribed, patient will need      Objective   Vital Signs:  There were no vitals taken for this visit.  Estimated body mass index is 24.81 kg/m² as calculated from the following:    Height as of 1/8/25: 168 cm (66.14\").    Weight as of 1/8/25: 70 kg (154 lb 6.4 oz).       BMI is within normal parameters. No other follow-up for BMI required.      Physical Exam  Vitals reviewed: Unable to perform physical exam due to video visit..   Neurological:      Mental Status: She is alert.        Result Review :      Common labs          4/1/2024    12:11 4/17/2024    08:46 10/18/2024    08:35   Common Labs   Glucose   156    BUN   13    Creatinine   0.69    Sodium   136    Potassium   4.4    Chloride   104    Calcium   10.0    Albumin   4.4    Total Bilirubin   0.3    Alkaline Phosphatase   58    AST (SGOT)   18    ALT (SGPT)   22    WBC 5.14     10.86     8.76    Hemoglobin 12.8     12.8     14.3    Hematocrit 39.0     39.3     39.9    Platelets 349     316     295    Total Cholesterol   303    Triglycerides   202    HDL Cholesterol   68    LDL Cholesterol    197    Hemoglobin A1C   5.10       Details          This result is from an external source.                 Current Outpatient Medications on File Prior to Visit   Medication Sig Dispense Refill    azithromycin (Zithromax Z-Darrell) 250 MG tablet Take 2 tablets by mouth on day 1, then 1 tablet daily on days 2-5 6 tablet 0    baclofen (LIORESAL) 10 MG tablet Take 1 " tablet by mouth 3 (Three) Times a Day.      cyanocobalamin (VITAMIN B-12N) 50 MCG tablet       estradiol (ESTRACE) 0.1 MG/GM vaginal cream INSERT 2 GRAMS INTO THE VAGINA DAILY FOR 2 WEEKS; THEN TWICE WEEKLY 43 g 0    estradiol (Estrace) 1 MG tablet Take 1 tablet by mouth Daily. 60 tablet 1    hydrOXYzine (ATARAX) 10 MG tablet Take 1 tablet by mouth Every 6 (Six) Hours As Needed for Anxiety. 60 tablet 1    lidocaine (XYLOCAINE) 5 % ointment APPLY TO AFFECTED AREA(S) BY TOPICAL ROUTE 1-4 TIMES DAILY AS NEEDED      ondansetron (ZOFRAN) 4 MG tablet Take 1 tablet by mouth Every 6 (Six) Hours As Needed for Nausea.      SUMAtriptan (IMITREX) 100 MG tablet TAKE 1 TABLET BY MOUTH AT ONSET OF HEADACHE AS NEEDED FOR MIGRAINE. MAY REPEAT DOSE 1 TIME IN 2 HOURS IF HEADACHE NOT RELIEVED 30 tablet 0    tretinoin (RETIN-A) 0.025 % cream APPLY A PEA SIZED AMOUNT TO THE ENITRE FACE EVERY THIRD NIGHT INCREASING TO NIGHTLY AS TOLERATED      Valtrex 1 g tablet Take 1 tablet by mouth Daily. 90 tablet 0    vitamin D (ERGOCALCIFEROL) 1.25 MG (70913 UT) capsule capsule Take 1 capsule by mouth 1 (One) Time Per Week. 13 capsule 0    zolpidem CR (Ambien CR) 6.25 MG CR tablet Take 1 tablet by mouth At Night As Needed for Sleep. 30 tablet 1     No current facility-administered medications on file prior to visit.                Assessment and Plan     Diagnoses and all orders for this visit:    1. Dysuria (Primary)  -     Urine Culture - Urine, Urine, Clean Catch  -     Urinalysis With Microscopic If Indicated (No Culture) - Urine, Clean Catch    2. Urinary tract infection without hematuria, site unspecified  -     sulfamethoxazole-trimethoprim (Bactrim DS) 800-160 MG per tablet; Take 1 tablet by mouth 2 (Two) Times a Day for 7 days.  Dispense: 14 tablet; Refill: 0  -     fluconazole (Diflucan) 150 MG tablet; Take 1 tablet by mouth 1 (One) Time for 1 dose. May take second dose if needed after 24 hours.  Dispense: 2 tablet; Refill:  0        Patient Instructions   Take Bactrim twice daily as prescribed.  Take Diflucan once at the start of taking Bactrim.  May take second dose in 24 hours of needed.  Provide urine sample for urine culture and urinalysis.  Different antibiotic to be sent if necessary after urine culture results are completed.  Stay hydrated with water.  Follow-up as needed.    You have chosen to receive care through a telehealth visit.  Do you consent to use a video/audio connection for your medical care today? 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         Follow Up     Return if symptoms worsen or fail to improve.  Patient was given instructions and counseling regarding her condition or for health maintenance advice. Please see specific information pulled into the AVS if appropriate.

## 2025-03-25 DIAGNOSIS — E28.39 MENOPAUSE OVARIAN FAILURE: Primary | ICD-10-CM

## 2025-03-27 ENCOUNTER — TELEPHONE (OUTPATIENT)
Dept: OBSTETRICS AND GYNECOLOGY | Facility: CLINIC | Age: 45
End: 2025-03-27
Payer: OTHER GOVERNMENT

## 2025-03-27 NOTE — TELEPHONE ENCOUNTER
From: Nokhbehzaeim, Mahrokh, MD  Sent: 3/27/2025   8:31 AM EDT  To: Glenroy Beck        I do not manage  menopause, they need to see gynecologist  ----- Message -----  From: Roxana Macedo RegSched Rep  Sent: 3/27/2025   8:00 AM EDT  To: Mahrokh Nokhbehzaeim, MD     Received referral for   E28.39 (ICD-10-CM) - Menopause ovarian failureCan we see the patient?

## 2025-04-11 ENCOUNTER — CLINICAL SUPPORT (OUTPATIENT)
Dept: OBSTETRICS AND GYNECOLOGY | Facility: CLINIC | Age: 45
End: 2025-04-11
Payer: OTHER GOVERNMENT

## 2025-04-11 DIAGNOSIS — F41.9 ANXIETY: Primary | ICD-10-CM

## 2025-04-11 RX ORDER — ALPRAZOLAM 0.5 MG
0.5 TABLET ORAL 2 TIMES DAILY PRN
Qty: 10 TABLET | Refills: 1 | Status: SHIPPED | OUTPATIENT
Start: 2025-04-11

## 2025-04-11 NOTE — PROGRESS NOTES
Today is a Tcon. She has an Endocrinology appt pending. The oral estrogen did not help her and caused migraines ( No Hx of migraines with Aura). PFPT is going well. Has some social aspects of life causing severe anxiety. Has CBT scheduled. Desires to discuss Tx options     Last Appt: 45yo  with Hx of robotic TLH, BS in  with concern for menopause. Since her delivery she has had vasomotor symptoms. Has been worked up fro menopause and labs have been negative. Interestingly enough; no hot flashes the last 1-2 months. Denies vaginal dryness or irritability. Has always had insomnia and used multiple agents to assist with sometimes no relief.      PMH:   Past Medical History:   Diagnosis Date    Anxiety     Attempted suicide     Depression     Hypertension     Migraines                 PSH:     Past Surgical History:   Procedure Laterality Date    AUGMENTATION MAMMAPLASTY Bilateral     2006    ELBOW PROCEDURE Right     elbow repair    FOOT SURGERY Right 2022    HEMORRHOIDECTOMY  2006    HYSTERECTOMY      KNEE SURGERY Left     LAPAROSCOPIC CHOLECYSTECTOMY              POB hx:   PGYN hx:  STD Denies   Pap ( Denies Abnormal, LEEP, [facility])  Cuff screen next appt ( )   Declines Gardasil   Contraception TLH    Menarche 14 yo   Menopause      Social hx:   Social History     Socioeconomic History    Marital status: Single   Tobacco Use    Smoking status: Never     Passive exposure: Never    Smokeless tobacco: Never   Vaping Use    Vaping status: Never Used   Substance and Sexual Activity    Alcohol use: Never    Drug use: Never    Sexual activity: Yes     Partners: Male     Birth control/protection: Hysterectomy        [  X ] no h/o abuse/DV:                   Allergies:       Allergies   Allergen Reactions    Benztropine Unknown - Low Severity and Other (See Comments)    Adhesive Tape Rash    Latex Hives and Rash                  Meds:   Current Outpatient Medications:     ALPRAZolam (Xanax) 0.5 MG  tablet, Take 1 tablet by mouth 2 (Two) Times a Day As Needed for Anxiety., Disp: 10 tablet, Rfl: 1    azithromycin (Zithromax Z-Darrell) 250 MG tablet, Take 2 tablets by mouth on day 1, then 1 tablet daily on days 2-5, Disp: 6 tablet, Rfl: 0    baclofen (LIORESAL) 10 MG tablet, Take 1 tablet by mouth 3 (Three) Times a Day., Disp: , Rfl:     cyanocobalamin (VITAMIN B-12N) 50 MCG tablet, , Disp: , Rfl:     estradiol (ESTRACE) 0.1 MG/GM vaginal cream, INSERT 2 GRAMS INTO THE VAGINA DAILY FOR 2 WEEKS; THEN TWICE WEEKLY, Disp: 43 g, Rfl: 0    estradiol (Estrace) 1 MG tablet, Take 1 tablet by mouth Daily., Disp: 60 tablet, Rfl: 1    hydrOXYzine (ATARAX) 10 MG tablet, Take 1 tablet by mouth Every 6 (Six) Hours As Needed for Anxiety., Disp: 60 tablet, Rfl: 1    lidocaine (XYLOCAINE) 5 % ointment, APPLY TO AFFECTED AREA(S) BY TOPICAL ROUTE 1-4 TIMES DAILY AS NEEDED, Disp: , Rfl:     ondansetron (ZOFRAN) 4 MG tablet, Take 1 tablet by mouth Every 6 (Six) Hours As Needed for Nausea., Disp: , Rfl:     Semaglutide-Weight Management 1 MG/0.5ML solution auto-injector, Inject 0.5 mL under the skin into the appropriate area as directed 1 (One) Time Per Week for 4 doses. Inject 1mg or 100units under the skin into the appropriate area as directed 1 (One) time per week Semaglutide 1mg/1ml, Disp: 2 mL, Rfl: 0    SUMAtriptan (IMITREX) 100 MG tablet, TAKE 1 TABLET BY MOUTH AT ONSET OF HEADACHE AS NEEDED FOR MIGRAINE. MAY REPEAT DOSE 1 TIME IN 2 HOURS IF HEADACHE NOT RELIEVED, Disp: 30 tablet, Rfl: 0    tretinoin (RETIN-A) 0.025 % cream, APPLY A PEA SIZED AMOUNT TO THE ENITRE FACE EVERY THIRD NIGHT INCREASING TO NIGHTLY AS TOLERATED, Disp: , Rfl:     Valtrex 1 g tablet, Take 1 tablet by mouth Daily., Disp: 90 tablet, Rfl: 0    vitamin D (ERGOCALCIFEROL) 1.25 MG (80184 UT) capsule capsule, Take 1 capsule by mouth 1 (One) Time Per Week., Disp: 13 capsule, Rfl: 0    zolpidem CR (Ambien CR) 6.25 MG CR tablet, Take 1 tablet by mouth At Night As  Needed for Sleep., Disp: 30 tablet, Rfl: 1     Review of Systems   See HPI     No Vitals Tcon      Alert and orientated x3       Diagnoses and all orders for this visit:    1. Anxiety (Primary)  -     ALPRAZolam (Xanax) 0.5 MG tablet; Take 1 tablet by mouth 2 (Two) Times a Day As Needed for Anxiety.  Dispense: 10 tablet; Refill: 1     We will consider  oral conjugated estrogen combined with micronized progesterone ( I am aware she has no uterus) ; Attempted and wants to discuss with her Endo in the coming weeks   Atarax to assist with Insomnia   Benzo for extreme panic ; discussed R&B and addiction possibility     Cuff screen next appt  PFPT is going well   F/u with me as scheduled     You have chosen to receive care through a telephone visit. Do you consent to use a telephone visit for your medical care today? Yes     Wilbert Chi DO  [id number]     10:47 EDT

## 2025-04-14 DIAGNOSIS — F32.A DEPRESSIVE DISORDER: Primary | ICD-10-CM

## 2025-04-14 DIAGNOSIS — F41.9 ANXIETY: ICD-10-CM

## 2025-04-18 ENCOUNTER — TELEMEDICINE (OUTPATIENT)
Dept: INTERNAL MEDICINE | Facility: CLINIC | Age: 45
End: 2025-04-18
Payer: OTHER GOVERNMENT

## 2025-04-18 DIAGNOSIS — R30.0 DYSURIA: Primary | ICD-10-CM

## 2025-04-18 PROCEDURE — 99213 OFFICE O/P EST LOW 20 MIN: CPT | Performed by: STUDENT IN AN ORGANIZED HEALTH CARE EDUCATION/TRAINING PROGRAM

## 2025-04-18 RX ORDER — NITROFURANTOIN 25; 75 MG/1; MG/1
100 CAPSULE ORAL 2 TIMES DAILY
Qty: 10 CAPSULE | Refills: 0 | Status: SHIPPED | OUTPATIENT
Start: 2025-04-18

## 2025-04-18 NOTE — PROGRESS NOTES
"Mode of Visit: Video  Location of patient: -OTHER-: outside  [facility] provider[facility]  You have chosen to receive care through a telehealth visit.  The patient has signed the video visit consent form.  The visit included audio and video interaction. No technical issues occurred during this visit.     Chief Complaint  Urinary Tract Infection    Subjective        PCP: Ania Montes APRN    Leslie Art presents as a video visit with a cc of dysuria. Notes symptoms of pain with urination, discoloration of urine, malodorous urine, and bladder pain that can be severe at times. These symptoms first began on 3/23/25. She saw SYMONE Hui for a telehealth acute visit the following day who prescribed empiric treatment wit both diflucan and bactrim. She states that she thinks that the antibiotic might have helped transiently with her sever bladder pain symptom, but overall her symptoms never resolved completely. She was re-evaluated at an  yesterday and urine dipstick was negative for infection at that time. She denies vaginal discharge but does note some change in her vaginal odor. She is sexually active with her .   History of Present Illness      Objective   Vital Signs:  There were no vitals taken for this visit.  Estimated body mass index is 26.52 kg/m² as calculated from the following:    Height as of 4/17/25: 157.5 cm (62\").    Weight as of 4/17/25: 65.8 kg (145 lb).    Physical exam limited by the nature of telehealth visit.     Physical Exam  Constitutional:       General: She is not in acute distress.  HENT:      Head: Normocephalic and atraumatic.   Pulmonary:      Effort: No respiratory distress.   Musculoskeletal:      Cervical back: Normal range of motion.   Skin:     General: Skin is warm and dry.   Neurological:      Mental Status: She is alert and oriented to person, place, and time.   Psychiatric:         Mood and Affect: Mood normal.         Behavior: Behavior normal.      "   Result Review :  The following data was reviewed by: Anushka Hassan MD on 04/18/2025:  UA          4/17/2025    12:06   Urinalysis   Ketones, UA Negative    Leukocytes, UA Negative               Current Outpatient Medications:     ALPRAZolam (Xanax) 0.5 MG tablet, Take 1 tablet by mouth 2 (Two) Times a Day As Needed for Anxiety., Disp: 10 tablet, Rfl: 1    amantadine (SYMMETREL) 100 MG tablet, , Disp: , Rfl:     baclofen (LIORESAL) 10 MG tablet, Take 1 tablet by mouth As Needed., Disp: , Rfl:     cyanocobalamin (VITAMIN B-12N) 50 MCG tablet, , Disp: , Rfl:     estradiol (ESTRACE) 0.1 MG/GM vaginal cream, INSERT 2 GRAMS INTO THE VAGINA DAILY FOR 2 WEEKS; THEN TWICE WEEKLY, Disp: 43 g, Rfl: 0    estradiol (Estrace) 1 MG tablet, Take 1 tablet by mouth Daily., Disp: 60 tablet, Rfl: 1    hydrOXYzine (ATARAX) 10 MG tablet, Take 1 tablet by mouth Every 6 (Six) Hours As Needed for Anxiety., Disp: 60 tablet, Rfl: 1    ketorolac (TORADOL) 30 MG/ML injection, Inject 1 mL into the appropriate muscle as directed by prescriber., Disp: , Rfl:     lidocaine (XYLOCAINE) 5 % ointment, APPLY TO AFFECTED AREA(S) BY TOPICAL ROUTE 1-4 TIMES DAILY AS NEEDED, Disp: , Rfl:     ondansetron (ZOFRAN) 4 MG tablet, Take 1 tablet by mouth Every 6 (Six) Hours As Needed for Nausea., Disp: , Rfl:     promethazine (PHENERGAN) 25 MG tablet, Take 1 tablet by mouth Every 6 (Six) Hours As Needed., Disp: , Rfl:     Qulipta 60 MG tablet, Take 1 tablet by mouth Daily., Disp: , Rfl:     Semaglutide-Weight Management 1 MG/0.5ML solution auto-injector, Inject 0.5 mL under the skin into the appropriate area as directed 1 (One) Time Per Week for 4 doses. Inject 1mg or 100units under the skin into the appropriate area as directed 1 (One) time per week Semaglutide 1mg/1ml, Disp: 2 mL, Rfl: 0    SUMAtriptan (IMITREX) 100 MG tablet, TAKE 1 TABLET BY MOUTH AT ONSET OF HEADACHE AS NEEDED FOR MIGRAINE. MAY REPEAT DOSE 1 TIME IN 2 HOURS IF HEADACHE NOT RELIEVED,  Disp: 30 tablet, Rfl: 0    tretinoin (RETIN-A) 0.025 % cream, APPLY A PEA SIZED AMOUNT TO THE ENITRE FACE EVERY THIRD NIGHT INCREASING TO NIGHTLY AS TOLERATED, Disp: , Rfl:     ubrogepant (UBRELVY) 100 MG tablet, Take 1 tablet by mouth., Disp: , Rfl:     Valtrex 1 g tablet, Take 1 tablet by mouth Daily., Disp: 90 tablet, Rfl: 0    vitamin D (ERGOCALCIFEROL) 1.25 MG (16856 UT) capsule capsule, Take 1 capsule by mouth 1 (One) Time Per Week., Disp: 13 capsule, Rfl: 0    vitamin D (ERGOCALCIFEROL) 1.25 MG (59429 UT) capsule capsule, Take 1 capsule by mouth Every 7 (Seven) Days., Disp: , Rfl:     zolpidem CR (Ambien CR) 6.25 MG CR tablet, Take 1 tablet by mouth At Night As Needed for Sleep., Disp: 30 tablet, Rfl: 1    azithromycin (Zithromax Z-Darrell) 250 MG tablet, Take 2 tablets by mouth on day 1, then 1 tablet daily on days 2-5 (Patient not taking: Reported on 4/18/2025), Disp: 6 tablet, Rfl: 0    estradiol (ESTRACE) 0.1 MG/GM vaginal cream, Insert 2 g into the vagina 2 (Two) Times a Week. (Patient not taking: Reported on 4/18/2025), Disp: , Rfl:     nitrofurantoin, macrocrystal-monohydrate, (Macrobid) 100 MG capsule, Take 1 capsule by mouth 2 (Two) Times a Day., Disp: 10 capsule, Rfl: 0    SEMAGLUTIDE, 1 MG/DOSE, SC, INJECT 1ML (100 UNITS ON INSULIN SYRINGE) INTO SKIN ONCE WEEKLY (Patient not taking: Reported on 4/18/2025), Disp: , Rfl:       Assessment and Plan   Diagnoses and all orders for this visit:    1. Dysuria (Primary)  -     Urinalysis With Microscopic - Urine, Clean Catch; Future  -     Urine Culture - Urine, Urine, Clean Catch; Future  -     Chlamydia trachomatis, Neisseria gonorrhoeae, PCR - , Urine, Clean Catch; Future  -     nitrofurantoin, macrocrystal-monohydrate, (Macrobid) 100 MG capsule; Take 1 capsule by mouth 2 (Two) Times a Day.  Dispense: 10 capsule; Refill: 0       Will trial another round of different antibiotic. If not successful at addressing symptoms, can consider referral to  urology/urogynecology. Repeat urine studies pending. If urine STI screening positive, will treat accordingly.         Follow Up   No follow-ups on file.    Patient was given instructions and counseling regarding her condition or for health maintenance advice. Please see specific information pulled into the AVS if appropriate.     Anushka Hassan MD

## 2025-04-22 ENCOUNTER — LAB (OUTPATIENT)
Dept: LAB | Facility: HOSPITAL | Age: 45
End: 2025-04-22
Payer: OTHER GOVERNMENT

## 2025-04-22 DIAGNOSIS — R30.0 DYSURIA: Primary | ICD-10-CM

## 2025-04-22 LAB
BACTERIA UR QL AUTO: NORMAL /HPF
BILIRUB UR QL STRIP: NEGATIVE
CLARITY UR: CLEAR
COLOR UR: YELLOW
GLUCOSE UR STRIP-MCNC: NEGATIVE MG/DL
HGB UR QL STRIP.AUTO: NEGATIVE
HYALINE CASTS UR QL AUTO: NORMAL /LPF
KETONES UR QL STRIP: NEGATIVE
LEUKOCYTE ESTERASE UR QL STRIP.AUTO: NEGATIVE
MUCOUS THREADS URNS QL MICRO: NORMAL /HPF
NITRITE UR QL STRIP: NEGATIVE
PH UR STRIP.AUTO: 5.5 [PH] (ref 4.5–8)
PROT UR QL STRIP: NEGATIVE
RBC # UR STRIP: NORMAL /HPF
REF LAB TEST METHOD: NORMAL
SP GR UR STRIP: 1.02 (ref 1–1.03)
SQUAMOUS #/AREA URNS HPF: NORMAL /HPF
UROBILINOGEN UR QL STRIP: NORMAL
WBC # UR STRIP: NORMAL /HPF

## 2025-04-22 PROCEDURE — 81001 URINALYSIS AUTO W/SCOPE: CPT

## 2025-04-22 PROCEDURE — 87591 N.GONORRHOEAE DNA AMP PROB: CPT

## 2025-04-22 PROCEDURE — 87491 CHLMYD TRACH DNA AMP PROBE: CPT

## 2025-04-22 PROCEDURE — 87086 URINE CULTURE/COLONY COUNT: CPT

## 2025-04-23 LAB — BACTERIA SPEC AEROBE CULT: NO GROWTH

## 2025-04-24 LAB
C TRACH RRNA SPEC QL NAA+PROBE: NEGATIVE
N GONORRHOEA RRNA SPEC QL NAA+PROBE: NEGATIVE

## 2025-04-25 ENCOUNTER — OFFICE VISIT (OUTPATIENT)
Dept: SLEEP MEDICINE | Facility: HOSPITAL | Age: 45
End: 2025-04-25
Payer: OTHER GOVERNMENT

## 2025-04-25 VITALS
HEART RATE: 57 BPM | WEIGHT: 140 LBS | SYSTOLIC BLOOD PRESSURE: 131 MMHG | OXYGEN SATURATION: 100 % | BODY MASS INDEX: 24.8 KG/M2 | DIASTOLIC BLOOD PRESSURE: 74 MMHG | HEIGHT: 63 IN

## 2025-04-25 DIAGNOSIS — G47.00 INSOMNIA, UNSPECIFIED TYPE: Primary | ICD-10-CM

## 2025-04-25 DIAGNOSIS — I10 ESSENTIAL HYPERTENSION: ICD-10-CM

## 2025-04-25 DIAGNOSIS — F43.20 ADJUSTMENT DISORDER, UNSPECIFIED TYPE: ICD-10-CM

## 2025-04-25 DIAGNOSIS — E66.3 OVERWEIGHT (BMI 25.0-29.9): ICD-10-CM

## 2025-04-25 DIAGNOSIS — R03.0 ELEVATED BLOOD-PRESSURE READING WITHOUT DIAGNOSIS OF HYPERTENSION: ICD-10-CM

## 2025-04-25 DIAGNOSIS — G47.33 OSA (OBSTRUCTIVE SLEEP APNEA): ICD-10-CM

## 2025-04-25 DIAGNOSIS — G47.21 CIRCADIAN RHYTHM SLEEP DISORDER, DELAYED SLEEP PHASE TYPE: ICD-10-CM

## 2025-04-25 DIAGNOSIS — E03.9 HYPOTHYROIDISM, UNSPECIFIED TYPE: ICD-10-CM

## 2025-04-25 PROCEDURE — G0463 HOSPITAL OUTPT CLINIC VISIT: HCPCS

## 2025-04-25 RX ORDER — ERGOCALCIFEROL 1.25 MG/1
50000 CAPSULE, LIQUID FILLED ORAL
Qty: 15 CAPSULE | Refills: 0 | Status: SHIPPED | OUTPATIENT
Start: 2025-04-25

## 2025-04-25 RX ORDER — TRETINOIN 0.25 MG/G
CREAM TOPICAL NIGHTLY
Qty: 45 G | Refills: 3 | Status: SHIPPED | OUTPATIENT
Start: 2025-04-25

## 2025-04-25 RX ORDER — BACLOFEN 10 MG/1
10 TABLET ORAL AS NEEDED
Status: CANCELLED | OUTPATIENT
Start: 2025-04-25

## 2025-04-25 RX ORDER — KETOROLAC TROMETHAMINE 30 MG/ML
30 INJECTION, SOLUTION INTRAMUSCULAR; INTRAVENOUS
Status: CANCELLED | OUTPATIENT
Start: 2025-04-25
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
## 2025-04-25 NOTE — TELEPHONE ENCOUNTER
Rx Refill Note  Requested Prescriptions     Pending Prescriptions Disp Refills    tretinoin (RETIN-A) 0.025 % cream      baclofen (LIORESAL) 10 MG tablet       Sig: Take 1 tablet by mouth As Needed.    ketorolac (TORADOL) 30 MG/ML injection       Sig: Inject 1 mL into the appropriate muscle as directed by prescriber.    vitamin D (ERGOCALCIFEROL) 1.25 MG (15032 UT) capsule capsule 5 capsule      Sig: Take 1 capsule by mouth Every 7 (Seven) Days.      Last office visit with prescribing clinician: 10/18/2024   Last telemedicine visit with prescribing clinician: 4/18/2025   Next office visit with prescribing clinician:     Sched appt??   {

## 2025-04-26 PROBLEM — L57.8 OTHER SKIN CHANGES DUE TO CHRONIC EXPOSURE TO NONIONIZING RADIATION: Status: ACTIVE | Noted: 2022-04-30

## 2025-04-26 PROBLEM — H69.92 ACUTE DYSFUNCTION OF LEFT EUSTACHIAN TUBE: Status: ACTIVE | Noted: 2025-04-26

## 2025-04-26 PROBLEM — H66.92 LEFT OTITIS MEDIA: Status: ACTIVE | Noted: 2025-04-26

## 2025-04-30 ENCOUNTER — HOSPITAL ENCOUNTER (EMERGENCY)
Facility: HOSPITAL | Age: 45
Discharge: HOME | End: 2025-04-30
Attending: STUDENT IN AN ORGANIZED HEALTH CARE EDUCATION/TRAINING PROGRAM
Payer: OTHER GOVERNMENT

## 2025-04-30 ENCOUNTER — APPOINTMENT (OUTPATIENT)
Dept: GENERAL RADIOLOGY | Facility: HOSPITAL | Age: 45
End: 2025-04-30
Payer: OTHER GOVERNMENT

## 2025-04-30 VITALS
BODY MASS INDEX: 25.76 KG/M2 | TEMPERATURE: 98.4 F | SYSTOLIC BLOOD PRESSURE: 142 MMHG | HEIGHT: 62 IN | DIASTOLIC BLOOD PRESSURE: 88 MMHG | RESPIRATION RATE: 18 BRPM | HEART RATE: 59 BPM | OXYGEN SATURATION: 100 % | WEIGHT: 140 LBS

## 2025-04-30 DIAGNOSIS — R07.9 CHEST PAIN, UNSPECIFIED TYPE: Primary | ICD-10-CM

## 2025-04-30 LAB
ALBUMIN SERPL-MCNC: 4.5 G/DL (ref 3.5–5.2)
ALBUMIN/GLOB SERPL: 1.9 G/DL
ALP SERPL-CCNC: 52 U/L (ref 39–117)
ALT SERPL W P-5'-P-CCNC: 22 U/L (ref 1–33)
ANION GAP SERPL CALCULATED.3IONS-SCNC: 10.9 MMOL/L (ref 5–15)
AST SERPL-CCNC: 20 U/L (ref 1–32)
BASOPHILS # BLD AUTO: 0.05 10*3/MM3 (ref 0–0.2)
BASOPHILS NFR BLD AUTO: 0.8 % (ref 0–1.5)
BILIRUB SERPL-MCNC: 0.2 MG/DL (ref 0–1.2)
BUN SERPL-MCNC: 18 MG/DL (ref 6–20)
BUN/CREAT SERPL: 24 (ref 7–25)
CALCIUM SPEC-SCNC: 9.3 MG/DL (ref 8.6–10.5)
CHLORIDE SERPL-SCNC: 104 MMOL/L (ref 98–107)
CO2 SERPL-SCNC: 24.1 MMOL/L (ref 22–29)
CREAT SERPL-MCNC: 0.75 MG/DL (ref 0.57–1)
D DIMER PPP FEU-MCNC: <0.27 MCGFEU/ML (ref 0–0.5)
DEPRECATED RDW RBC AUTO: 42.3 FL (ref 37–54)
EGFRCR SERPLBLD CKD-EPI 2021: 100.2 ML/MIN/1.73
EOSINOPHIL # BLD AUTO: 0.06 10*3/MM3 (ref 0–0.4)
EOSINOPHIL NFR BLD AUTO: 0.9 % (ref 0.3–6.2)
ERYTHROCYTE [DISTWIDTH] IN BLOOD BY AUTOMATED COUNT: 12.9 % (ref 12.3–15.4)
GLOBULIN UR ELPH-MCNC: 2.4 GM/DL
GLUCOSE SERPL-MCNC: 88 MG/DL (ref 65–99)
HCT VFR BLD AUTO: 40.6 % (ref 34–46.6)
HGB BLD-MCNC: 13.6 G/DL (ref 12–15.9)
HOLD SPECIMEN: NORMAL
HOLD SPECIMEN: NORMAL
IMM GRANULOCYTES # BLD AUTO: 0.04 10*3/MM3 (ref 0–0.05)
IMM GRANULOCYTES NFR BLD AUTO: 0.6 % (ref 0–0.5)
LIPASE SERPL-CCNC: 32 U/L (ref 13–60)
LYMPHOCYTES # BLD AUTO: 1.63 10*3/MM3 (ref 0.7–3.1)
LYMPHOCYTES NFR BLD AUTO: 25.5 % (ref 19.6–45.3)
MCH RBC QN AUTO: 30 PG (ref 26.6–33)
MCHC RBC AUTO-ENTMCNC: 33.5 G/DL (ref 31.5–35.7)
MCV RBC AUTO: 89.6 FL (ref 79–97)
MONOCYTES # BLD AUTO: 0.63 10*3/MM3 (ref 0.1–0.9)
MONOCYTES NFR BLD AUTO: 9.9 % (ref 5–12)
NEUTROPHILS NFR BLD AUTO: 3.98 10*3/MM3 (ref 1.7–7)
NEUTROPHILS NFR BLD AUTO: 62.3 % (ref 42.7–76)
NRBC BLD AUTO-RTO: 0 /100 WBC (ref 0–0.2)
PLATELET # BLD AUTO: 281 10*3/MM3 (ref 140–450)
PMV BLD AUTO: 10.5 FL (ref 6–12)
POTASSIUM SERPL-SCNC: 3.9 MMOL/L (ref 3.5–5.2)
PROT SERPL-MCNC: 6.9 G/DL (ref 6–8.5)
RBC # BLD AUTO: 4.53 10*6/MM3 (ref 3.77–5.28)
SODIUM SERPL-SCNC: 139 MMOL/L (ref 136–145)
TROPONIN T SERPL HS-MCNC: <6 NG/L
WBC NRBC COR # BLD AUTO: 6.39 10*3/MM3 (ref 3.4–10.8)
WHOLE BLOOD HOLD COAG: NORMAL
WHOLE BLOOD HOLD SPECIMEN: NORMAL

## 2025-04-30 PROCEDURE — 93005 ELECTROCARDIOGRAM TRACING: CPT | Performed by: STUDENT IN AN ORGANIZED HEALTH CARE EDUCATION/TRAINING PROGRAM

## 2025-04-30 PROCEDURE — 93010 ELECTROCARDIOGRAM REPORT: CPT | Performed by: INTERNAL MEDICINE

## 2025-04-30 PROCEDURE — 83690 ASSAY OF LIPASE: CPT | Performed by: STUDENT IN AN ORGANIZED HEALTH CARE EDUCATION/TRAINING PROGRAM

## 2025-04-30 PROCEDURE — 85379 FIBRIN DEGRADATION QUANT: CPT | Performed by: STUDENT IN AN ORGANIZED HEALTH CARE EDUCATION/TRAINING PROGRAM

## 2025-04-30 PROCEDURE — 99284 EMERGENCY DEPT VISIT MOD MDM: CPT | Performed by: STUDENT IN AN ORGANIZED HEALTH CARE EDUCATION/TRAINING PROGRAM

## 2025-04-30 PROCEDURE — 80053 COMPREHEN METABOLIC PANEL: CPT | Performed by: STUDENT IN AN ORGANIZED HEALTH CARE EDUCATION/TRAINING PROGRAM

## 2025-04-30 PROCEDURE — 84484 ASSAY OF TROPONIN QUANT: CPT | Performed by: STUDENT IN AN ORGANIZED HEALTH CARE EDUCATION/TRAINING PROGRAM

## 2025-04-30 PROCEDURE — 71045 X-RAY EXAM CHEST 1 VIEW: CPT

## 2025-04-30 PROCEDURE — 85025 COMPLETE CBC W/AUTO DIFF WBC: CPT | Performed by: STUDENT IN AN ORGANIZED HEALTH CARE EDUCATION/TRAINING PROGRAM

## 2025-04-30 RX ORDER — OMEPRAZOLE 20 MG/1
20 CAPSULE, DELAYED RELEASE ORAL 2 TIMES DAILY
Qty: 60 CAPSULE | Refills: 0 | Status: SHIPPED | OUTPATIENT
Start: 2025-04-30

## 2025-04-30 RX ORDER — SODIUM CHLORIDE 0.9 % (FLUSH) 0.9 %
10 SYRINGE (ML) INJECTION AS NEEDED
Status: DISCONTINUED | OUTPATIENT
Start: 2025-04-30 | End: 2025-04-30 | Stop reason: HOSPADM

## 2025-04-30 RX ORDER — ACETAMINOPHEN 500 MG
1000 TABLET ORAL EVERY 6 HOURS PRN
Qty: 30 TABLET | Refills: 0 | Status: SHIPPED | OUTPATIENT
Start: 2025-04-30 | End: 2025-05-07

## 2025-04-30 NOTE — ED PROVIDER NOTES
Subjective   History of Present Illness    Review of Systems    Past Medical History:   Diagnosis Date    Anxiety     Attempted suicide     Depression     Hypertension     Migraines        Allergies   Allergen Reactions    Benztropine Other (See Comments) and Unknown - Low Severity     Cogentin    Dexamethasone Unknown - Low Severity and Other (See Comments)     Decadron    Adhesive Tape Rash    Latex Hives and Rash       Past Surgical History:   Procedure Laterality Date    AUGMENTATION MAMMAPLASTY Bilateral     2006    ELBOW PROCEDURE Right 2022    elbow repair    FOOT SURGERY Right 12/2022    HEMORRHOIDECTOMY  2006    HYSTERECTOMY  2021    KNEE SURGERY Left 2024    LAPAROSCOPIC CHOLECYSTECTOMY  2012       Family History   Problem Relation Age of Onset    Obesity Mother     Hypertension Mother     Heart disease Mother     Sleep apnea Mother     Diabetes Father     Hypertension Father     Stroke Father     Heart attack Father     Obesity Sister     Sleep apnea Sister     Breast cancer Maternal Grandmother        Social History     Socioeconomic History    Marital status: Single   Tobacco Use    Smoking status: Never     Passive exposure: Never    Smokeless tobacco: Never   Vaping Use    Vaping status: Never Used   Substance and Sexual Activity    Alcohol use: Never    Drug use: Never    Sexual activity: Yes     Partners: Male     Birth control/protection: Hysterectomy           Objective   Physical Exam    Procedures       EKG interpretation  Interpreted by: Michael Brown MD   Date: 04/30/2025  Time:  1626  Rate: 58 BPM  Rhythm: sinus bradycardia  Impression:  TWI III, V2. TW flattening V4-V6. -STEMI    ED Course                  HEART Score: 2                                      Medical Decision Making  Problems Addressed:  Chest pain, unspecified type: complicated acute illness or injury    Amount and/or Complexity of Data Reviewed  Labs: ordered.  Radiology: ordered.  ECG/medicine tests:  ordered.    Risk  OTC drugs.  Prescription drug management.    45 female hx cholecystectomy presents to the ER chief complaint of 3 days of intermittent right-sided pressure-like chest pain, without associated symptoms, without palliating or exacerbating factors, occurring in the context of rest denies fever chills cough syncope nausea vomiting diaphoresis.  ROS otherwise negative vitals are within normal limits on exam patient well-appearing clear to auscultation nontender abdomen no flank tenderness.    Considered atypical ACS, PE otherwise low risk, as well as more likely to be acid reflux.  Evaluate with abdominal cardiac labs EKG chest x-ray D-dimer.    D-dimer is negative troponin is negative EKG is normal sinus rhythm with nonspecific T wave inversions, heart score is 2 low risk for cardiac origin of chest pain.  Will recommend PCP follow-up, cardiology follow-up gastroenterology follow-up omeprazole p.o. Tylenol p.o.    Hst pt  Dsp home    Final diagnoses:   Chest pain, unspecified type       ED Disposition  ED Disposition       ED Disposition   Discharge    Condition   Stable    Comment   --               Ania Montes, APRN  [address]  [address]  [facility] [id number] [phone]    In 2 days      Oswaldo Hatch III, MD  [address]  [facility] [id number] [phone]          Anjel Dowd MD  [address] [facility]  [facility] [address]  [phone]               Medication List        New Prescriptions      acetaminophen 500 MG tablet  Commonly known as: TYLENOL  Take 2 tablets by mouth Every 6 (Six) Hours As Needed for Mild Pain for up to 7 days.     omeprazole 20 MG capsule  Commonly known as: priLOSEC  Take 1 capsule by mouth 2 (Two) Times a Day.               Where to Get Your Medications        These medications were sent to [id number] - [place] - [address] - [phone]  - [phone] FX  [address] [place] [address]       Phone: [phone]   acetaminophen 500 MG tablet  omeprazole 20 MG capsule            Trevor Crespo MD  04/30/25 9978

## 2025-05-01 LAB
QT INTERVAL: 425 MS
QTC INTERVAL: 415 MS

## 2025-05-08 LAB
QT INTERVAL: 421 MS
QTC INTERVAL: 414 MS

## 2025-05-13 LAB
QT INTERVAL: 421 MS
QTC INTERVAL: 414 MS

## 2025-05-16 ENCOUNTER — OFFICE VISIT (OUTPATIENT)
Dept: BARIATRICS/WEIGHT MGMT | Facility: CLINIC | Age: 45
End: 2025-05-16
Payer: OTHER GOVERNMENT

## 2025-05-16 VITALS
BODY MASS INDEX: 24.11 KG/M2 | DIASTOLIC BLOOD PRESSURE: 77 MMHG | HEIGHT: 66 IN | TEMPERATURE: 97.8 F | HEART RATE: 71 BPM | WEIGHT: 150 LBS | SYSTOLIC BLOOD PRESSURE: 121 MMHG

## 2025-05-16 DIAGNOSIS — E66.3 OVERWEIGHT (BMI 25.0-29.9): Primary | ICD-10-CM

## 2025-05-16 DIAGNOSIS — I10 ESSENTIAL HYPERTENSION: ICD-10-CM

## 2025-05-16 DIAGNOSIS — E55.9 VITAMIN D DEFICIENCY: ICD-10-CM

## 2025-05-16 DIAGNOSIS — N39.3 URINARY, INCONTINENCE, STRESS FEMALE: ICD-10-CM

## 2025-05-16 NOTE — PROGRESS NOTES
[facility] BARIATRIC SURGERY  [address]  [facility] [address] [phone]  [address]  [facility] [address] [phone]  Dept: [phone]  5/16/2025      Leslie Art.  [id number]  [id number]  1980  female      Chief Complaint of weight gain; unable to maintain weight loss    History of Present Illness:   Leslie is a 45 y.o. female who presents today for evaluation, education and consultation regarding weight loss with the supervision of a medical specialist and registered dietitian.     Diet History:Leslie has been overweight for at least *** years, has been 35 pounds or more overweight for at least *** years, has been 100 pounds or more overweight for *** or more years and started dieting at age ***.  The most weight Leslie lost was *** pounds on *** and maintained the weight loss for ***. Leslie describes her eating habits as ***. Leslie Art has tried {[employer] DIet HIstory AMS:11897} among others with success of losing up to *** pounds, but in each instance regained the weight.     See dietician documentation for complete history.    Bariatric Surgery Evaluation: The patient is being seen for an initial visit for weight evaluation and education.     Bariatric Co-morbidities:  {AMB BAR Co-Morbidities:96749}    Patient Active Problem List   Diagnosis    Allergic rhinitis    Arthralgia of multiple joints    Arthralgia of right ankle    Arthralgia of right elbow    Cervical dysplasia    Congenital cavus deformity of foot    Congenital pes cavus    Depressive disorder    Disorder of pharynx    Dysphagia    Dysmenorrhea, unspecified    Elevated blood-pressure reading without diagnosis of hypertension    Essential hypertension    Fatigue    Herpes simplex    Hypercholesterolemia    Hyperlipidemia    Hypothyroidism    Increased frequency of urination    Inflammation of peripheral nerve of right foot    Insomnia     Lateral epicondylitis of right elbow    Migraine headache    Mixed stress and urge urinary incontinence    Plantar fascial fibromatosis    Plantar fasciitis of left foot    Plantar fasciitis of right foot    Rheumatoid arthritis    Rhytidosis facialis    Rosacea    Urinary urgency    Degeneration of lumbar intervertebral disc    Inflammation of sacroiliac joint    Lumbar radiculopathy    Vitamin D deficiency    Li's esophagus    Urinary, incontinence, stress female    Fibromyalgia    Overweight (BMI 25.0-29.9)    Sciatica    Adjustment disorder    Circadian rhythm sleep disorder, delayed sleep phase type    Other skin changes due to chronic exposure to nonionizing radiation    Left otitis media    Acute dysfunction of left eustachian tube       Past Medical History:   Diagnosis Date    Anxiety     Attempted suicide     Depression     Hypertension     Migraines        Past Surgical History:   Procedure Laterality Date    AUGMENTATION MAMMAPLASTY Bilateral     2006    ELBOW PROCEDURE Right 2022    elbow repair    FOOT SURGERY Right 12/2022    HEMORRHOIDECTOMY  2006    HYSTERECTOMY  2021    KNEE SURGERY Left 2024    LAPAROSCOPIC CHOLECYSTECTOMY  2012       Allergies   Allergen Reactions    Benztropine Other (See Comments) and Unknown - Low Severity     Cogentin    Dexamethasone Unknown - Low Severity and Other (See Comments)     Decadron    Adhesive Tape Rash    Latex Hives and Rash         Current Outpatient Medications:     baclofen (LIORESAL) 10 MG tablet, Take 1 tablet by mouth 3 (Three) Times a Day., Disp: , Rfl:     estradiol (ESTRACE) 0.1 MG/GM vaginal cream, INSERT 2 GRAMS INTO THE VAGINA DAILY FOR 2 WEEKS; THEN TWICE WEEKLY, Disp: 43 g, Rfl: 0    omeprazole (priLOSEC) 20 MG capsule, Take 1 capsule by mouth 2 (Two) Times a Day., Disp: 60 capsule, Rfl: 0    propranolol (INDERAL) 10 MG tablet, TAKE 1 TO 2 TABLETS BY MOUTH ONCE DAILY AS NEEDED FOR ANXIETY, Disp: , Rfl:     SEMAGLUTIDE, 1 MG/DOSE, SC, , Disp:  , Rfl:     ALPRAZolam (Xanax) 0.5 MG tablet, Take 1 tablet by mouth 2 (Two) Times a Day As Needed for Anxiety., Disp: 10 tablet, Rfl: 1    Atogepant (Qulipta) 60 MG tablet, Take 1 tablet by mouth Daily., Disp: , Rfl:     cyanocobalamin (VITAMIN B-12N) 50 MCG tablet, , Disp: , Rfl:     estradiol (Estrace) 1 MG tablet, Take 1 tablet by mouth Daily., Disp: 60 tablet, Rfl: 1    ketorolac (TORADOL) 30 MG/ML injection, Inject 1 mL into the appropriate muscle as directed by prescriber., Disp: , Rfl:     lamoTRIgine (LaMICtal) 25 MG tablet, TAKE 1 TABLET BY MOUTH TWICE DAILY FOR ONE WEEK. IF NO RASH, TRANSITION TO 2 ONCE DAILY, Disp: , Rfl:     lidocaine (XYLOCAINE) 5 % ointment, APPLY TO AFFECTED AREA(S) BY TOPICAL ROUTE 1-4 TIMES DAILY AS NEEDED, Disp: , Rfl:     Qulipta 60 MG tablet, Take 1 tablet by mouth Daily., Disp: , Rfl:     SUMAtriptan (IMITREX) 100 MG tablet, TAKE 1 TABLET BY MOUTH AT ONSET OF HEADACHE AS NEEDED FOR MIGRAINE. MAY REPEAT DOSE 1 TIME IN 2 HOURS IF HEADACHE NOT RELIEVED, Disp: 30 tablet, Rfl: 0    tretinoin (RETIN-A) 0.025 % cream, Apply  topically to the appropriate area as directed Every Night., Disp: 45 g, Rfl: 3    triamcinolone (KENALOG) 0.1 % cream, APPLY THIN LAYER TOPICALLY TO THE AFFECTED AREA TWICE DAILY AT ONSET OF FLARES FOR UP TO 2 WEEKS FOR ITCHING. AVOID USE ON FACE OR GROIN, Disp: , Rfl:     Valtrex 1 g tablet, Take 1 tablet by mouth Daily., Disp: 90 tablet, Rfl: 0    vitamin D (ERGOCALCIFEROL) 1.25 MG (71302 UT) capsule capsule, Take 1 capsule by mouth Every 7 (Seven) Days., Disp: 15 capsule, Rfl: 0    Social History     Socioeconomic History    Marital status: Single   Tobacco Use    Smoking status: Never     Passive exposure: Never    Smokeless tobacco: Never   Vaping Use    Vaping status: Never Used   Substance and Sexual Activity    Alcohol use: Never    Drug use: Never    Sexual activity: Yes     Partners: Male     Birth control/protection: Hysterectomy       Family History  "  Problem Relation Age of Onset    Obesity Mother     Hypertension Mother     Heart disease Mother     Sleep apnea Mother     Diabetes Father     Hypertension Father     Stroke Father     Heart attack Father     Obesity Sister     Sleep apnea Sister     Breast cancer Maternal Grandmother          Review of Systems:  Review of Systems    Physical Exam:  Vital Signs:  Weight: 68 kg (150 lb)   Body mass index is 24.11 kg/m².  Temp: 97.8 °F (36.6 °C)   Heart Rate: 71   BP: 121/77     Physical Exam       Assessment:         Leslie Art is a 45 y.o. year old female with Body mass index is 24.11 kg/m². . Weight: 68 kg (150 lb), Body mass index is 24.11 kg/m². and weight related problems including {AMB BAR Co-Morbidities:06166}.    {Data reviewed (Optional):56273::\"Radiologic studies ***\",\"Cardiology studies ***\",\"Consultant notes ***\",\"***\"} and  {AMSNEWORDERS:11389::\"EKG\",\"CXR\",\"psychology clearance\",\"CBC\",\"CMP\",\"Hba1c\",\"TSH\",\"EGD with biopsy\"}were ordered at this time. These will be drawn and patient will be notified with results.     Leslie Art was screened for sleep apnea in our office today and based on their results ***. The risks, as they relate to chronic hypercapnia r/t untreated ONI were discussed with the patient and She verbalized understanding.     The patient was advised to start a high protein, low fat and low carbohydrate diet  start routine exercise including but not limited to 150 minutes per week. The patient was given individualized information by our dietician along with handouts.     I think she {IS/ IS NOT:23140} a good candidate for regular follow up and treatment as a participant in our medical weight loss program.    Encounter Diagnoses   Name Primary?    Overweight (BMI 25.0-29.9) Yes    Essential hypertension     Vitamin D deficiency     Urinary, incontinence, stress female        Plan:    Diagnoses and all orders for this visit:    1. Overweight (BMI 25.0-29.9) (Primary)    2. " "Essential hypertension  Overview:  Lifestyle mods, weight management, exercise      3. Vitamin D deficiency    4. Urinary, incontinence, stress female         Patient did receive group education by our bariatric dieitian today and will undergo individual evaluation with assistance in developing an individualized dietary plan in two weeks time. The patient and I discussed her daily routine including diet recall, work schedule, home life with a focus on opportunities for lifestyle changes related to physical activity, meal planning, and healthy eating. Education was provided regarding logging dietary intake either by manually recording intake or using a dietary tracking gissell with a focus on macronutrient intake including protein, fat, and carbohydrates resulting in a reduced calorie diet. Discussed the importance of focusing on nutrient dense foods, minimally processed foods high in protein and fiber content to support earlier and prolonged fullness and satiety as well as the importance of limiting and moderating processed and high glycemic index foods and how to couple small amounts of these with fiber, protein and healthy fat to stabilize blood sugar. Discussed the importance of sleep hygiene and exercise as they relate to weight maintenance, cardiovascular health as well as stress mediation and reducing inflammation. Generally discussed weight loss program options regarding different diet plans.     Patient was advised to focus on tracking dietary intake manually or via tracking gissell for the next two weeks until individual follow up with the dietitian. At which time, She will decide which dietary plan She would like to follow.     Goals for changes prior to next appointment include keeping a diet log which She will bring to female next appointment. Will focus on ****    Total time spent during this encounter today was *** minutes and includes {2021 Time Documentation (Optional):[id number]::\"preparing for the " "visit\",\"reviewing tests\",\"performing a medically appropriate examination and/or evaluations\",\"counseling and educating the patient/family/caregiver\",\"ordering medications, tests, or procedures\",\"documenting information in the medical record\",\"independently interpreting results and communicating that information with the patient/family/caregiver\",\"***\"}  Agnes Nathan, APRN  5/16/2025                                               "

## 2025-05-16 NOTE — PROGRESS NOTES
[facility]ATR[facility] BARIATRIC SURGERY  [address]  [facility] [id number] [phone]  [address]  [facility] [id number] [phone]  Dept: [phone]  5/16/2025      Leslie Art.  [id number]  [id number]  1980  female      Chief Complaint   Patient presents with    Follow-up     [facility] Bariatric Medicine:   Leslie Art presents today for follow up today for provider supervised medical weight loss who has been taking compound semaglutide, most recently at the 1mg dose weekly    HPI:   Today's weight is 68 kg (150 lb) pounds, today's BMI is Body mass index is 24.11 kg/m²., she has a  loss of 4 pounds since the last visit.     Leslie Art denies nausea, vomiting, reflux, abdominal pain, dysphagia and reports that she is doing well with the medication. She reports mild constipation but also reports that this has not changed since taking the medication and mild intermittent heartburn.      Diet and Exercise: Diet history reviewed and discussed with the patient. Weight loss/gains to date discussed with the patient. The patient states they are eating  grams of protein per day. She reports eating 4 meals per day, a typical portion size of 1/2 cup, eating 1 snacks per day, drinking 5-6 or more 8-oz. glasses of water per day, no carbonated beverage consumption and exercising regularly- cardio and strength training 2-3 days per week    Supplements: none.     Review of Systems   Constitutional:  Positive for appetite change. Negative for fatigue and unexpected weight change.   HENT: Negative.     Eyes: Negative.    Respiratory: Negative.     Cardiovascular: Negative.  Negative for leg swelling.   Gastrointestinal:  Negative for abdominal distention, abdominal pain, constipation, diarrhea, nausea and vomiting.   Genitourinary:  Negative for difficulty urinating, frequency and urgency.   Musculoskeletal:  Negative for  back pain.   Skin: Negative.    Psychiatric/Behavioral: Negative.     All other systems reviewed and are negative.      Patient Active Problem List   Diagnosis    Allergic rhinitis    Arthralgia of multiple joints    Arthralgia of right ankle    Arthralgia of right elbow    Cervical dysplasia    Congenital cavus deformity of foot    Congenital pes cavus    Depressive disorder    Disorder of pharynx    Dysphagia    Dysmenorrhea, unspecified    Elevated blood-pressure reading without diagnosis of hypertension    Essential hypertension    Fatigue    Herpes simplex    Hypercholesterolemia    Hyperlipidemia    Hypothyroidism    Increased frequency of urination    Inflammation of peripheral nerve of right foot    Insomnia    Lateral epicondylitis of right elbow    Migraine headache    Mixed stress and urge urinary incontinence    Plantar fascial fibromatosis    Plantar fasciitis of left foot    Plantar fasciitis of right foot    Rheumatoid arthritis    Rhytidosis facialis    Rosacea    Urinary urgency    Degeneration of lumbar intervertebral disc    Inflammation of sacroiliac joint    Lumbar radiculopathy    Vitamin D deficiency    Li's esophagus    Urinary, incontinence, stress female    Fibromyalgia    Overweight (BMI 25.0-29.9)    Sciatica    Adjustment disorder    Circadian rhythm sleep disorder, delayed sleep phase type    Other skin changes due to chronic exposure to nonionizing radiation    Left otitis media    Acute dysfunction of left eustachian tube       Past Medical History:   Diagnosis Date    Anxiety     Attempted suicide     Depression     Hypertension     Migraines        The following portions of the patient's history were reviewed and updated as appropriate: allergies, current medications, past family history, past medical history, past social history, past surgical history, and problem list.    Vitals:    05/16/25 0907   BP: 121/77   Pulse: 71   Temp: 97.8 °F (36.6 °C)       Physical Exam  Vitals  and nursing note reviewed.   Constitutional:       Appearance: She is well-developed.   Neck:      Thyroid: No thyromegaly.   Cardiovascular:      Rate and Rhythm: Normal rate.   Pulmonary:      Effort: Pulmonary effort is normal. No respiratory distress.   Abdominal:      Palpations: Abdomen is soft.   Musculoskeletal:         General: No tenderness.   Skin:     General: Skin is warm and dry.   Neurological:      Mental Status: She is alert.   Psychiatric:         Behavior: Behavior normal.         Assessment:   The patient is doing well.   (E66.3) Overweight (BMI 25.0-29.9)    (I10) Essential hypertension    (E55.9) Vitamin D deficiency    (N39.3) Urinary, incontinence, stress female     Plan:   Patient is aware that I am no longer prescribing the compound medication. She will discuss wegovy for cardiac risk reduction with her cardiologist as she reports that she has a history of stroke.     Encouraged patient to be sure to get plenty of lean protein per day through small frequent meals all with a protein source.   Activity restrictions: none.   Recommended patient be sure to get at least 70 grams of protein per day by eating small, frequent meals all with high lean protein choices. Be sure to limit/cut back on daily carbohydrate intake. Discussed with the patient the recommended amount of water per day to intake- half of body weight in ounces. Reviewed vitamin requirements. Be sure to do routine exercise, 150 minutes per week minimum, including both cardio and strength training.     Instructions / Recommendations: dietary counseling recommended, recommended a daily protein intake of  grams, vitamin supplement(s) recommended, recommended exercising at least 150 minutes per week, behavior modifications recommended and instructed to call the office for concerns, questions, or problems.     The patient was instructed to follow up in 3 months .    Total time spent during this encounter today was 25 minutes

## 2025-07-25 DIAGNOSIS — Z76.0 ENCOUNTER FOR MEDICATION REFILL: ICD-10-CM

## 2025-07-28 RX ORDER — VALACYCLOVIR HCL 1000 MG
1000 TABLET ORAL DAILY
Qty: 90 TABLET | Refills: 0 | OUTPATIENT
Start: 2025-07-28

## 2025-07-30 RX ORDER — ESTRADIOL 0.1 MG/G
CREAM VAGINAL
Qty: 43 G | Refills: 0 | Status: SHIPPED | OUTPATIENT
Start: 2025-07-30

## 2025-08-18 RX ORDER — ESTRADIOL 0.1 MG/G
CREAM VAGINAL
Qty: 43 G | Refills: 0 | Status: SHIPPED | OUTPATIENT
Start: 2025-08-18